# Patient Record
Sex: MALE | Race: WHITE | Employment: FULL TIME | URBAN - METROPOLITAN AREA
[De-identification: names, ages, dates, MRNs, and addresses within clinical notes are randomized per-mention and may not be internally consistent; named-entity substitution may affect disease eponyms.]

---

## 2023-01-27 NOTE — H&P
Patient:   Eduardo Pastrana  YOB: 1965   Date:                        01/27/2023 11:00 AM   Visit Type:                  Consult  Provider:   Leena Thomson MD  Referring Provider:  Susana Edwards MD [facility]  Primary Care Provider: Susana Miranda    This 61year old  patient was referred by Susana Edwards MD.  This 61year old male presents for dilated bile duct and elevated LFTs. History of Present Illness:  1.  dilated bile duct   2.  elevated LFTs   Mr. Alexander De Souza is a 61year old male patient, referred by Dr. Anayeli Messina, who is seen in work in today for abnormal LFTs and a dilated bile duct. I have reviewed the referring provider's note. Patient is unaccompanied for today' visit. He experienced indigestion and worsened acid reflux, which has since improved on Prilosec. He also experiences discomfort under the right ribcage. He also reports dark urine. He wishes to proceed with an ERCP. His last colonoscopy was about 9 years ago. He denies jaundice as well as pulmonary and cardiac concerns. Past Medical/Surgical History:   (Detailed)  Disease/disorder Onset Date Management Date Comments     sinus surgery  MAB 01/27/2023 -   Sleep apnea         Family History:  (Detailed)  Patient reports there is no relevant family history. Social History:  (Detailed)  Tobacco use reviewed. Preferred language is [place]. Smoking status: Current every day smoker. SMOKING STATUS  Type Smoking Status Usage Per Day Years Used Total Pack Years    Current every day smoker        ALCOHOL  There is a history of alcohol use. CAFFEINE  The patient uses caffeine.   COMMENTS  Patient denies iv drug use, blood transfusions, tattoos, piercings, hep a/b vaccination, and flu vaccination    Current Medications:  Medication Generic Name Directions   ondansetron 4 mg disintegrating tablet ondansetron take 1 Tablet by oral route  every 6 hours and place on top of the tongue where it will dissolve, then swallow   PATIENT UNSURE OF DRUG NAME PATIENT UNSURE OF DRUG NAME acid reflux medicines, 1 twice a day and 1 three times a day     Allergies:  Ingredient Reaction (Severity) Medication Name Comment   NO KNOWN ALLERGIES      Reviewed, no changes. Review of Systems:  System Neg/Pos Details   Constitutional Positive Weight loss. ENMT Negative Hearing deficit. Eyes Negative Vision changes. Respiratory Positive Cough. Respiratory Negative Dyspnea. Cardio Negative Chest pain and Irregular heartbeat/palpitations. GI Positive Heartburn, Nausea, Reflux.  Negative Dysuria and Urinary frequency. Endocrine Negative Cold intolerance and Heat intolerance. Neuro Negative Confusion/disorientation, Dizziness, Headache and Seizures. Integumentary Positive Itching skin. Hema/Lymph Negative Easy bleeding. Vital Signs:  BP mm/Hg Pulse Resp Pulse Ox Temp F Ht (Total in.) Weight (lbs.) Weight (oz.) BMI   99/66 75 16   72.00 191.00  25.90     Physical Exam:  Weight has remained stable. Overweight   Patient is healthy-appearing, in no acute distress. Lungs- clear to auscultation and percussion. Heart- regular rate and rhythm without murmur, gallop or rub. Further exam deferred at this time. Assessment/Plan:  # Detail Type Description    1. Assessment Abnormal LFTs (R79.89). 2. Assessment Abnormal ultrasound (R93.89). Plan Orders Further evaluations ordered today include ERCP to be performed, Next Lab Date is on 02/01/2023.         3. Assessment Colon cancer screening (Z12.11). Provider Plan Patient may have CBD stones. Possible pancreatic CA ampullary lesion. Not likely SOD. Reviewed options of EUS vs MRI vs CT, but will proceed with ERCP for diagnosis and therapeutics. Not overtly jaundice and bilirubin is not very high so hopefully so it is not malignancy.             Counseling / Educational Factors:  Items reviewed / discussed during today's visit: testing was discussed in detail; old records were reviewed; indications and risks of the procedure were discussed; symptomatic care was discussed; advised to continue current treatment; patient instructed to call for results of ERCP. Patient expressed understanding of instructions. The patient was checked out at 11:51 AM by Renu Rasmussen. I personally performed the services described in this documentation. Lil[facility] Coyne ([facility] assisted in my presence with documentation, which I have reviewed and validated. Provider:    Trisha Pike  01/27/2023 12:01 PM   Document generated by: Dawn Stephens MD 01/27/2023    CC Providers:  Cheryl RESENDEZ [facility] (DP/SNF)  [address]  [facility] [place] [facility] [place],  [address] [facility]-    ___________________________________________________________________________________________________________________________    Electronically signed by Dawn Stephens MD on 01/27/2023 12:01 PM

## 2023-01-30 ENCOUNTER — PREP FOR PROCEDURE (OUTPATIENT)
Dept: ADMINISTRATIVE | Age: 58
End: 2023-01-30

## 2023-01-30 RX ORDER — ONDANSETRON 4 MG/1
4 TABLET, ORALLY DISINTEGRATING ORAL 3 TIMES DAILY PRN
COMMUNITY
Start: 2023-01-26

## 2023-01-30 RX ORDER — PANTOPRAZOLE SODIUM 20 MG/1
20 TABLET, DELAYED RELEASE ORAL 2 TIMES DAILY
COMMUNITY
Start: 2023-01-19

## 2023-01-30 RX ORDER — SUCRALFATE 1 G/1
1 TABLET ORAL 3 TIMES DAILY
COMMUNITY
Start: 2023-01-19

## 2023-01-30 RX ORDER — HYDROCORTISONE ACETATE 25 MG/1
25 SUPPOSITORY RECTAL 2 TIMES DAILY PRN
COMMUNITY
Start: 2022-10-14

## 2023-01-30 NOTE — PERIOP NOTE
Patient verified name, , and procedure. Type: 1a; abbreviated assessment per anesthesia guidelines  Labs per surgeon: none ordered  Labs per anesthesia: not indicated      Instructed pt that they will be notified by the Gi Lab for time of arrival. If any questions please call the GI lab at [phone]. Follow diet and prep instructions per office. May have clear liquids until 4 hours prior to time of arrival.    [place] Roxo or shower the night before and the am of surgery with antibacterial soap. No lotions, oils, powders, cologne on skin. No make up, eye make up or jewelry. Wear loose fitting comfortable, clean clothing. Must have adult present in building the entire time . Medications for the day of procedure zofran if needed, pantoprazole, carafate. The following discharge instructions reviewed with patient: medication given during procedure may cause drowsiness for several hours, therefore, do not drive or operate machinery for remainder of the day, no alcohol on the day of your procedure, resume regular diet and activity unless otherwise directed, for mild sore throat you may use Cepacol throat lozenges or warm salt water gargles as needed, call your physician for any problems or questions. Patient verbalizes understanding.

## 2023-01-31 ENCOUNTER — ANESTHESIA EVENT (OUTPATIENT)
Dept: ENDOSCOPY | Age: 58
End: 2023-01-31
Payer: COMMERCIAL

## 2023-01-31 RX ORDER — SODIUM CHLORIDE 0.9 % (FLUSH) 0.9 %
5-40 SYRINGE (ML) INJECTION EVERY 12 HOURS SCHEDULED
Status: CANCELLED | OUTPATIENT
Start: 2023-01-31

## 2023-01-31 RX ORDER — SODIUM CHLORIDE 0.9 % (FLUSH) 0.9 %
5-40 SYRINGE (ML) INJECTION PRN
Status: CANCELLED | OUTPATIENT
Start: 2023-01-31

## 2023-01-31 RX ORDER — SODIUM CHLORIDE 9 MG/ML
INJECTION, SOLUTION INTRAVENOUS PRN
Status: CANCELLED | OUTPATIENT
Start: 2023-01-31

## 2023-01-31 NOTE — PROGRESS NOTES
Spoke with patient. He was informed to arrive at 1225 for his 1355 procedure. Patient advised of our  policy and to register prior to coming to the floor. Patient verbalized understanding.

## 2023-02-01 ENCOUNTER — HOSPITAL ENCOUNTER (OUTPATIENT)
Age: 58
Setting detail: OUTPATIENT SURGERY
Discharge: HOME | End: 2023-02-01
Attending: INTERNAL MEDICINE | Admitting: INTERNAL MEDICINE
Payer: COMMERCIAL

## 2023-02-01 ENCOUNTER — APPOINTMENT (OUTPATIENT)
Dept: GENERAL RADIOLOGY | Age: 58
End: 2023-02-01
Attending: INTERNAL MEDICINE
Payer: COMMERCIAL

## 2023-02-01 ENCOUNTER — ANESTHESIA (OUTPATIENT)
Dept: ENDOSCOPY | Age: 58
End: 2023-02-01
Payer: COMMERCIAL

## 2023-02-01 VITALS
HEART RATE: 79 BPM | TEMPERATURE: 97.7 F | OXYGEN SATURATION: 98 % | DIASTOLIC BLOOD PRESSURE: 76 MMHG | SYSTOLIC BLOOD PRESSURE: 120 MMHG | RESPIRATION RATE: 16 BRPM | HEIGHT: 72 IN | BODY MASS INDEX: 25.47 KG/M2 | WEIGHT: 188 LBS

## 2023-02-01 PROCEDURE — 74330 X-RAY BILE/PANC ENDOSCOPY: CPT

## 2023-02-01 PROCEDURE — 3700000001 HC ADD 15 MINUTES (ANESTHESIA): Performed by: INTERNAL MEDICINE

## 2023-02-01 PROCEDURE — 2500000003 HC RX 250 WO HCPCS: Performed by: NURSE ANESTHETIST, CERTIFIED REGISTERED

## 2023-02-01 PROCEDURE — 7100000010 HC PHASE II RECOVERY - FIRST 15 MIN: Performed by: INTERNAL MEDICINE

## 2023-02-01 PROCEDURE — 3609014900 HC ERCP W/SPHINCTEROTOMY &/OR PAPILLOTOMY: Performed by: INTERNAL MEDICINE

## 2023-02-01 PROCEDURE — 6360000002 HC RX W HCPCS: Performed by: NURSE ANESTHETIST, CERTIFIED REGISTERED

## 2023-02-01 PROCEDURE — 7100000000 HC PACU RECOVERY - FIRST 15 MIN: Performed by: INTERNAL MEDICINE

## 2023-02-01 PROCEDURE — 6360000004 HC RX CONTRAST MEDICATION: Performed by: INTERNAL MEDICINE

## 2023-02-01 PROCEDURE — 7100000001 HC PACU RECOVERY - ADDTL 15 MIN: Performed by: INTERNAL MEDICINE

## 2023-02-01 PROCEDURE — 2720000010 HC SURG SUPPLY STERILE: Performed by: INTERNAL MEDICINE

## 2023-02-01 PROCEDURE — C1769 GUIDE WIRE: HCPCS | Performed by: INTERNAL MEDICINE

## 2023-02-01 PROCEDURE — 2709999900 HC NON-CHARGEABLE SUPPLY: Performed by: INTERNAL MEDICINE

## 2023-02-01 PROCEDURE — 2580000003 HC RX 258: Performed by: ANESTHESIOLOGY

## 2023-02-01 PROCEDURE — 3700000000 HC ANESTHESIA ATTENDED CARE: Performed by: INTERNAL MEDICINE

## 2023-02-01 PROCEDURE — C2625 STENT, NON-COR, TEM W/DEL SY: HCPCS | Performed by: INTERNAL MEDICINE

## 2023-02-01 DEVICE — BILIARY STENT WITH NAVIFLEXTM RX DELIVERY SYSTEM
Type: IMPLANTABLE DEVICE | Site: BILE DUCT | Status: FUNCTIONAL
Brand: ADVANIX™ BILIARY

## 2023-02-01 RX ORDER — ONDANSETRON 2 MG/ML
INJECTION INTRAMUSCULAR; INTRAVENOUS PRN
Status: DISCONTINUED | OUTPATIENT
Start: 2023-02-01 | End: 2023-02-01 | Stop reason: SDUPTHER

## 2023-02-01 RX ORDER — ONDANSETRON 2 MG/ML
4 INJECTION INTRAMUSCULAR; INTRAVENOUS
Status: DISCONTINUED | OUTPATIENT
Start: 2023-02-01 | End: 2023-02-01 | Stop reason: HOSPADM

## 2023-02-01 RX ORDER — LIDOCAINE HYDROCHLORIDE 20 MG/ML
INJECTION, SOLUTION EPIDURAL; INFILTRATION; INTRACAUDAL; PERINEURAL PRN
Status: DISCONTINUED | OUTPATIENT
Start: 2023-02-01 | End: 2023-02-01 | Stop reason: SDUPTHER

## 2023-02-01 RX ORDER — SODIUM CHLORIDE, SODIUM LACTATE, POTASSIUM CHLORIDE, CALCIUM CHLORIDE 600; 310; 30; 20 MG/100ML; MG/100ML; MG/100ML; MG/100ML
INJECTION, SOLUTION INTRAVENOUS CONTINUOUS
Status: DISCONTINUED | OUTPATIENT
Start: 2023-02-01 | End: 2023-02-01 | Stop reason: HOSPADM

## 2023-02-01 RX ORDER — SODIUM CHLORIDE 9 MG/ML
INJECTION, SOLUTION INTRAVENOUS PRN
Status: DISCONTINUED | OUTPATIENT
Start: 2023-02-01 | End: 2023-02-01 | Stop reason: HOSPADM

## 2023-02-01 RX ORDER — SODIUM CHLORIDE 0.9 % (FLUSH) 0.9 %
5-40 SYRINGE (ML) INJECTION PRN
Status: DISCONTINUED | OUTPATIENT
Start: 2023-02-01 | End: 2023-02-01 | Stop reason: HOSPADM

## 2023-02-01 RX ORDER — SODIUM CHLORIDE 0.9 % (FLUSH) 0.9 %
5-40 SYRINGE (ML) INJECTION EVERY 12 HOURS SCHEDULED
Status: DISCONTINUED | OUTPATIENT
Start: 2023-02-01 | End: 2023-02-01 | Stop reason: HOSPADM

## 2023-02-01 RX ORDER — LIDOCAINE HYDROCHLORIDE 10 MG/ML
1 INJECTION, SOLUTION INFILTRATION; PERINEURAL
Status: DISCONTINUED | OUTPATIENT
Start: 2023-02-01 | End: 2023-02-01 | Stop reason: HOSPADM

## 2023-02-01 RX ORDER — ROCURONIUM BROMIDE 10 MG/ML
INJECTION, SOLUTION INTRAVENOUS PRN
Status: DISCONTINUED | OUTPATIENT
Start: 2023-02-01 | End: 2023-02-01 | Stop reason: SDUPTHER

## 2023-02-01 RX ORDER — SUCCINYLCHOLINE CHLORIDE 20 MG/ML
INJECTION INTRAMUSCULAR; INTRAVENOUS PRN
Status: DISCONTINUED | OUTPATIENT
Start: 2023-02-01 | End: 2023-02-01 | Stop reason: SDUPTHER

## 2023-02-01 RX ORDER — PROPOFOL 10 MG/ML
INJECTION, EMULSION INTRAVENOUS PRN
Status: DISCONTINUED | OUTPATIENT
Start: 2023-02-01 | End: 2023-02-01 | Stop reason: SDUPTHER

## 2023-02-01 RX ORDER — DEXAMETHASONE SODIUM PHOSPHATE 4 MG/ML
INJECTION, SOLUTION INTRA-ARTICULAR; INTRALESIONAL; INTRAMUSCULAR; INTRAVENOUS; SOFT TISSUE PRN
Status: DISCONTINUED | OUTPATIENT
Start: 2023-02-01 | End: 2023-02-01 | Stop reason: SDUPTHER

## 2023-02-01 RX ORDER — DEXTROSE MONOHYDRATE 100 MG/ML
INJECTION, SOLUTION INTRAVENOUS CONTINUOUS PRN
Status: DISCONTINUED | OUTPATIENT
Start: 2023-02-01 | End: 2023-02-01 | Stop reason: HOSPADM

## 2023-02-01 RX ORDER — EPHEDRINE SULFATE/0.9% NACL/PF 50 MG/5 ML
SYRINGE (ML) INTRAVENOUS PRN
Status: DISCONTINUED | OUTPATIENT
Start: 2023-02-01 | End: 2023-02-01 | Stop reason: SDUPTHER

## 2023-02-01 RX ADMIN — LIDOCAINE HYDROCHLORIDE 100 MG: 20 INJECTION, SOLUTION EPIDURAL; INFILTRATION; INTRACAUDAL; PERINEURAL at 13:24

## 2023-02-01 RX ADMIN — DEXAMETHASONE SODIUM PHOSPHATE 10 MG: 4 INJECTION, SOLUTION INTRAMUSCULAR; INTRAVENOUS at 13:36

## 2023-02-01 RX ADMIN — SODIUM CHLORIDE, POTASSIUM CHLORIDE, SODIUM LACTATE AND CALCIUM CHLORIDE: 600; 310; 30; 20 INJECTION, SOLUTION INTRAVENOUS at 12:53

## 2023-02-01 RX ADMIN — Medication 10 MG: at 13:38

## 2023-02-01 RX ADMIN — PROPOFOL 200 MG: 10 INJECTION, EMULSION INTRAVENOUS at 13:24

## 2023-02-01 RX ADMIN — ROCURONIUM BROMIDE 5 MG: 50 INJECTION, SOLUTION INTRAVENOUS at 13:24

## 2023-02-01 RX ADMIN — Medication 160 MG: at 13:24

## 2023-02-01 RX ADMIN — Medication 10 MG: at 13:52

## 2023-02-01 RX ADMIN — ONDANSETRON 4 MG: 2 INJECTION INTRAMUSCULAR; INTRAVENOUS at 13:36

## 2023-02-01 ASSESSMENT — PAIN - FUNCTIONAL ASSESSMENT: PAIN_FUNCTIONAL_ASSESSMENT: NONE - DENIES PAIN

## 2023-02-01 ASSESSMENT — LIFESTYLE VARIABLES: SMOKING_STATUS: 1

## 2023-02-01 NOTE — OP NOTE
ENDOSCOPIC  RETROGRADE CHOLANGIOPANCREATOGRAPHY    DATE of PROCEDURE: 2/1/2023    PT NAME: Onur Laws.     [id number]    PRE OP obstructive jaundice    MEDICATION: general;     INSTRUMENT:  [id number]    SPECIAL PROCEDURE:papillotomy w/ balloon sweep- 9/12 mm; 10 fr 7 cm cbd stent   BLOOD LOSS- 0 to min. SPEC- no  IMPLANT- none    PROCEDURE: After informed consent, the patient was placed under anesthesia in the semi-prone position. The duodenoscope was passed without difficulty to the area of the ampulla. A standard cannulation and ERCP was performed with the findings as outlined and described below. Patient tolerated the procedure well. 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 Could visualize this area post papillotomy. Sludge and cloudy bile extracted. Able to pass 12 mm balloon with resistance. Good drainage with stent. Pancreas- not able to cannulate. GB- ?  Sludge vs small stones  Biggest concern would a small pancreatic head tumor    PLAN:  EUS    Ryan Hoang MD

## 2023-02-01 NOTE — ANESTHESIA POSTPROCEDURE EVALUATION
Department of Anesthesiology  Postprocedure Note    Patient: Tasha Moore. MRN: [id number]  YOB: 1965  Date of evaluation: 2/1/2023      Procedure Summary     Date: 02/01/23 Room / Location: [facility] ENDO FLOURO 1 / [facility] ENDOSCOPY    Anesthesia Start: 1316 Anesthesia Stop: 5473    Procedure: ERCP SPHINCTER/PAPILLOTOMY (Upper GI Region) Diagnosis:       Abnormal ultrasound      (Abnormal ultrasound [R93.89])    Surgeons: Abby Barragan MD Responsible Provider: Carolyne Boyle MD    Anesthesia Type: General ASA Status: 2          Anesthesia Type: General    Trino Phase I: Trino Score: 8    Trino Phase II: Trino Score: 10      Anesthesia Post Evaluation    Patient location during evaluation: PACU  Patient participation: complete - patient participated  Level of consciousness: awake and alert  Airway patency: patent  Nausea: well controlled. Complications: no  Cardiovascular status: acceptable.   Respiratory status: acceptable  Hydration status: stable

## 2023-02-01 NOTE — DISCHARGE INSTRUCTIONS
Endoscopic Retrograde Cholangiopancreatogram (ERCP): What to Expect at [address] [facility]  After you have an endoscopic retrograde cholangiopancreatogram (ERCP). You will be able to go home after your doctor or a nurse checks to make sure you are not having any problems. If you stay in the hospital overnight, you may go home the next day. You may have a sore throat for a day or two after the procedure. This care sheet gives you a general idea about how long it will take for you to recover. But each person recovers at a different pace. Follow the steps below to get better as quickly as possible. How can you care for yourself at home? Activity  Rest as much as you need to after you go home. You should be able to go back to your usual activities the day after the procedure. Diet  Follow your doctor's directions for eating after the procedure. Drink plenty of fluids (unless your doctor tells you not to). Medicines  If you have a sore throat the next day, use an over-the-counter spray to numb your throat. Medication Interaction:  During your procedure you potentially received a medication or medications which may reduce the effectiveness of oral contraceptives. Please consider other forms of contraception for 1 month following your procedure if you are currently using oral contraceptives as your primary form of birth control. In addition to this, we recommend continuing your oral contraceptive as prescribed, unless otherwise instructed by your physician, during this time. Follow-up care is a key part of your treatment and safety. Be sure to make and go to all appointments, and call your doctor if you are having problems. Its also a good idea to know your test results and keep a list of the medicines you take. When should you call for help? Call 911 anytime you think you may need emergency care. For example, call if:  You vomit blood or what looks like coffee grounds.   You pass maroon or bloody stools. Call your doctor now or go to the emergency room if:  You have trouble swallowing. You have belly pain. Your stools are black and tarlike or have streaks of blood. You are sick to your stomach and cannot drink fluids. You have a fever. You have pain that does not get better after you take your pain medicine. Watch closely for changes in your health, and be sure to contact your doctor if:  Your throat still hurts after a day or two. You do not get better as expected. After general anesthesia or intravenous sedation, for 24 hours or while taking prescription Narcotics:  Limit your activities  A responsible adult needs to be with you for the next 24 hours  Do not drive and operate hazardous machinery  Do not make important personal or business decisions  Do not drink alcoholic beverages  If you have not urinated within 8 hours after discharge, and you are experiencing discomfort from urinary retention, please go to the nearest ED. If you have sleep apnea and have a CPAP machine, please use it for all naps and sleeping. Please use caution when taking narcotics and any of your home medications that may cause drowsiness. *  Please give a list of your current medications to your Primary Care Provider. *  Please update this list whenever your medications are discontinued, doses are      changed, or new medications (including over-the-counter products) are added. *  Please carry medication information at all times in case of emergency situations. These are general instructions for a healthy lifestyle:  No smoking/ No tobacco products/ Avoid exposure to second hand smoke  Surgeon General's Warning:  Quitting smoking now greatly reduces serious risk to your health.   Obesity, smoking, and sedentary lifestyle greatly increases your risk for illness  A healthy diet, regular physical exercise & weight monitoring are important for maintaining a healthy lifestyle    You may be retaining fluid if you have a history of heart failure or if you experience any of the following symptoms:  Weight gain of 3 pounds or more overnight or 5 pounds in a week, increased swelling in our hands or feet or shortness of breath while lying flat in bed. Please call your doctor as soon as you notice any of these symptoms; do not wait until your next office visit.

## 2023-02-01 NOTE — INTERVAL H&P NOTE
Update History & Physical    The patient's History and Physical of January 27, 2023 was reviewed with the patient and I examined the patient. There was no change. The surgical site was confirmed by the patient and me. Plan: The risks, benefits, expected outcome, and alternative to the recommended procedure have been discussed with the patient. Patient understands and wants to proceed with the procedure.      Electronically signed by Stephanie Owens MD on 2/1/2023 at 12:55 PM

## 2023-02-01 NOTE — PERIOP NOTE
Pt and daughter given discharge instructions at bedside, both verbalize understanding. All questions answered.

## 2023-02-01 NOTE — ANESTHESIA PRE PROCEDURE
Department of Anesthesiology  Preprocedure Note       Name:  Madeline Glynn. Age:  61 y.o.  :  1965                                          MRN:  [id number]         Date:  2023      Surgeon: Adolfo Flores):  Kisha Nguyen MD    Procedure: Procedure(s):  ERCP ENDOSCOPIC RETROGRADE CHOLANGIOPANCREATOGRAPHY/ 26    Medications prior to admission:   Prior to Admission medications    Medication Sig Start Date End Date Taking? Authorizing Provider   pantoprazole (PROTONIX) 20 MG tablet Take 20 mg by mouth 2 times daily 23  Yes Historical Provider, MD   sucralfate (CARAFATE) 1 GM tablet Take 1 g by mouth 3 times daily 23  Yes Historical Provider, MD   ondansetron (ZOFRAN-ODT) 4 MG disintegrating tablet Take 4 mg by mouth 3 times daily as needed 23  Yes Historical Provider, MD   hydrocortisone (ANUSOL-HC) 25 MG suppository Place 25 mg rectally 2 times daily as needed 10/14/22  Yes Historical Provider, MD       Current medications:    No current facility-administered medications for this encounter. Allergies:  No Known Allergies    Problem List:  There is no problem list on file for this patient.       Past Medical History:        Diagnosis Date    GERD (gastroesophageal reflux disease)     medication    Sleep apnea     has a CPAP but does not use       Past Surgical History:        Procedure Laterality Date    COLONOSCOPY      SINUS SURGERY      x3    VASECTOMY         Social History:    Social History     Tobacco Use    Smoking status: Every Day     Packs/day: 0.50     Years: 40.00     Pack years: 20.00     Types: Cigarettes    Smokeless tobacco: Never   Substance Use Topics    Alcohol use: Not Currently                                Ready to quit: Not Answered  Counseling given: Not Answered      Vital Signs (Current):   Vitals:    23 1435 23 1237   BP:  117/75   Pulse:  76   Resp:  16   Temp:  97.6 °F (36.4 °C)   TempSrc:  Oral   SpO2:  97%   Weight: 190 lb (86.2 kg) 188 lb (85.3 kg)   Height: 6' (1.829 m) 6' (1.829 m)                                              BP Readings from Last 3 Encounters:   02/01/23 117/75       NPO Status:                                                                                 BMI:   Wt Readings from Last 3 Encounters:   02/01/23 188 lb (85.3 kg)     Body mass index is 25.5 kg/m². CBC: No results found for: WBC, RBC, HGB, HCT, MCV, RDW, PLT    CMP: No results found for: NA, K, CL, CO2, BUN, CREATININE, GFRAA, AGRATIO, LABGLOM, GLUCOSE, GLU, PROT, CALCIUM, BILITOT, ALKPHOS, AST, ALT    POC Tests: No results for input(s): POCGLU, POCNA, POCK, POCCL, POCBUN, POCHEMO, POCHCT in the last 72 hours. Coags: No results found for: PROTIME, INR, APTT    HCG (If Applicable): No results found for: PREGTESTUR, PREGSERUM, HCG, HCGQUANT     ABGs: No results found for: PHART, PO2ART, VFU0PBB, IZE3KON, BEART, M6TIAPEG     Type & Screen (If Applicable):  No results found for: LABABO, LABRH    Drug/Infectious Status (If Applicable):  No results found for: HIV, HEPCAB    COVID-19 Screening (If Applicable): No results found for: COVID19        Anesthesia Evaluation  Patient summary reviewed and Nursing notes reviewed no history of anesthetic complications:   Airway: Mallampati: II  TM distance: >3 FB   Neck ROM: full  Mouth opening: > = 3 FB   Dental: normal exam         Pulmonary:normal exam    (+) sleep apnea: on noncompliant,  current smoker (~1 ppd)                           Cardiovascular:                      Neuro/Psych:               GI/Hepatic/Renal:   (+) GERD:,          ROS comment: Transaminitis. Endo/Other:                     Abdominal:             Vascular: Other Findings:           Anesthesia Plan      general     ASA 2       Induction: intravenous. MIPS: Postoperative opioids intended. Anesthetic plan and risks discussed with patient.                         Mario Ospina MD   2/1/2023

## 2023-02-01 NOTE — ANESTHESIA PROCEDURE NOTES
Airway  Date/Time: 2/1/2023 1:26 PM  Urgency: elective    Airway not difficult    General Information and Staff    Patient location during procedure: OR  Resident/CRNA: IFRAH Rosa - CRNA  Performed: resident/CRNA     Indications and Patient Condition  Indications for airway management: anesthesia  Spontaneous Ventilation: absent  Sedation level: deep  Preoxygenated: yes  Patient position: sniffing  MILS not maintained throughout  Mask difficulty assessment: not attempted    Final Airway Details  Final airway type: endotracheal airway      Successful airway: ETT  Cuffed: yes   Successful intubation technique: direct laryngoscopy  Facilitating devices/methods: intubating stylet  Endotracheal tube insertion site: oral  Blade: Negrito  Blade size: #4  ETT size (mm): 8.0  Cormack-Lehane Classification: grade IIb - view of arytenoids or posterior of glottis only  Placement verified by: chest auscultation and capnometry   Measured from: lips  Number of attempts at approach: 1  Ventilation between attempts: bag mask  Number of other approaches attempted: 0    no

## 2023-02-18 ENCOUNTER — APPOINTMENT (OUTPATIENT)
Dept: CT IMAGING | Age: 58
DRG: 862 | End: 2023-02-18
Payer: COMMERCIAL

## 2023-02-18 ENCOUNTER — HOSPITAL ENCOUNTER (INPATIENT)
Age: 58
LOS: 10 days | Discharge: HOME | DRG: 862 | End: 2023-02-28
Attending: EMERGENCY MEDICINE | Admitting: INTERNAL MEDICINE
Payer: COMMERCIAL

## 2023-02-18 DIAGNOSIS — K81.0 ACUTE CHOLECYSTITIS: ICD-10-CM

## 2023-02-18 DIAGNOSIS — K81.9 CHOLECYSTITIS: ICD-10-CM

## 2023-02-18 DIAGNOSIS — K85.10 ACUTE BILIARY PANCREATITIS, UNSPECIFIED COMPLICATION STATUS: ICD-10-CM

## 2023-02-18 DIAGNOSIS — R10.13 ABDOMINAL PAIN, EPIGASTRIC: Primary | ICD-10-CM

## 2023-02-18 PROBLEM — K85.90 PANCREATITIS: Status: ACTIVE | Noted: 2023-02-18

## 2023-02-18 PROBLEM — E87.1 HYPONATREMIA: Status: ACTIVE | Noted: 2023-02-18

## 2023-02-18 PROBLEM — R10.9 ABDOMINAL PAIN: Status: ACTIVE | Noted: 2023-02-18

## 2023-02-18 PROBLEM — Z72.0 TOBACCO USE: Status: ACTIVE | Noted: 2023-02-18

## 2023-02-18 PROBLEM — A41.9 SEPSIS (HCC): Status: ACTIVE | Noted: 2023-02-18

## 2023-02-18 PROBLEM — K83.1 BILIARY STRICTURE: Chronic | Status: ACTIVE | Noted: 2023-02-18

## 2023-02-18 LAB
ALBUMIN SERPL-MCNC: 3.4 G/DL (ref 3.5–5)
ALBUMIN/GLOB SERPL: 0.7 (ref 0.4–1.6)
ALP SERPL-CCNC: 119 U/L (ref 50–136)
ALT SERPL-CCNC: 42 U/L (ref 12–65)
ANION GAP SERPL CALC-SCNC: 5 MMOL/L (ref 2–11)
AST SERPL-CCNC: 12 U/L (ref 15–37)
BASOPHILS # BLD: 0.1 K/UL (ref 0–0.2)
BASOPHILS NFR BLD: 0 % (ref 0–2)
BILIRUB SERPL-MCNC: 1.4 MG/DL (ref 0.2–1.1)
BUN SERPL-MCNC: 10 MG/DL (ref 6–23)
CALCIUM SERPL-MCNC: 9.2 MG/DL (ref 8.3–10.4)
CHLORIDE SERPL-SCNC: 102 MMOL/L (ref 101–110)
CO2 SERPL-SCNC: 23 MMOL/L (ref 21–32)
CREAT SERPL-MCNC: 1 MG/DL (ref 0.8–1.5)
DIFFERENTIAL METHOD BLD: ABNORMAL
EOSINOPHIL # BLD: 0.1 K/UL (ref 0–0.8)
EOSINOPHIL NFR BLD: 0 % (ref 0.5–7.8)
ERYTHROCYTE [DISTWIDTH] IN BLOOD BY AUTOMATED COUNT: 12.3 % (ref 11.9–14.6)
GLOBULIN SER CALC-MCNC: 4.9 G/DL (ref 2.8–4.5)
GLUCOSE SERPL-MCNC: 117 MG/DL (ref 65–100)
HCT VFR BLD AUTO: 48.5 % (ref 41.1–50.3)
HGB BLD-MCNC: 16.7 G/DL (ref 13.6–17.2)
IMM GRANULOCYTES # BLD AUTO: 0.1 K/UL (ref 0–0.5)
IMM GRANULOCYTES NFR BLD AUTO: 1 % (ref 0–5)
LACTATE SERPL-SCNC: 1.8 MMOL/L (ref 0.4–2)
LIPASE SERPL-CCNC: 1088 U/L (ref 73–393)
LYMPHOCYTES # BLD: 1.4 K/UL (ref 0.5–4.6)
LYMPHOCYTES NFR BLD: 9 % (ref 13–44)
MAGNESIUM SERPL-MCNC: 1.8 MG/DL (ref 1.8–2.4)
MCH RBC QN AUTO: 31.2 PG (ref 26.1–32.9)
MCHC RBC AUTO-ENTMCNC: 34.4 G/DL (ref 31.4–35)
MCV RBC AUTO: 90.5 FL (ref 82–102)
MONOCYTES # BLD: 1.2 K/UL (ref 0.1–1.3)
MONOCYTES NFR BLD: 7 % (ref 4–12)
NEUTS SEG # BLD: 13 K/UL (ref 1.7–8.2)
NEUTS SEG NFR BLD: 83 % (ref 43–78)
NRBC # BLD: 0 K/UL (ref 0–0.2)
PLATELET # BLD AUTO: 306 K/UL (ref 150–450)
PMV BLD AUTO: 10 FL (ref 9.4–12.3)
POTASSIUM SERPL-SCNC: 3.7 MMOL/L (ref 3.5–5.1)
PROCALCITONIN SERPL-MCNC: 0.31 NG/ML (ref 0–0.49)
PROT SERPL-MCNC: 8.3 G/DL (ref 6.3–8.2)
RBC # BLD AUTO: 5.36 M/UL (ref 4.23–5.6)
SODIUM SERPL-SCNC: 130 MMOL/L (ref 133–143)
WBC # BLD AUTO: 15.8 K/UL (ref 4.3–11.1)

## 2023-02-18 PROCEDURE — 80053 COMPREHEN METABOLIC PANEL: CPT

## 2023-02-18 PROCEDURE — 87040 BLOOD CULTURE FOR BACTERIA: CPT

## 2023-02-18 PROCEDURE — 74177 CT ABD & PELVIS W/CONTRAST: CPT

## 2023-02-18 PROCEDURE — 83690 ASSAY OF LIPASE: CPT

## 2023-02-18 PROCEDURE — 1100000003 HC PRIVATE W/ TELEMETRY

## 2023-02-18 PROCEDURE — 96365 THER/PROPH/DIAG IV INF INIT: CPT | Performed by: EMERGENCY MEDICINE

## 2023-02-18 PROCEDURE — 96361 HYDRATE IV INFUSION ADD-ON: CPT | Performed by: EMERGENCY MEDICINE

## 2023-02-18 PROCEDURE — 6360000002 HC RX W HCPCS: Performed by: EMERGENCY MEDICINE

## 2023-02-18 PROCEDURE — 84145 PROCALCITONIN (PCT): CPT

## 2023-02-18 PROCEDURE — 83735 ASSAY OF MAGNESIUM: CPT

## 2023-02-18 PROCEDURE — 99285 EMERGENCY DEPT VISIT HI MDM: CPT | Performed by: EMERGENCY MEDICINE

## 2023-02-18 PROCEDURE — 85025 COMPLETE CBC W/AUTO DIFF WBC: CPT

## 2023-02-18 PROCEDURE — 83605 ASSAY OF LACTIC ACID: CPT

## 2023-02-18 PROCEDURE — 6370000000 HC RX 637 (ALT 250 FOR IP): Performed by: EMERGENCY MEDICINE

## 2023-02-18 PROCEDURE — 96376 TX/PRO/DX INJ SAME DRUG ADON: CPT | Performed by: EMERGENCY MEDICINE

## 2023-02-18 PROCEDURE — 6360000004 HC RX CONTRAST MEDICATION: Performed by: EMERGENCY MEDICINE

## 2023-02-18 PROCEDURE — 2580000003 HC RX 258: Performed by: EMERGENCY MEDICINE

## 2023-02-18 PROCEDURE — 96375 TX/PRO/DX INJ NEW DRUG ADDON: CPT | Performed by: EMERGENCY MEDICINE

## 2023-02-18 RX ORDER — MORPHINE SULFATE 2 MG/ML
2 INJECTION, SOLUTION INTRAMUSCULAR; INTRAVENOUS EVERY 4 HOURS PRN
Status: DISCONTINUED | OUTPATIENT
Start: 2023-02-18 | End: 2023-02-19

## 2023-02-18 RX ORDER — SODIUM CHLORIDE 9 MG/ML
INJECTION, SOLUTION INTRAVENOUS PRN
Status: DISCONTINUED | OUTPATIENT
Start: 2023-02-18 | End: 2023-02-27

## 2023-02-18 RX ORDER — POLYETHYLENE GLYCOL 3350 17 G/17G
17 POWDER, FOR SOLUTION ORAL DAILY PRN
Status: DISCONTINUED | OUTPATIENT
Start: 2023-02-18 | End: 2023-02-20

## 2023-02-18 RX ORDER — SODIUM CHLORIDE 9 MG/ML
INJECTION, SOLUTION INTRAVENOUS CONTINUOUS
Status: DISCONTINUED | OUTPATIENT
Start: 2023-02-19 | End: 2023-02-20

## 2023-02-18 RX ORDER — ACETAMINOPHEN 325 MG/1
650 TABLET ORAL EVERY 6 HOURS PRN
Status: DISCONTINUED | OUTPATIENT
Start: 2023-02-18 | End: 2023-02-28 | Stop reason: HOSPADM

## 2023-02-18 RX ORDER — 0.9 % SODIUM CHLORIDE 0.9 %
1000 INTRAVENOUS SOLUTION INTRAVENOUS ONCE
Status: DISCONTINUED | OUTPATIENT
Start: 2023-02-18 | End: 2023-02-20

## 2023-02-18 RX ORDER — ONDANSETRON 2 MG/ML
8 INJECTION INTRAMUSCULAR; INTRAVENOUS
Status: COMPLETED | OUTPATIENT
Start: 2023-02-18 | End: 2023-02-18

## 2023-02-18 RX ORDER — 0.9 % SODIUM CHLORIDE 0.9 %
1000 INTRAVENOUS SOLUTION INTRAVENOUS ONCE
Status: COMPLETED | OUTPATIENT
Start: 2023-02-18 | End: 2023-02-19

## 2023-02-18 RX ORDER — SODIUM CHLORIDE 0.9 % (FLUSH) 0.9 %
5-40 SYRINGE (ML) INJECTION EVERY 12 HOURS SCHEDULED
Status: DISCONTINUED | OUTPATIENT
Start: 2023-02-19 | End: 2023-02-20

## 2023-02-18 RX ORDER — ACETAMINOPHEN 500 MG
500 TABLET ORAL EVERY 6 HOURS PRN
COMMUNITY

## 2023-02-18 RX ORDER — MORPHINE SULFATE 4 MG/ML
4 INJECTION INTRAVENOUS ONCE
Status: COMPLETED | OUTPATIENT
Start: 2023-02-18 | End: 2023-02-18

## 2023-02-18 RX ORDER — 0.9 % SODIUM CHLORIDE 0.9 %
1000 INTRAVENOUS SOLUTION INTRAVENOUS ONCE
Status: COMPLETED | OUTPATIENT
Start: 2023-02-18 | End: 2023-02-18

## 2023-02-18 RX ORDER — OXYCODONE HYDROCHLORIDE 5 MG/1
5 CAPSULE ORAL EVERY 4 HOURS PRN
COMMUNITY

## 2023-02-18 RX ORDER — ONDANSETRON 2 MG/ML
4 INJECTION INTRAMUSCULAR; INTRAVENOUS EVERY 6 HOURS PRN
Status: DISCONTINUED | OUTPATIENT
Start: 2023-02-18 | End: 2023-02-28 | Stop reason: HOSPADM

## 2023-02-18 RX ORDER — ONDANSETRON 4 MG/1
4 TABLET, ORALLY DISINTEGRATING ORAL EVERY 8 HOURS PRN
Status: DISCONTINUED | OUTPATIENT
Start: 2023-02-18 | End: 2023-02-28 | Stop reason: HOSPADM

## 2023-02-18 RX ORDER — SODIUM CHLORIDE 0.9 % (FLUSH) 0.9 %
5-40 SYRINGE (ML) INJECTION PRN
Status: DISCONTINUED | OUTPATIENT
Start: 2023-02-18 | End: 2023-02-27

## 2023-02-18 RX ORDER — ACETAMINOPHEN 650 MG/1
650 SUPPOSITORY RECTAL EVERY 6 HOURS PRN
Status: DISCONTINUED | OUTPATIENT
Start: 2023-02-18 | End: 2023-02-20

## 2023-02-18 RX ADMIN — ONDANSETRON 8 MG: 2 INJECTION INTRAMUSCULAR; INTRAVENOUS at 17:53

## 2023-02-18 RX ADMIN — IOPAMIDOL 100 ML: 755 INJECTION, SOLUTION INTRAVENOUS at 19:51

## 2023-02-18 RX ADMIN — PIPERACILLIN AND TAZOBACTAM 4500 MG: 4; .5 INJECTION, POWDER, LYOPHILIZED, FOR SOLUTION INTRAVENOUS at 22:04

## 2023-02-18 RX ADMIN — SODIUM CHLORIDE 1000 ML: 9 INJECTION, SOLUTION INTRAVENOUS at 17:54

## 2023-02-18 RX ADMIN — SODIUM CHLORIDE 1000 ML: 9 INJECTION, SOLUTION INTRAVENOUS at 22:23

## 2023-02-18 RX ADMIN — MORPHINE SULFATE 4 MG: 4 INJECTION INTRAVENOUS at 22:04

## 2023-02-18 RX ADMIN — MORPHINE SULFATE 4 MG: 4 INJECTION INTRAVENOUS at 17:53

## 2023-02-18 RX ADMIN — HYOSCYAMINE SULFATE 125 MCG: 0.12 TABLET ORAL; SUBLINGUAL at 17:53

## 2023-02-18 ASSESSMENT — PAIN - FUNCTIONAL ASSESSMENT
PAIN_FUNCTIONAL_ASSESSMENT: 0-10
PAIN_FUNCTIONAL_ASSESSMENT: ACTIVITIES ARE NOT PREVENTED

## 2023-02-18 ASSESSMENT — PAIN DESCRIPTION - LOCATION: LOCATION: ABDOMEN

## 2023-02-18 ASSESSMENT — ENCOUNTER SYMPTOMS
ABDOMINAL PAIN: 1
RESPIRATORY NEGATIVE: 1
NAUSEA: 1

## 2023-02-18 ASSESSMENT — PAIN DESCRIPTION - FREQUENCY: FREQUENCY: INTERMITTENT

## 2023-02-18 ASSESSMENT — PAIN DESCRIPTION - ONSET: ONSET: ON-GOING

## 2023-02-18 ASSESSMENT — PAIN SCALES - GENERAL
PAINLEVEL_OUTOF10: 2
PAINLEVEL_OUTOF10: 2
PAINLEVEL_OUTOF10: 10
PAINLEVEL_OUTOF10: 5

## 2023-02-18 ASSESSMENT — PAIN DESCRIPTION - ORIENTATION: ORIENTATION: RIGHT;ANTERIOR;UPPER

## 2023-02-18 ASSESSMENT — LIFESTYLE VARIABLES: HOW OFTEN DO YOU HAVE A DRINK CONTAINING ALCOHOL: PATIENT DECLINED

## 2023-02-18 ASSESSMENT — PAIN DESCRIPTION - DESCRIPTORS: DESCRIPTORS: SHARP

## 2023-02-18 ASSESSMENT — PAIN DESCRIPTION - DIRECTION: RADIATING_TOWARDS: BACK

## 2023-02-18 ASSESSMENT — PAIN DESCRIPTION - PAIN TYPE: TYPE: ACUTE PAIN

## 2023-02-18 NOTE — ED TRIAGE NOTES
Pt arrives to ED ambulatory with daughter at bedside c/o intermittent mid upper abdominal pain s/p bile duct stent placement on Thursday 2/16/23.  Hx chronic pancreatitis

## 2023-02-18 NOTE — ED PROVIDER NOTES
Emergency Department Provider Note                   PCP:                Celine Ortiz MD               Age: 61 y.o. Sex: male       ICD-10-CM    1. Abdominal pain, epigastric  R10.13       2. Cholecystitis  K81.9       3. Acute biliary pancreatitis, unspecified complication status  I97.70           DISPOSITION Admitted 02/18/2023 10:51:32 PM        Medical Decision Making  28-year-old  male presented emergency department with upper abdominal pain for the past 2 days. Patient with 2 recent procedures performed by GI for biliary obstruction. Patient with recent EUS and fine-needle aspiration of pancreas. Patient initially was in significant pain and tachycardic. His white blood cell count was slightly elevated but his procalcitonin and lactic acid were both normal.  Patient underwent CT scan which showed possible choledocho colonic fistula. Discussed with radiologist and patient may have the choledocho colonic fistula by coronal reformatting is less suggestive of this and more suggestive of edema around the gallbladder consistent with cholecystitis. I discussed case with general surgery and with gastroenterology as well as the hospitalist.  After patient's CT scan results the patient was given antibiotics and was cultured to rule out early sepsis and bacteremia. Patient clinically improved with pain medications and fluids. Ultimately, patient will be admitted to hospitalist with general surgery and GI consult. GI has already seen patient and they are agreeable to this plan as well. General surgery will also see in night. Patient will likely need cholecystectomy however this does not need to be performed on an emergent basis. Patient will be admitted for IV antibiotics and fluid resuscitation. Patient will have further evaluation of his gallbladder while admitted and may have HIDA scan performed. Discussed at length with patient and he is agreeable to this plan as well.     Amount and/or Complexity of Data Reviewed  External Data Reviewed: notes. Labs: ordered. Decision-making details documented in ED Course. Radiology: ordered and independent interpretation performed. Decision-making details documented in ED Course. Risk  Prescription drug management. ED Course as of 02/18/23 2322   Sat Feb 18, 2023   1832 Patient still has some pain but states he feels better than he has in the last 2 weeks after medications. Awaiting CT scan. Patient's lipase is elevated. He also has mild elevation of his white blood cell count. Procalcitonin level is pending. Lactic acid is normal. [JL]   2154 Patient's pain has recurred. CT independently reviewed and taken gallbladder is noted. 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 Due to the findings on CT scan and patient's elevated white blood cell count, I will draw blood cultures and give patient antibiotics. Patient's lactic acid and procalcitonin levels are reassuring but patient may be early sepsis therefore blood cultures and antibiotics were given. Patient did not have obvious sepsis until findings found on CT. [JL]   2207 Discussed with GI. They will see patient in consult tonight. Agreed with surgical consult. If possible, patient to be admitted to hospitalist and have surgery and GI as consultants. [JL]   2233 Discussed with Dr. Carter Ureña with general surgery. Agrees with plan of IV antibiotics, fluids, pain control. He will see patient in consult as well. Patient does not need emergent surgery tonight as his vital signs are reassuring and patient's lactic acid and procalcitonin are also reassuring.  [JL]      ED Course User Index  [JL] Fidelina Alberto MD        Orders Placed This Encounter   Procedures    Critical Care    Blood Culture 1    Blood Culture 2    CT ABDOMEN PELVIS W IV CONTRAST Additional Contrast? None    CBC with Auto Differential    CMP    Lactate, Sepsis    Lipase    Magnesium    Procalcitonin    Telemetry monitoring - 72 hour duration    EKG 12 Lead    Saline lock IV    ADMIT TO INPATIENT        Medications   0.9 % sodium chloride bolus (1,000 mLs IntraVENous New Bag 2/18/23 2223)   0.9 % sodium chloride bolus (has no administration in time range)   0.9 % sodium chloride bolus (0 mLs IntraVENous Stopped 2/18/23 2023)   hyoscyamine (LEVSIN/SL) sublingual tablet 125 mcg (125 mcg SubLINGual Given 2/18/23 1753)   ondansetron (ZOFRAN) injection 8 mg (8 mg IntraVENous Given 2/18/23 1753)   morphine injection 4 mg (4 mg IntraVENous Given 2/18/23 1753)   iopamidol (ISOVUE-370) 76 % injection 100 mL (100 mLs IntraVENous Given 2/18/23 1951)   morphine injection 4 mg (4 mg IntraVENous Given 2/18/23 2204)   piperacillin-tazobactam (ZOSYN) 4,500 mg in sodium chloride 0.9 % 100 mL IVPB (mini-bag) (0 mg IntraVENous Stopped 2/18/23 2223)       New Prescriptions    No medications on file        Miladis Fairbanks is a 61 y.o. male who presents to the Emergency Department with chief complaint of    Chief Complaint   Patient presents with    Abdominal Pain      14-year-old  male that is postop day #2 status post EUS with pancreatic biopsy presented to the emergency department with complaints of severe upper abdominal pain. Patient states that he felt okay after having surgery on Thursday. He was able to have a small breakfast after the procedure and he tolerated that well. He states that he then had chicken noodle soup for dinner on Thursday night and shortly after started having significant pain. Patient states that since the pain began on Thursday night it has been persistent. He has been taking his medications without improvement. He states that he also had subjective fevers and chills last night. Patient describes the pain as sharp and severe. It is in his upper abdomen and radiates into his back. He has had nausea without vomiting.   He states that his urine has been dark. Review of patient's medical record and procedure note revealed that patient had fibrous changes in his pancreas consistent with chronic pancreatitis however patient had not had any problems with his upper abdomen until about a month ago when he was diagnosed with a biliary obstruction. Patient denies any chest pain or shortness of breath, changes in bowel or bladder habits or any other concerns. The history is provided by the patient and medical records. Review of Systems   Constitutional:  Positive for chills and fever. HENT: Negative. Respiratory: Negative. Cardiovascular: Negative. Gastrointestinal:  Positive for abdominal pain and nausea. Genitourinary: Negative. Musculoskeletal: Negative. Skin: Negative. Psychiatric/Behavioral: Negative. All other systems reviewed and are negative. Past Medical History:   Diagnosis Date    GERD (gastroesophageal reflux disease)     medication    Sleep apnea     has a CPAP but does not use        Past Surgical History:   Procedure Laterality Date    COLONOSCOPY      ERCP N/A 2/1/2023    ERCP SPHINCTER/PAPILLOTOMY/BALLOON SWEEP/STENT PLACEMENT performed by Jo Ann Mcmahan MD at [facility] ENDOSCOPY    ERCP W/ PLASTIC STENT PLACEMENT  02/01/2023    SINUS SURGERY      x3    VASECTOMY          No family history on file. Social History     Socioeconomic History    Marital status:    Tobacco Use    Smoking status: Every Day     Packs/day: 0.50     Years: 40.00     Pack years: 20.00     Types: Cigarettes    Smokeless tobacco: Never   Vaping Use    Vaping Use: Never used   Substance and Sexual Activity    Alcohol use: Not Currently    Drug use: Not Currently         Patient has no known allergies.      Previous Medications    HYDROCORTISONE (ANUSOL-HC) 25 MG SUPPOSITORY    Place 25 mg rectally 2 times daily as needed    ONDANSETRON (ZOFRAN-ODT) 4 MG DISINTEGRATING TABLET    Take 4 mg by mouth 3 times daily as needed PANTOPRAZOLE (PROTONIX) 20 MG TABLET    Take 20 mg by mouth 2 times daily    SUCRALFATE (CARAFATE) 1 GM TABLET    Take 1 g by mouth 3 times daily        Vitals signs and nursing note reviewed. Patient Vitals for the past 4 hrs:   BP SpO2   02/18/23 2023 -- 95 %   02/18/23 2015 119/71 94 %          Physical Exam  Vitals and nursing note reviewed. Constitutional:       General: He is in acute distress (Appears uncomfortable). Appearance: He is well-developed. He is not ill-appearing or toxic-appearing. HENT:      Mouth/Throat:      Mouth: Mucous membranes are dry. Comments: Dry mucous membranes  Eyes:      General: Scleral icterus present. Cardiovascular:      Rate and Rhythm: Regular rhythm. Tachycardia present. Heart sounds: Normal heart sounds. Pulmonary:      Effort: Pulmonary effort is normal.   Abdominal:      General: Abdomen is flat. Palpations: Abdomen is soft. Tenderness: There is abdominal tenderness in the right upper quadrant and epigastric area. There is guarding. There is no rebound. Positive signs include Pop's sign. Musculoskeletal:         General: Normal range of motion. Cervical back: Normal range of motion. Skin:     General: Skin is warm and dry. Neurological:      General: No focal deficit present. Mental Status: He is alert and oriented to person, place, and time.    Psychiatric:         Mood and Affect: Mood normal.         Behavior: Behavior normal.        Critical Care  Performed by: Joni Diamond MD  Authorized by: Joni Diamond MD     Critical care provider statement:     Critical care time (minutes):  65    Critical care was necessary to treat or prevent imminent or life-threatening deterioration of the following conditions:  Sepsis    Critical care was time spent personally by me on the following activities:  Blood draw for specimens, development of treatment plan with patient or surrogate, discussions with consultants, evaluation of patient's response to treatment, examination of patient, obtaining history from patient or surrogate, ordering and performing treatments and interventions, ordering and review of laboratory studies, ordering and review of radiographic studies, pulse oximetry, re-evaluation of patient's condition and review of old charts    Care discussed with: admitting provider    Comments:      Numerous discussions with radiology, general surgery, gastroenterology and hospitalist in coordinating plan of care and the etiology of patient's pain and infection. Patient given antibiotics, pain medications and fluids. Results for orders placed or performed during the hospital encounter of 02/18/23   Blood Culture 1    Specimen: Blood   Result Value Ref Range    Special Requests LEFT  Antecubital        Culture PENDING    CT ABDOMEN PELVIS W IV CONTRAST Additional Contrast? None    Addendum: 2/18/2023    ADDENDUM:     Case discussed with MD.     Axial image 29 appears to show fluid in the lumen of the gallbladder   communicating with fluid in the lumen of the colon. This is a subtle finding;   artifact cannot be completely excluded. Coronal images 23 and 24 show extraluminal fluid density abutting the superior   margin of the colon where it abuts the gallbladder. Jarocho Hilton M.D.   2/18/2023 10:25:00 PM      Addendum: 2/18/2023    ADDENDUM:    Stephanie Hilton M.D.   2/18/2023 9:07:00 PM      Narrative    EXAMINATION: CT SCAN OF THE ABDOMEN AND PELVIS WITH INTRAVENOUS CONTRAST    DATE OF EXAM: 2/18/2023 5:15 PM    HISTORY: upper abd pain s/p ERCP    COMPARISON: None. TECHNIQUE: CT examination of the abdomen and pelvis was performed following the   intravenous administration of 100 mL of Isovue-370. CT dose lowering techniques   were used, to include: automated exposure control, adjustment for patient size,   and/or use of iterative reconstruction.     FINDINGS:    ABDOMEN/PELVIS:    Lower Chest: Scattered atelectasis in the lung bases. Liver: Normal.     Gallbladder/Billary: The gallbladder is inflamed. A choledochocolonic fistula is   seen (2/29), with gas residing within the lumen of the gallbladder. A biliary   stent is in good position. Pancreas: Normal.     Spleen: Normal.     Adrenal Glands: Normal.     Kidneys: Normal.     GI Tract: The stomach and duodenum are unremarkable. Normal appendix. Normal   small bowel. Mesentery/Peritoneum: Hazy edema and inflammation in the upper central   mesentery. No free intraperitoneal air. Vasculature: Normal.     Lymph Nodes: Normal.     Abdominal Wall: See musculoskeletal section. Bladder: Normal.     Reproductive: Normal.     Musculoskeletal: Normal.      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 2. Biliary stent in good position.        aFbiola Christopher M.D.   2/18/2023 8:58:00 PM   CBC with Auto Differential   Result Value Ref Range    WBC 15.8 (H) 4.3 - 11.1 K/uL    RBC 5.36 4.23 - 5.6 M/uL    Hemoglobin 16.7 13.6 - 17.2 g/dL    Hematocrit 48.5 41.1 - 50.3 %    MCV 90.5 82 - 102 FL    MCH 31.2 26.1 - 32.9 PG    MCHC 34.4 31.4 - 35.0 g/dL    RDW 12.3 11.9 - 14.6 %    Platelets 070 906 - 227 K/uL    MPV 10.0 9.4 - 12.3 FL    nRBC 0.00 0.0 - 0.2 K/uL    Differential Type AUTOMATED      Seg Neutrophils 83 (H) 43 - 78 %    Lymphocytes 9 (L) 13 - 44 %    Monocytes 7 4.0 - 12.0 %    Eosinophils % 0 (L) 0.5 - 7.8 %    Basophils 0 0.0 - 2.0 %    Immature Granulocytes 1 0.0 - 5.0 %    Segs Absolute 13.0 (H) 1.7 - 8.2 K/UL    Absolute Lymph # 1.4 0.5 - 4.6 K/UL    Absolute Mono # 1.2 0.1 - 1.3 K/UL    Absolute Eos # 0.1 0.0 - 0.8 K/UL    Basophils Absolute 0.1 0.0 - 0.2 K/UL    Absolute Immature Granulocyte 0.1 0.0 - 0.5 K/UL   CMP   Result Value Ref Range    Sodium 130 (L) 133 - 143 mmol/L    Potassium 3.7 3.5 - 5.1 mmol/L    Chloride 102 101 - 110 mmol/L    CO2 23 21 - 32 mmol/L    Anion Gap 5 2 - 11 mmol/L    Glucose 117 (H) 65 - 100 mg/dL    BUN 10 6 - 23 MG/DL    Creatinine 1.00 0.8 - 1.5 MG/DL    Est, Glom Filt Rate >60 >60 ml/min/1.73m2    Calcium 9.2 8.3 - 10.4 MG/DL    Total Bilirubin 1.4 (H) 0.2 - 1.1 MG/DL    ALT 42 12 - 65 U/L    AST 12 (L) 15 - 37 U/L    Alk Phosphatase 119 50 - 136 U/L    Total Protein 8.3 (H) 6.3 - 8.2 g/dL    Albumin 3.4 (L) 3.5 - 5.0 g/dL    Globulin 4.9 (H) 2.8 - 4.5 g/dL    Albumin/Globulin Ratio 0.7 0.4 - 1.6     Lactate, Sepsis   Result Value Ref Range    Lactic Acid, Sepsis 1.8 0.4 - 2.0 MMOL/L   Lipase   Result Value Ref Range    Lipase 1,088 (H) 73 - 393 U/L   Magnesium   Result Value Ref Range    Magnesium 1.8 1.8 - 2.4 mg/dL   Procalcitonin   Result Value Ref Range    Procalcitonin 0.31 0.00 - 0.49 ng/mL        CT ABDOMEN PELVIS W IV CONTRAST Additional Contrast? None   Final Result   Addendum (preliminary) 2 of 2   ADDENDUM:       Case discussed with MD.       Axial image 29 appears to show fluid in the lumen of the gallbladder    communicating with fluid in the lumen of the colon. This is a subtle    finding;    artifact cannot be completely excluded. Coronal images 23 and 24 show extraluminal fluid density abutting the    superior    margin of the colon where it abuts the gallbladder. Lori Galicia M.D.    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 2. Biliary stent in good position. Lori Galicia M.D.    2/18/2023 8:58:00 PM                          Voice dictation software was used during the making of this note. This software is not perfect and grammatical and other typographical errors may be present. This note has not been completely proofread for errors.       Francisco Chadwick MD  02/18/23 1721

## 2023-02-19 LAB
ALBUMIN SERPL-MCNC: 2.7 G/DL (ref 3.5–5)
ALBUMIN/GLOB SERPL: 0.7 (ref 0.4–1.6)
ALP SERPL-CCNC: 97 U/L (ref 50–136)
ALT SERPL-CCNC: 15 U/L (ref 12–65)
ANION GAP SERPL CALC-SCNC: 6 MMOL/L (ref 2–11)
AST SERPL-CCNC: 8 U/L (ref 15–37)
BASOPHILS # BLD: 0.1 K/UL (ref 0–0.2)
BASOPHILS NFR BLD: 0 % (ref 0–2)
BILIRUB SERPL-MCNC: 1.4 MG/DL (ref 0.2–1.1)
BUN SERPL-MCNC: 9 MG/DL (ref 6–23)
CALCIUM SERPL-MCNC: 8.5 MG/DL (ref 8.3–10.4)
CHLORIDE SERPL-SCNC: 106 MMOL/L (ref 101–110)
CO2 SERPL-SCNC: 23 MMOL/L (ref 21–32)
CREAT SERPL-MCNC: 0.8 MG/DL (ref 0.8–1.5)
DIFFERENTIAL METHOD BLD: ABNORMAL
EKG ATRIAL RATE: 86 BPM
EKG DIAGNOSIS: NORMAL
EKG P AXIS: 78 DEGREES
EKG P-R INTERVAL: 172 MS
EKG Q-T INTERVAL: 344 MS
EKG QRS DURATION: 96 MS
EKG QTC CALCULATION (BAZETT): 411 MS
EKG R AXIS: -20 DEGREES
EKG T AXIS: 60 DEGREES
EKG VENTRICULAR RATE: 86 BPM
EOSINOPHIL # BLD: 0 K/UL (ref 0–0.8)
EOSINOPHIL NFR BLD: 0 % (ref 0.5–7.8)
ERYTHROCYTE [DISTWIDTH] IN BLOOD BY AUTOMATED COUNT: 12.4 % (ref 11.9–14.6)
GLOBULIN SER CALC-MCNC: 3.9 G/DL (ref 2.8–4.5)
GLUCOSE SERPL-MCNC: 89 MG/DL (ref 65–100)
HCT VFR BLD AUTO: 43.1 % (ref 41.1–50.3)
HGB BLD-MCNC: 14.6 G/DL (ref 13.6–17.2)
IMM GRANULOCYTES # BLD AUTO: 0.1 K/UL (ref 0–0.5)
IMM GRANULOCYTES NFR BLD AUTO: 1 % (ref 0–5)
LIPASE SERPL-CCNC: 580 U/L (ref 73–393)
LYMPHOCYTES # BLD: 2.1 K/UL (ref 0.5–4.6)
LYMPHOCYTES NFR BLD: 15 % (ref 13–44)
MCH RBC QN AUTO: 31.4 PG (ref 26.1–32.9)
MCHC RBC AUTO-ENTMCNC: 33.9 G/DL (ref 31.4–35)
MCV RBC AUTO: 92.7 FL (ref 82–102)
MONOCYTES # BLD: 1 K/UL (ref 0.1–1.3)
MONOCYTES NFR BLD: 8 % (ref 4–12)
NEUTS SEG # BLD: 10.4 K/UL (ref 1.7–8.2)
NEUTS SEG NFR BLD: 76 % (ref 43–78)
NRBC # BLD: 0 K/UL (ref 0–0.2)
PLATELET # BLD AUTO: 202 K/UL (ref 150–450)
PMV BLD AUTO: 9.8 FL (ref 9.4–12.3)
POTASSIUM SERPL-SCNC: 3.7 MMOL/L (ref 3.5–5.1)
PROT SERPL-MCNC: 6.6 G/DL (ref 6.3–8.2)
RBC # BLD AUTO: 4.65 M/UL (ref 4.23–5.6)
SODIUM SERPL-SCNC: 135 MMOL/L (ref 133–143)
WBC # BLD AUTO: 13.7 K/UL (ref 4.3–11.1)

## 2023-02-19 PROCEDURE — 85025 COMPLETE CBC W/AUTO DIFF WBC: CPT

## 2023-02-19 PROCEDURE — C9113 INJ PANTOPRAZOLE SODIUM, VIA: HCPCS | Performed by: INTERNAL MEDICINE

## 2023-02-19 PROCEDURE — 99223 1ST HOSP IP/OBS HIGH 75: CPT | Performed by: SURGERY

## 2023-02-19 PROCEDURE — 1100000003 HC PRIVATE W/ TELEMETRY

## 2023-02-19 PROCEDURE — 93005 ELECTROCARDIOGRAM TRACING: CPT | Performed by: INTERNAL MEDICINE

## 2023-02-19 PROCEDURE — A4216 STERILE WATER/SALINE, 10 ML: HCPCS | Performed by: INTERNAL MEDICINE

## 2023-02-19 PROCEDURE — 2580000003 HC RX 258: Performed by: INTERNAL MEDICINE

## 2023-02-19 PROCEDURE — 6360000002 HC RX W HCPCS: Performed by: INTERNAL MEDICINE

## 2023-02-19 PROCEDURE — 83690 ASSAY OF LIPASE: CPT

## 2023-02-19 PROCEDURE — 87040 BLOOD CULTURE FOR BACTERIA: CPT

## 2023-02-19 PROCEDURE — 36415 COLL VENOUS BLD VENIPUNCTURE: CPT

## 2023-02-19 PROCEDURE — 80053 COMPREHEN METABOLIC PANEL: CPT

## 2023-02-19 RX ORDER — MORPHINE SULFATE 4 MG/ML
4 INJECTION, SOLUTION INTRAMUSCULAR; INTRAVENOUS EVERY 4 HOURS PRN
Status: DISCONTINUED | OUTPATIENT
Start: 2023-02-19 | End: 2023-02-20

## 2023-02-19 RX ADMIN — PIPERACILLIN AND TAZOBACTAM 3375 MG: 3; .375 INJECTION, POWDER, LYOPHILIZED, FOR SOLUTION INTRAVENOUS at 20:38

## 2023-02-19 RX ADMIN — SODIUM CHLORIDE: 9 INJECTION, SOLUTION INTRAVENOUS at 00:10

## 2023-02-19 RX ADMIN — SODIUM CHLORIDE: 9 INJECTION, SOLUTION INTRAVENOUS at 16:37

## 2023-02-19 RX ADMIN — PANTOPRAZOLE SODIUM 40 MG: 40 INJECTION, POWDER, LYOPHILIZED, FOR SOLUTION INTRAVENOUS at 00:28

## 2023-02-19 RX ADMIN — MORPHINE SULFATE 2 MG: 2 INJECTION, SOLUTION INTRAMUSCULAR; INTRAVENOUS at 20:38

## 2023-02-19 RX ADMIN — PIPERACILLIN AND TAZOBACTAM 3375 MG: 3; .375 INJECTION, POWDER, LYOPHILIZED, FOR SOLUTION INTRAVENOUS at 04:04

## 2023-02-19 RX ADMIN — SODIUM CHLORIDE, PRESERVATIVE FREE 10 ML: 5 INJECTION INTRAVENOUS at 20:40

## 2023-02-19 RX ADMIN — MORPHINE SULFATE 2 MG: 2 INJECTION, SOLUTION INTRAMUSCULAR; INTRAVENOUS at 16:29

## 2023-02-19 RX ADMIN — MORPHINE SULFATE 2 MG: 2 INJECTION, SOLUTION INTRAMUSCULAR; INTRAVENOUS at 02:28

## 2023-02-19 RX ADMIN — PIPERACILLIN AND TAZOBACTAM 3375 MG: 3; .375 INJECTION, POWDER, LYOPHILIZED, FOR SOLUTION INTRAVENOUS at 11:57

## 2023-02-19 ASSESSMENT — PAIN - FUNCTIONAL ASSESSMENT: PAIN_FUNCTIONAL_ASSESSMENT: PREVENTS OR INTERFERES SOME ACTIVE ACTIVITIES AND ADLS

## 2023-02-19 ASSESSMENT — PAIN DESCRIPTION - LOCATION
LOCATION: ABDOMEN
LOCATION: ABDOMEN

## 2023-02-19 ASSESSMENT — PAIN DESCRIPTION - ORIENTATION
ORIENTATION: RIGHT;UPPER;ANTERIOR
ORIENTATION: RIGHT

## 2023-02-19 ASSESSMENT — PAIN SCALES - GENERAL
PAINLEVEL_OUTOF10: 2
PAINLEVEL_OUTOF10: 5
PAINLEVEL_OUTOF10: 5
PAINLEVEL_OUTOF10: 8
PAINLEVEL_OUTOF10: 7

## 2023-02-19 ASSESSMENT — PAIN DESCRIPTION - DESCRIPTORS: DESCRIPTORS: SHOOTING;SHARP

## 2023-02-19 ASSESSMENT — PAIN SCALES - WONG BAKER
WONGBAKER_NUMERICALRESPONSE: 0
WONGBAKER_NUMERICALRESPONSE: 0

## 2023-02-19 NOTE — CONSULTS
Gastroenterology     Please refer to consult note by Dr. Laney Griffith on 2/18/23.        Emile Merlin, APRN-MAX  [facility]

## 2023-02-19 NOTE — PROGRESS NOTES
.  [facility] Progress Note             Date: 2/19/2023    GI Problem:    History of Present Illness:  Feels better. less abdominal pain. Lipase decreased to 580. WBC down to 13.7 K. Hospital Medications:  Current Facility-Administered Medications   Medication Dose Route Frequency    0.9 % sodium chloride bolus  1,000 mL IntraVENous Once    sodium chloride flush 0.9 % injection 5-40 mL  5-40 mL IntraVENous 2 times per day    sodium chloride flush 0.9 % injection 5-40 mL  5-40 mL IntraVENous PRN    0.9 % sodium chloride infusion   IntraVENous PRN    ondansetron (ZOFRAN-ODT) disintegrating tablet 4 mg  4 mg Oral Q8H PRN    Or    ondansetron (ZOFRAN) injection 4 mg  4 mg IntraVENous Q6H PRN    polyethylene glycol (GLYCOLAX) packet 17 g  17 g Oral Daily PRN    acetaminophen (TYLENOL) tablet 650 mg  650 mg Oral Q6H PRN    Or    acetaminophen (TYLENOL) suppository 650 mg  650 mg Rectal Q6H PRN    0.9 % sodium chloride infusion   IntraVENous Continuous    piperacillin-tazobactam (ZOSYN) 3,375 mg in sodium chloride 0.9 % 50 mL IVPB (mini-bag)  3,375 mg IntraVENous q8h    morphine injection 2 mg  2 mg IntraVENous Q4H PRN    pantoprazole (PROTONIX) 40 mg in sodium chloride (PF) 0.9 % 10 mL injection  40 mg IntraVENous Daily       Objective:     Physical Exam:  Vitals:  Visit Vitals  /72   Pulse 82   Temp 98.1 °F (36.7 °C) (Oral)   Resp 18   Ht 6' (1.829 m)   Wt 194 lb 10.7 oz (88.3 kg)   SpO2 (!) 89%   BMI 26.40 kg/m²       General: No acute distress. Skin:  Extremities and face reveal no rashes  HEENT: Sclerae anicteric. No oral ulcers. No abnormal pigmentation of the lips. The neck is supple. Cardiovascular: Regular rate and rhythm. No murmurs, gallops, or rubs. Respiratory:  Comfortable breathing  With no accessory muscle use. Clear breath sounds with no wheezes, rales, or rhonchi. GI:  Abdomen nondistended, soft, and mildly tender in epigastrium and RUQ. Normal active bowel sounds. 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

## 2023-02-19 NOTE — CONSULTS
[facility] Consult Note       Primary GI Physician: Niki Jackman MD    Referring Provider:     Consult Date:  2/18/2023    Admit Date:  2/18/2023    Chief Complaint:  Abdominal pain    Subjective:     History of Present Illness:  Patient is a 61 y.o. male with PMH as below. In early January he was having not only GERD sx but nausea, dyspepsia. An US on 1/26/23 showed biliary dilation with a CBD at 10 mm and some intrahepatic dilation. The GB showed sludge. The PD in the body was dilated to 4 mm. The head an tail were not well seen. Labs from 1/25 revealed a Tbili of 1.8 and ALT of 117 and an AST of 54. AP was 385 and lipase was 411. Dr Domenica Scott did an ERCP on 2-1-23:  a biliary stricture was just proximal to the ampulla. A papillotomy was done and sludge and a cloudy extract were seen. A 12 mm balloon was able to be pulled through this area. The PD was not able to be cannulated. The GB had sludge and small stones. An EUS was then done to evaluate for a pancreatic head mass. This was done on 2-16-23 by Dr Apoorva Loco. No mass was seen. Changes of chronic pancreatitis were seen. An FNA of the biliary stricture was done. The stricture was thought to be mostly likely from fibrosis. A Pseudocyt was seen in the uncinate process. Patient presents tonight with increasing abdominal pain in the epigastrium and RUQ radiating to his back. Lipase is 1,088. Tbili is 1.4. WBC is 15.8. CT A/P shows an inflamed GB  and a possible choledocho colonic fistula. The stent is in good position.       PMH:  Past Medical History:   Diagnosis Date    GERD (gastroesophageal reflux disease)     medication    Sleep apnea     has a CPAP but does not use       PSH:  Past Surgical History:   Procedure Laterality Date    COLONOSCOPY      ERCP N/A 2/1/2023    ERCP SPHINCTER/PAPILLOTOMY/BALLOON SWEEP/STENT PLACEMENT performed by Terrance Cedillo MD at [facility] ENDOSCOPY    ERCP [address] 02/01/2023    SINUS SURGERY      x3    VASECTOMY         Allergies:  No Known Allergies    Home Medications:  Prior to Admission medications    Medication Sig Start Date End Date Taking? Authorizing Provider   pantoprazole (PROTONIX) 20 MG tablet Take 20 mg by mouth 2 times daily 1/19/23   Historical Provider, MD   sucralfate (CARAFATE) 1 GM tablet Take 1 g by mouth 3 times daily 1/19/23   Historical Provider, MD   ondansetron (ZOFRAN-ODT) 4 MG disintegrating tablet Take 4 mg by mouth 3 times daily as needed 1/26/23   Historical Provider, MD   hydrocortisone (ANUSOL-HC) 25 MG suppository Place 25 mg rectally 2 times daily as needed 10/14/22   Historical Provider, MD       Hospital Medications:  Current Facility-Administered Medications   Medication Dose Route Frequency    0.9 % sodium chloride bolus  1,000 mL IntraVENous Once    0.9 % sodium chloride bolus  1,000 mL IntraVENous Once     Current Outpatient Medications   Medication Sig    pantoprazole (PROTONIX) 20 MG tablet Take 20 mg by mouth 2 times daily    sucralfate (CARAFATE) 1 GM tablet Take 1 g by mouth 3 times daily    ondansetron (ZOFRAN-ODT) 4 MG disintegrating tablet Take 4 mg by mouth 3 times daily as needed    hydrocortisone (ANUSOL-HC) 25 MG suppository Place 25 mg rectally 2 times daily as needed       Social History:  Social History     Tobacco Use    Smoking status: Every Day     Packs/day: 0.50     Years: 40.00     Pack years: 20.00     Types: Cigarettes    Smokeless tobacco: Never   Substance Use Topics    Alcohol use: Not Currently       Pt denies any history of drug use, blood transfusions, or tattoos. Family History:  No family history on file. Review of Systems:  A detailed 10 system ROS is obtained, with pertinent positives as listed above. All others are negative.     Diet:      Objective:     Physical Exam:  Vitals:  /71   Pulse (!) 120   Temp 98.7 °F (37.1 °C) (Oral)   Resp 16   Ht 6' (1.829 m)   Wt 180 lb (81.6 kg) SpO2 95%   BMI 24.41 kg/m²   Gen:  Pt is alert, cooperative, no acute distress  Has gotten MSO4 in the ER. Skin:  Extremities and face reveal no rashes. HEENT: Sclerae anicteric. Cardiovascular: Tachy. . No murmurs, gallops, or rubs. Respiratory:  Comfortable breathing with no accessory muscle use. Clear breath sounds anteriorly with no wheezes, rales, or rhonchi. GI:  Abdomen nondistended, soft and minimally tender in the epigastrium and RUQ. Normal active bowel sounds. No enlargement of the liver or spleen. No masses palpable. Rectal:  Deferred    Laboratory:    Recent Labs     02/18/23  1731   WBC 15.8*   HGB 16.7   HCT 48.5      MCV 90.5   *   K 3.7      CO2 23   BUN 10   MG 1.8   AST 12*   ALT 42          Assessment:     Principal Problem:    Sepsis ([address]  Active Problems:    Biliary stricture    Abdominal pain    Hyponatremia  Resolved Problems:    * No resolved hospital problems.  *      Plan:     -NPO except ice, IV hydration, pain control  -IV antibiotics, Zosyn  -IV PPIs  -IM help appreciated  -Surgical consult    Carry MD Hector

## 2023-02-19 NOTE — ED NOTES
TRANSFER - OUT REPORT:    Verbal report given to RN on Jose March.  being transferred to 2nd floor for routine progression of patient care       Report consisted of patient's Situation, Background, Assessment and   Recommendations(SBAR). Information from the following report(s) Nurse Handoff Report, ED Encounter Summary, ED SBAR, MAR, Recent Results, and Neuro Assessment was reviewed with the receiving nurse. [place] Assessment: Presents to emergency department  because of falls (Syncope, seizure, or loss of consciousness): No, Age > 79: No, Altered Mental Status, Intoxication with alcohol or substance confusion (Disorientation, impaired judgment, poor safety awaremess, or inability to follow instructions): No, Impaired Mobility: Ambulates or transfers with assistive devices or assistance; Unable to ambulate or transer.: No, Nursing Judgement: No  Lines:   Peripheral IV 02/18/23 Left Antecubital (Active)   Site Assessment Clean, dry & intact 02/18/23 1734   Line Status Blood return noted; Flushed;Normal saline locked 02/18/23 1734   Phlebitis Assessment No symptoms 02/18/23 1734   Infiltration Assessment 0 02/18/23 1734        Opportunity for questions and clarification was provided.       Patient transported with:  Registered Nurse           Velia Lemon RN  02/18/23 0352

## 2023-02-19 NOTE — PROGRESS NOTES
Comprehensive Nutrition Assessment    Type and Reason for Visit: Initial, Positive Nutrition Screen  Malnutrition Screening Tool: Malnutrition Screen  Have you recently lost weight without trying?: 2 to 13 pounds (1 point)  Have you been eating poorly because of a decreased appetite?: Yes (1 point)  Malnutrition Screening Tool Score: 2    Nutrition Recommendations/Plan:   Continue NPO. Advance diet as medically able     Malnutrition Assessment:  Malnutrition Status: At risk for malnutrition (Comment) (weight loss, variable po over last 2 months)       Nutrition Assessment:  Nutrition History: Patient reports that prior to Feb 1st he has been losing weight and not eating well. He reports usual weight of 210 lbs and got down to about 183#s before the procedure 2/1/23. He says following the procedure we started to eat again very well with no issues and gained weight back to at least 190 #s. He reports he than had procedure on 2/16 following that he has been getting severe abdominal pain that is worse when eating/drinking. He reports able to eat breakfast Friday morning with no N/V but significant pain. Do You Have Any Cultural, Jewish, or Ethnic Food Preferences?: No   Nutrition Background:   Wound Type: None   recent GI complaints followed by GI s/p ERCP with biliary stent on 2-1-23, EUS w/ FNA 2-16-23 who presented due to abdominal pain. GI has mentioned pancreatitis flare up to patient and in note mentioned possible colonic fistula. Awaiting surgical consult per GI note. Nutrition Interval:  Patient is currently NPO, he asked a general idea of what the plan for pancreatitis is. Explained GI rest and then advancement through clears liquids and liquids. Patient reports still having abdominal pain today even though not consuming anything.       Current Nutrition Therapies:  Diet NPO    Current Intake:   Average Meal Intake: NPO Average Supplements Intake: NPO      Anthropometric Measures:  Height: 6' (182.9 cm)  Current Body Wt: 194 lb 10.7 oz (88.3 kg) (2/18/23), Weight source: Bed Scale  BMI: 26.4, Overweight (BMI 25.0-29. 9)     Ideal Body Weight (Kg) (Calculated): 81 kg (178 lbs), 109.4 %  Usual Body Wt: 210 lb (95.3 kg), Percent weight change: -7.3       BMI Category Overweight (BMI 25.0-29. 9)  Estimated Daily Nutrient Needs:  Energy (kcal/day): 0271-6539 (20-25 kcal/kg) (Kcal/kg Weight used: 88.3 kg Current  Protein (g/day):  (1-1.2 g/kg) Weight Used: (Current) 88.3 kg  Fluid (ml/day):   (1 ml/kcal)    Nutrition Diagnosis:   Inadequate oral intake related to acute injury/trauma (pancreatitits) as evidenced by poor intake prior to admission, NPO status  Nutrition Interventions:   Food and/or Nutrient Delivery: Continue NPO     Coordination of Nutrition Care: Continue to monitor while inpatient       Goals: Active Goal: Initiate PO diet, by next RD assessment       Nutrition Monitoring and Evaluation:      Food/Nutrient Intake Outcomes: Diet Advancement/Tolerance  Physical Signs/Symptoms Outcomes: GI Status, Weight, Biochemical Data    Discharge Planning:     Too soon to determine    Electronically signed by Milly Regalado MS, RD, LD on 2/19/2023 at 3:04 PM.

## 2023-02-19 NOTE — H&P
Hospitalist History and Physical   Admit Date:  2023  5:20 PM   Name:  Sylvia Prescott. Age:  61 y.o. Sex:  male  :  1965   MRN:  [id number]   Room:  ER03/03    Presenting Complaint: Abdominal Pain     Reason(s) for Admission: Sepsis [facility]) [A41.9]     History of Present Illness:       Sylvia Clark is a 61 y.o. male with medical history of tobacco use (stopped today), recent GI complaints followed by GI s/p ERCP with biliary stent on 23, EUS with FNA  who is evaluated with abdominal pain. Started s/p ERCP and ongoing but worse after FFNA. Has lost weight, nausea with eating, has dark urine. Pain moves to his back. Had fever. No emesis. Improved in ED s/p pain meds. 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 2. Biliary stent in good position. Jinny Scott M.D. \"      Lipase elevated. Meets sepsis criteria due to tachycardia and leukocytosis. Surgery and GI notified by ED. Case discussed with myself, Dr. Rubén Pedersen of GI and Dr. Lindsay Marc of ED. FULL CODE. Daughter Erik Smith [phone]    Assessment & Plan:     Principal Problem:    Sepsis [facility])  Plan: Active Problems:    Biliary stricture  Plan:     Abdominal pain  Plan:   Pancreatitis   Admit remote tele  NPO   cc/hr and reassess  Morphine for pain  Surgery and GI consulted   D1 zosyn  Followup blood cultures   Check EKG              Hyponatremia  Plan:     Trend BMP with IVF       PT/OT evals and PPD needed/ordered? No    Diet: No diet orders on file  VTE prophylaxis: SCD's   Code status: No Order    Hospital Problems:  Principal Problem:    Sepsis ([address]  Active Problems:    Biliary stricture    Abdominal pain    Hyponatremia  Resolved Problems:    * No resolved hospital problems.  *       Past History:     Past Medical History:   Diagnosis Date    GERD (gastroesophageal reflux disease)     medication    Sleep apnea     has a CPAP but does not use       Past Surgical History:   Procedure Laterality Date    COLONOSCOPY      ERCP N/A 2/1/2023    ERCP SPHINCTER/PAPILLOTOMY/BALLOON SWEEP/STENT PLACEMENT performed by Nhan Delaney MD at [facility] ENDOSCOPY    ERCP W/ PLASTIC STENT PLACEMENT  02/01/2023    SINUS SURGERY      x3    VASECTOMY          Social History     Tobacco Use    Smoking status: Every Day     Packs/day: 0.50     Years: 40.00     Pack years: 20.00     Types: Cigarettes    Smokeless tobacco: Never   Substance Use Topics    Alcohol use: Not Currently      Social History     Substance and Sexual Activity   Drug Use Not Currently       FH- CAD    Immunization History   Administered Date(s) Administered    COVID-19, J&J, (age 18y+), IM, 0.5 mL 04/08/2021     No Known Allergies  Prior to Admit Medications:  Current Outpatient Medications   Medication Instructions    hydrocortisone (ANUSOL-HC) 25 mg, Rectal, 2 TIMES DAILY PRN    ondansetron (ZOFRAN-ODT) 4 mg, Oral, 3 TIMES DAILY PRN    pantoprazole (PROTONIX) 20 mg, Oral, 2 TIMES DAILY    sucralfate (CARAFATE) 1 g, Oral, 3 TIMES DAILY         Objective:   Patient Vitals for the past 24 hrs:   Temp Pulse Resp BP SpO2   02/18/23 2023 -- -- -- -- 95 %   02/18/23 2015 -- -- -- 119/71 94 %   02/18/23 1716 98.7 °F (37.1 °C) (!) 120 16 (!) 115/91 96 %       Oxygen Therapy  SpO2: 95 %  Pulse Oximetry Type: Intermittent  O2 Device: None (Room air)    Estimated body mass index is 24.41 kg/m² as calculated from the following:    Height as of this encounter: 6' (1.829 m). Weight as of this encounter: 180 lb (81.6 kg). No intake or output data in the 24 hours ending 02/18/23 2300      Physical Exam:    Blood pressure 119/71, pulse (!) 120, temperature 98.7 °F (37.1 °C), temperature source Oral, resp. rate 16, height 6' (1.829 m), weight 180 lb (81.6 kg), SpO2 95 %. General:    Well nourished.   Alert, no distress, pleasant   Head:  Normocephalic, atraumatic  Eyes:  Sclerae appear normal.  Pupils equally round. ENT:  Nares appear normal.  Moist oral mucosa  Neck:  No restricted ROM. Trachea midline   CV:   RRR. No m/r/g. No jugular venous distension. No edema   Lungs:   CTAB. No wheezing, rhonchi, or rales. Symmetric expansion. Abdomen:   Soft, tender midepigastric area, nondistended. Decreased BS  Extremities: No cyanosis or clubbing. No edema  Skin:     No rashes and normal coloration. Warm and dry. Neuro:  grossly intact. Psych:  Normal mood and affect.       I have personally reviewed labs and tests:  Recent Labs:  Recent Results (from the past 24 hour(s))   CBC with Auto Differential    Collection Time: 02/18/23  5:31 PM   Result Value Ref Range    WBC 15.8 (H) 4.3 - 11.1 K/uL    RBC 5.36 4.23 - 5.6 M/uL    Hemoglobin 16.7 13.6 - 17.2 g/dL    Hematocrit 48.5 41.1 - 50.3 %    MCV 90.5 82 - 102 FL    MCH 31.2 26.1 - 32.9 PG    MCHC 34.4 31.4 - 35.0 g/dL    RDW 12.3 11.9 - 14.6 %    Platelets 201 643 - 206 K/uL    MPV 10.0 9.4 - 12.3 FL    nRBC 0.00 0.0 - 0.2 K/uL    Differential Type AUTOMATED      Seg Neutrophils 83 (H) 43 - 78 %    Lymphocytes 9 (L) 13 - 44 %    Monocytes 7 4.0 - 12.0 %    Eosinophils % 0 (L) 0.5 - 7.8 %    Basophils 0 0.0 - 2.0 %    Immature Granulocytes 1 0.0 - 5.0 %    Segs Absolute 13.0 (H) 1.7 - 8.2 K/UL    Absolute Lymph # 1.4 0.5 - 4.6 K/UL    Absolute Mono # 1.2 0.1 - 1.3 K/UL    Absolute Eos # 0.1 0.0 - 0.8 K/UL    Basophils Absolute 0.1 0.0 - 0.2 K/UL    Absolute Immature Granulocyte 0.1 0.0 - 0.5 K/UL   CMP    Collection Time: 02/18/23  5:31 PM   Result Value Ref Range    Sodium 130 (L) 133 - 143 mmol/L    Potassium 3.7 3.5 - 5.1 mmol/L    Chloride 102 101 - 110 mmol/L    CO2 23 21 - 32 mmol/L    Anion Gap 5 2 - 11 mmol/L    Glucose 117 (H) 65 - 100 mg/dL    BUN 10 6 - 23 MG/DL    Creatinine 1.00 0.8 - 1.5 MG/DL    Est, Glom Filt Rate >60 >60 ml/min/1.73m2    Calcium 9.2 8.3 - 10.4 MG/DL    Total Bilirubin 1.4 (H) 0.2 - 1.1 MG/DL    ALT 42 12 - 65 U/L    AST 12 (L) 15 - 37 U/L    Alk Phosphatase 119 50 - 136 U/L    Total Protein 8.3 (H) 6.3 - 8.2 g/dL    Albumin 3.4 (L) 3.5 - 5.0 g/dL    Globulin 4.9 (H) 2.8 - 4.5 g/dL    Albumin/Globulin Ratio 0.7 0.4 - 1.6     Lactate, Sepsis    Collection Time: 02/18/23  5:31 PM   Result Value Ref Range    Lactic Acid, Sepsis 1.8 0.4 - 2.0 MMOL/L   Lipase    Collection Time: 02/18/23  5:31 PM   Result Value Ref Range    Lipase 1,088 (H) 73 - 393 U/L   Magnesium    Collection Time: 02/18/23  5:31 PM   Result Value Ref Range    Magnesium 1.8 1.8 - 2.4 mg/dL   Procalcitonin    Collection Time: 02/18/23  5:31 PM   Result Value Ref Range    Procalcitonin 0.31 0.00 - 0.49 ng/mL   Blood Culture 1    Collection Time: 02/18/23  9:59 PM    Specimen: Blood   Result Value Ref Range    Special Requests LEFT  Antecubital        Culture PENDING        I have personally reviewed imaging studies:  CT ABDOMEN PELVIS W IV CONTRAST Additional Contrast? None    Addendum Date: 2/18/2023    ADDENDUM: Case discussed with MD. Axial image 29 appears to show fluid in the lumen of the gallbladder communicating with fluid in the lumen of the colon. This is a subtle finding; artifact cannot be completely excluded. Coronal images 23 and 24 show extraluminal fluid density abutting the superior margin of the colon where it abuts the gallbladder. Fabiola Christopher M.D. 2/18/2023 10:25:00 PM    Addendum Date: 2/18/2023    ADDENDUM: Johnnie Christopher M.D. 2/18/2023 9:07:00 PM    Result Date: 2/18/2023  EXAMINATION: CT SCAN OF THE ABDOMEN AND PELVIS WITH INTRAVENOUS CONTRAST DATE OF EXAM: 2/18/2023 5:15 PM HISTORY: upper abd pain s/p ERCP COMPARISON: None. TECHNIQUE: CT examination of the abdomen and pelvis was performed following the intravenous administration of 100 mL of Isovue-370. CT dose lowering techniques were used, to include: automated exposure control, adjustment for patient size, and/or use of iterative reconstruction.  FINDINGS: ABDOMEN/PELVIS: Lower Chest: Scattered atelectasis in the lung bases. Liver: Normal. Gallbladder/Billary: The gallbladder is inflamed. 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 A biliary stent is in good position. Pancreas: Normal. Spleen: Normal. Adrenal Glands: Normal. Kidneys: Normal. GI Tract: The stomach and duodenum are unremarkable. Normal appendix. Normal small bowel. Mesentery/Peritoneum: Hazy edema and inflammation in the upper central mesentery. No free intraperitoneal air. Vasculature: Normal. Lymph Nodes: Normal. Abdominal Wall: See musculoskeletal section. Bladder: Normal. Reproductive: Normal. Musculoskeletal: Normal.     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 2. Biliary stent in good position. Bg Marino M.D. 2/18/2023 8:58:00 PM      Echocardiogram:  No results found for this or any previous visit. Orders Placed This Encounter   Medications    0.9 % sodium chloride bolus    hyoscyamine (LEVSIN/SL) sublingual tablet 125 mcg    ondansetron (ZOFRAN) injection 8 mg    morphine injection 4 mg    iopamidol (ISOVUE-370) 76 % injection 100 mL    morphine injection 4 mg    piperacillin-tazobactam (ZOSYN) 4,500 mg in sodium chloride 0.9 % 100 mL IVPB (mini-bag)     Order Specific Question:   Antimicrobial Indications     Answer:   Intra-Abdominal Infection    0.9 % sodium chloride bolus    0.9 % sodium chloride bolus         Signed:  Rhiannon Gleason MD    Part of this note may have been written by using a voice dictation software. The note has been proof read but may still contain some grammatical/other typographical errors.

## 2023-02-19 NOTE — PROGRESS NOTES
TRANSFER - IN REPORT:    Verbal report received from RN aakash Brewer.  being received from ED for routine progression of patient care      Report consisted of patient's Situation, Background, Assessment and   Recommendations(SBAR). Information from the following report(s) Adult Overview, Intake/Output, MAR, and Recent Results was reviewed with the receiving nurse. Opportunity for questions and clarification was provided. Assessment to be completed upon patient's arrival to unit and care assumed.

## 2023-02-19 NOTE — PROGRESS NOTES
Hospitalist Progress Note   Admit Date:  2023  5:20 PM   Name:  Dilcia Shepard. Age:  61 y.o. Sex:  male  :  1965   MRN:  [id number]   Room:      Presenting Complaint: Abdominal Pain     Reason(s) for Admission: Abdominal pain, epigastric [R10.13]  Cholecystitis [K81.9]  Sepsis ([address] [A41.9]  Acute biliary pancreatitis, unspecified complication status [T38.99]     Hospital Course:   Dilcia Diaz is a 61 y.o. CM with a PMH of tobacco use, recent GI complaints followed by GI s/p ERCP with biliary stent on 23, EUS w/ FNA 23 who presented due to abdominal pain. This started s/p ERCP and ongoing but worse after FNA. He was found to be septic. Lipase elevated. CT a/p indicated a possible choledochocolonic fistula. He was admitted to the hospitalist service for further work-up. GI and general surgery were consulted. .     Subjective & 24hr Events (23): The patient was seen this morning and findings were reviewed with him. He is still having abdominal pain but it is decreased from time of presentation. Continue n.p.o. and IVF. Follow-up on general surgery recommendations. Assessment & Plan:     Principal Problem:  Sepsis ([address]  Abdominal pain  Pancreatitis   Met criteria w/ tachycardia and leukocytosis  NPO  Continue IVF  GI on board  Zosyn, Day 2  Bcx NGTD  Lipase 1,088 --> 580  PRN analgesia     Possible choledochocolonic fistula  CT a/p reviewed  General surgery consulted    Hyponatremia  Resolved      PT/OT evals and PPD needed/ordered? No    Diet:  Diet NPO  VTE prophylaxis: SCD's   Code status: Full Code    Hospital Problems:  Principal Problem:    Sepsis ([address]  Active Problems:    Biliary stricture    Abdominal pain    Hyponatremia    Tobacco use    Pancreatitis  Resolved Problems:    * No resolved hospital problems.  *      Objective:   Patient Vitals for the past 24 hrs:   Temp Pulse Resp BP SpO2   23 1101 98.1 °F (36.7 °C) 82 18 114/72 (!) 89 %   02/19/23 0754 98.8 °F (37.1 °C) 80 19 130/62 (!) 89 %   02/19/23 0343 98.4 °F (36.9 °C) 78 18 116/65 91 %   02/19/23 0258 -- -- 16 -- --   02/19/23 0228 -- -- 16 -- --   02/19/23 0011 -- (!) 102 -- -- --   02/18/23 2345 -- -- -- -- 93 %   02/18/23 2339 98.2 °F (36.8 °C) (!) 103 18 124/81 93 %   02/18/23 2023 -- -- -- -- 95 %   02/18/23 2015 -- -- -- 119/71 94 %   02/18/23 1716 98.7 °F (37.1 °C) (!) 120 16 (!) 115/91 96 %       Oxygen Therapy  SpO2: (!) 89 %  Pulse Oximetry Type: Intermittent  O2 Device: None (Room air)    Estimated body mass index is 26.4 kg/m² as calculated from the following:    Height as of this encounter: 6' (1.829 m). Weight as of this encounter: 194 lb 10.7 oz (88.3 kg). Intake/Output Summary (Last 24 hours) at 2/19/2023 1224  Last data filed at 2/19/2023 0045  Gross per 24 hour   Intake --   Output 600 ml   Net -600 ml         Physical Exam:   Blood pressure 114/72, pulse 82, temperature 98.1 °F (36.7 °C), temperature source Oral, resp. rate 18, height 6' (1.829 m), weight 194 lb 10.7 oz (88.3 kg), SpO2 (!) 89 %. General:    Mild distress  Head:  Normocephalic, atraumatic  Eyes:  Sclerae appear normal.  Pupils equally round. ENT:  Nares appear normal.  Moist oral mucosa  Neck:  No restricted ROM. Trachea midline   CV:   RRR. No m/r/g. Lungs: No wheezing. On room air. Abdomen:   Tender, nondistended. Extremities: No cyanosis or clubbing. Skin:     No rashes and normal coloration. Neuro:  CN II-XII grossly intact. Psych:  Normal mood and affect.       I have personally reviewed labs and tests:  Recent Labs:  Recent Results (from the past 48 hour(s))   CBC with Auto Differential    Collection Time: 02/18/23  5:31 PM   Result Value Ref Range    WBC 15.8 (H) 4.3 - 11.1 K/uL    RBC 5.36 4.23 - 5.6 M/uL    Hemoglobin 16.7 13.6 - 17.2 g/dL    Hematocrit 48.5 41.1 - 50.3 %    MCV 90.5 82 - 102 FL    MCH 31.2 26.1 - 32.9 PG    MCHC 34.4 31.4 - 35.0 g/dL RDW 12.3 11.9 - 14.6 %    Platelets 534 713 - 053 K/uL    MPV 10.0 9.4 - 12.3 FL    nRBC 0.00 0.0 - 0.2 K/uL    Differential Type AUTOMATED      Seg Neutrophils 83 (H) 43 - 78 %    Lymphocytes 9 (L) 13 - 44 %    Monocytes 7 4.0 - 12.0 %    Eosinophils % 0 (L) 0.5 - 7.8 %    Basophils 0 0.0 - 2.0 %    Immature Granulocytes 1 0.0 - 5.0 %    Segs Absolute 13.0 (H) 1.7 - 8.2 K/UL    Absolute Lymph # 1.4 0.5 - 4.6 K/UL    Absolute Mono # 1.2 0.1 - 1.3 K/UL    Absolute Eos # 0.1 0.0 - 0.8 K/UL    Basophils Absolute 0.1 0.0 - 0.2 K/UL    Absolute Immature Granulocyte 0.1 0.0 - 0.5 K/UL   CMP    Collection Time: 02/18/23  5:31 PM   Result Value Ref Range    Sodium 130 (L) 133 - 143 mmol/L    Potassium 3.7 3.5 - 5.1 mmol/L    Chloride 102 101 - 110 mmol/L    CO2 23 21 - 32 mmol/L    Anion Gap 5 2 - 11 mmol/L    Glucose 117 (H) 65 - 100 mg/dL    BUN 10 6 - 23 MG/DL    Creatinine 1.00 0.8 - 1.5 MG/DL    Est, Glom Filt Rate >60 >60 ml/min/1.73m2    Calcium 9.2 8.3 - 10.4 MG/DL    Total Bilirubin 1.4 (H) 0.2 - 1.1 MG/DL    ALT 42 12 - 65 U/L    AST 12 (L) 15 - 37 U/L    Alk Phosphatase 119 50 - 136 U/L    Total Protein 8.3 (H) 6.3 - 8.2 g/dL    Albumin 3.4 (L) 3.5 - 5.0 g/dL    Globulin 4.9 (H) 2.8 - 4.5 g/dL    Albumin/Globulin Ratio 0.7 0.4 - 1.6     Lactate, Sepsis    Collection Time: 02/18/23  5:31 PM   Result Value Ref Range    Lactic Acid, Sepsis 1.8 0.4 - 2.0 MMOL/L   Lipase    Collection Time: 02/18/23  5:31 PM   Result Value Ref Range    Lipase 1,088 (H) 73 - 393 U/L   Magnesium    Collection Time: 02/18/23  5:31 PM   Result Value Ref Range    Magnesium 1.8 1.8 - 2.4 mg/dL   Procalcitonin    Collection Time: 02/18/23  5:31 PM   Result Value Ref Range    Procalcitonin 0.31 0.00 - 0.49 ng/mL   Blood Culture 1    Collection Time: 02/18/23  9:59 PM    Specimen: Blood   Result Value Ref Range    Special Requests LEFT  Antecubital        Culture NO GROWTH AFTER 8 HOURS     Comprehensive Metabolic Panel w/ Reflex to MG Collection Time: 02/19/23  2:07 AM   Result Value Ref Range    Sodium 135 133 - 143 mmol/L    Potassium 3.7 3.5 - 5.1 mmol/L    Chloride 106 101 - 110 mmol/L    CO2 23 21 - 32 mmol/L    Anion Gap 6 2 - 11 mmol/L    Glucose 89 65 - 100 mg/dL    BUN 9 6 - 23 MG/DL    Creatinine 0.80 0.8 - 1.5 MG/DL    Est, Glom Filt Rate >60 >60 ml/min/1.73m2    Calcium 8.5 8.3 - 10.4 MG/DL    Total Bilirubin 1.4 (H) 0.2 - 1.1 MG/DL    ALT 15 12 - 65 U/L    AST 8 (L) 15 - 37 U/L    Alk Phosphatase 97 50 - 136 U/L    Total Protein 6.6 6.3 - 8.2 g/dL    Albumin 2.7 (L) 3.5 - 5.0 g/dL    Globulin 3.9 2.8 - 4.5 g/dL    Albumin/Globulin Ratio 0.7 0.4 - 1.6     CBC with Auto Differential    Collection Time: 02/19/23  2:07 AM   Result Value Ref Range    WBC 13.7 (H) 4.3 - 11.1 K/uL    RBC 4.65 4.23 - 5.6 M/uL    Hemoglobin 14.6 13.6 - 17.2 g/dL    Hematocrit 43.1 41.1 - 50.3 %    MCV 92.7 82 - 102 FL    MCH 31.4 26.1 - 32.9 PG    MCHC 33.9 31.4 - 35.0 g/dL    RDW 12.4 11.9 - 14.6 %    Platelets 135 235 - 254 K/uL    MPV 9.8 9.4 - 12.3 FL    nRBC 0.00 0.0 - 0.2 K/uL    Differential Type AUTOMATED      Seg Neutrophils 76 43 - 78 %    Lymphocytes 15 13 - 44 %    Monocytes 8 4.0 - 12.0 %    Eosinophils % 0 (L) 0.5 - 7.8 %    Basophils 0 0.0 - 2.0 %    Immature Granulocytes 1 0.0 - 5.0 %    Segs Absolute 10.4 (H) 1.7 - 8.2 K/UL    Absolute Lymph # 2.1 0.5 - 4.6 K/UL    Absolute Mono # 1.0 0.1 - 1.3 K/UL    Absolute Eos # 0.0 0.0 - 0.8 K/UL    Basophils Absolute 0.1 0.0 - 0.2 K/UL    Absolute Immature Granulocyte 0.1 0.0 - 0.5 K/UL   Lipase    Collection Time: 02/19/23  2:07 AM   Result Value Ref Range    Lipase 580 (H) 73 - 393 U/L       I have personally reviewed imaging studies:  Other Studies:  CT ABDOMEN PELVIS W IV CONTRAST Additional Contrast? None   Final Result   Addendum (preliminary) 2 of 2   ADDENDUM:       Case discussed with MD.       Axial image 29 appears to show fluid in the lumen of the gallbladder    communicating with fluid in the lumen of the colon. This is a subtle    finding;    artifact cannot be completely excluded. Coronal images 23 and 24 show extraluminal fluid density abutting the    superior    margin of the colon where it abuts the gallbladder. Nancy Aden M.D.    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 2. Biliary stent in good position. Nancy Aden M.D.    2/18/2023 8:58:00 PM          Current Meds:  Current Facility-Administered Medications   Medication Dose Route Frequency    0.9 % sodium chloride bolus  1,000 mL IntraVENous Once    sodium chloride flush 0.9 % injection 5-40 mL  5-40 mL IntraVENous 2 times per day    sodium chloride flush 0.9 % injection 5-40 mL  5-40 mL IntraVENous PRN    0.9 % sodium chloride infusion   IntraVENous PRN    ondansetron (ZOFRAN-ODT) disintegrating tablet 4 mg  4 mg Oral Q8H PRN    Or    ondansetron (ZOFRAN) injection 4 mg  4 mg IntraVENous Q6H PRN    polyethylene glycol (GLYCOLAX) packet 17 g  17 g Oral Daily PRN    acetaminophen (TYLENOL) tablet 650 mg  650 mg Oral Q6H PRN    Or    acetaminophen (TYLENOL) suppository 650 mg  650 mg Rectal Q6H PRN    0.9 % sodium chloride infusion   IntraVENous Continuous    piperacillin-tazobactam (ZOSYN) 3,375 mg in sodium chloride 0.9 % 50 mL IVPB (mini-bag)  3,375 mg IntraVENous q8h    morphine injection 2 mg  2 mg IntraVENous Q4H PRN    pantoprazole (PROTONIX) 40 mg in sodium chloride (PF) 0.9 % 10 mL injection  40 mg IntraVENous Daily       Signed:  IFRAH Smith - CNP    Part of this note may have been written by using a voice dictation software. The note has been proof read but may still contain some grammatical/other typographical errors.

## 2023-02-20 LAB
ALBUMIN SERPL-MCNC: 2.5 G/DL (ref 3.5–5)
ALBUMIN/GLOB SERPL: 0.6 (ref 0.4–1.6)
ALP SERPL-CCNC: 89 U/L (ref 50–136)
ALT SERPL-CCNC: 12 U/L (ref 12–65)
ANION GAP SERPL CALC-SCNC: 7 MMOL/L (ref 2–11)
AST SERPL-CCNC: 7 U/L (ref 15–37)
BILIRUB SERPL-MCNC: 1.2 MG/DL (ref 0.2–1.1)
BUN SERPL-MCNC: 8 MG/DL (ref 6–23)
CALCIUM SERPL-MCNC: 8.7 MG/DL (ref 8.3–10.4)
CHLORIDE SERPL-SCNC: 106 MMOL/L (ref 101–110)
CO2 SERPL-SCNC: 23 MMOL/L (ref 21–32)
CREAT SERPL-MCNC: 0.6 MG/DL (ref 0.8–1.5)
ERYTHROCYTE [DISTWIDTH] IN BLOOD BY AUTOMATED COUNT: 12.4 % (ref 11.9–14.6)
GLOBULIN SER CALC-MCNC: 4 G/DL (ref 2.8–4.5)
GLUCOSE SERPL-MCNC: 64 MG/DL (ref 65–100)
HCT VFR BLD AUTO: 39.9 % (ref 41.1–50.3)
HGB BLD-MCNC: 13.5 G/DL (ref 13.6–17.2)
MCH RBC QN AUTO: 31.4 PG (ref 26.1–32.9)
MCHC RBC AUTO-ENTMCNC: 33.8 G/DL (ref 31.4–35)
MCV RBC AUTO: 92.8 FL (ref 82–102)
NRBC # BLD: 0 K/UL (ref 0–0.2)
PLATELET # BLD AUTO: 206 K/UL (ref 150–450)
PMV BLD AUTO: 10.3 FL (ref 9.4–12.3)
POTASSIUM SERPL-SCNC: 3.5 MMOL/L (ref 3.5–5.1)
PROT SERPL-MCNC: 6.5 G/DL (ref 6.3–8.2)
RBC # BLD AUTO: 4.3 M/UL (ref 4.23–5.6)
SODIUM SERPL-SCNC: 136 MMOL/L (ref 133–143)
WBC # BLD AUTO: 9.1 K/UL (ref 4.3–11.1)

## 2023-02-20 PROCEDURE — 2580000003 HC RX 258: Performed by: INTERNAL MEDICINE

## 2023-02-20 PROCEDURE — 6360000002 HC RX W HCPCS: Performed by: INTERNAL MEDICINE

## 2023-02-20 PROCEDURE — A4216 STERILE WATER/SALINE, 10 ML: HCPCS | Performed by: INTERNAL MEDICINE

## 2023-02-20 PROCEDURE — 36415 COLL VENOUS BLD VENIPUNCTURE: CPT

## 2023-02-20 PROCEDURE — C9113 INJ PANTOPRAZOLE SODIUM, VIA: HCPCS | Performed by: INTERNAL MEDICINE

## 2023-02-20 PROCEDURE — 6360000002 HC RX W HCPCS: Performed by: NURSE PRACTITIONER

## 2023-02-20 PROCEDURE — 80053 COMPREHEN METABOLIC PANEL: CPT

## 2023-02-20 PROCEDURE — 1100000003 HC PRIVATE W/ TELEMETRY

## 2023-02-20 PROCEDURE — 85027 COMPLETE CBC AUTOMATED: CPT

## 2023-02-20 PROCEDURE — 99232 SBSQ HOSP IP/OBS MODERATE 35: CPT | Performed by: SURGERY

## 2023-02-20 PROCEDURE — 6360000002 HC RX W HCPCS: Performed by: STUDENT IN AN ORGANIZED HEALTH CARE EDUCATION/TRAINING PROGRAM

## 2023-02-20 PROCEDURE — 2500000003 HC RX 250 WO HCPCS: Performed by: SURGERY

## 2023-02-20 RX ORDER — DEXTROSE, SODIUM CHLORIDE, AND POTASSIUM CHLORIDE 5; .45; .15 G/100ML; G/100ML; G/100ML
INJECTION INTRAVENOUS CONTINUOUS
Status: DISPENSED | OUTPATIENT
Start: 2023-02-20 | End: 2023-02-21

## 2023-02-20 RX ADMIN — PIPERACILLIN AND TAZOBACTAM 3375 MG: 3; .375 INJECTION, POWDER, LYOPHILIZED, FOR SOLUTION INTRAVENOUS at 04:20

## 2023-02-20 RX ADMIN — MORPHINE SULFATE 4 MG: 4 INJECTION INTRAVENOUS at 00:35

## 2023-02-20 RX ADMIN — PIPERACILLIN AND TAZOBACTAM 3375 MG: 3; .375 INJECTION, POWDER, LYOPHILIZED, FOR SOLUTION INTRAVENOUS at 20:42

## 2023-02-20 RX ADMIN — PANTOPRAZOLE SODIUM 40 MG: 40 INJECTION, POWDER, LYOPHILIZED, FOR SOLUTION INTRAVENOUS at 08:21

## 2023-02-20 RX ADMIN — MORPHINE SULFATE 4 MG: 4 INJECTION INTRAVENOUS at 04:43

## 2023-02-20 RX ADMIN — PIPERACILLIN AND TAZOBACTAM 3375 MG: 3; .375 INJECTION, POWDER, LYOPHILIZED, FOR SOLUTION INTRAVENOUS at 12:01

## 2023-02-20 RX ADMIN — DEXTROSE MONOHYDRATE, SODIUM CHLORIDE, AND POTASSIUM CHLORIDE: 50; 4.5; 1.49 INJECTION, SOLUTION INTRAVENOUS at 12:01

## 2023-02-20 RX ADMIN — HYDROMORPHONE HYDROCHLORIDE 1 MG: 1 INJECTION, SOLUTION INTRAMUSCULAR; INTRAVENOUS; SUBCUTANEOUS at 20:55

## 2023-02-20 ASSESSMENT — PAIN DESCRIPTION - DESCRIPTORS
DESCRIPTORS: ACHING;CRAMPING;SORE;TENDER
DESCRIPTORS: ACHING;CRAMPING;SORE;TENDER
DESCRIPTORS: SHOOTING;SHARP;BURNING

## 2023-02-20 ASSESSMENT — PAIN - FUNCTIONAL ASSESSMENT: PAIN_FUNCTIONAL_ASSESSMENT: ACTIVITIES ARE NOT PREVENTED

## 2023-02-20 ASSESSMENT — PAIN DESCRIPTION - LOCATION
LOCATION: BACK
LOCATION: BACK
LOCATION: ABDOMEN;BACK

## 2023-02-20 ASSESSMENT — PAIN SCALES - GENERAL
PAINLEVEL_OUTOF10: 0
PAINLEVEL_OUTOF10: 7
PAINLEVEL_OUTOF10: 3
PAINLEVEL_OUTOF10: 7
PAINLEVEL_OUTOF10: 7

## 2023-02-20 ASSESSMENT — PAIN DESCRIPTION - ORIENTATION
ORIENTATION: RIGHT;LEFT;MID
ORIENTATION: MID;LOWER
ORIENTATION: RIGHT;LEFT;LOWER;MID;UPPER

## 2023-02-20 NOTE — PROGRESS NOTES
Hospitalist Progress Note   Admit Date:  2023  5:20 PM   Name:  Sunny Seals Age:  61 y.o. Sex:  male  :  1965   MRN:  [id number]   Room:      Presenting Complaint: Abdominal Pain     Reason(s) for Admission: Abdominal pain, epigastric [R10.13]  Cholecystitis [K81.9]  Sepsis ([address] [A41.9]  Acute biliary pancreatitis, unspecified complication status [J10.86]     Hospital Course:   Sunny Seals is a 61 y.o. CM with a PMH of tobacco use, recent GI complaints followed by GI s/p ERCP with biliary stent on 23, EUS w/ FNA 23 who presented due to abdominal pain. This started s/p ERCP and ongoing but worse after FNA. He was found to be septic. Lipase elevated. CT a/p indicated a possible choledochocolonic fistula. He was admitted to the hospitalist service for further work-up. GI and general surgery were consulted. Subjective & 24hr Events (23): The patient c/o an increase in abdominal pain this morning. No other complaints. Continue NPO. Trend a CMP and lipase in the morning. Cultures remain negative. BP well-controlled. GI and Gen surgery notes reviewed. 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 No    Diet:  Diet NPO  VTE prophylaxis: SCD's   Code status: Full Code    Hospital Problems:  Principal Problem:    Sepsis ([address]  Active Problems:    Biliary stricture    Abdominal pain    Hyponatremia    Tobacco use    Pancreatitis  Resolved Problems:    * No resolved hospital problems.  *      Objective:   Patient Vitals for the past 24 hrs:   Temp Pulse Resp BP SpO2   02/20/23 1104 97.7 °F (36.5 °C) 79 18 115/72 95 %   02/20/23 0658 98.6 °F (37 °C) 80 18 116/72 93 %   02/20/23 0513 -- -- 18 -- --   02/20/23 0443 -- -- 18 -- --   02/20/23 0418 98.1 °F (36.7 °C) 71 18 110/72 94 %   02/20/23 0105 -- -- 18 -- --   02/20/23 0035 -- -- 18 -- --   02/19/23 2337 98 °F (36.7 °C) 82 18 136/77 97 %   02/19/23 2108 -- -- 18 -- --   02/19/23 2038 -- -- 18 -- --   02/19/23 1945 98.4 °F (36.9 °C) 79 19 124/75 93 %   02/19/23 1556 98.4 °F (36.9 °C) 81 18 131/69 98 %         Oxygen Therapy  SpO2: 95 %  Pulse Oximetry Type: Intermittent  O2 Device: None (Room air)    Estimated body mass index is 26.4 kg/m² as calculated from the following:    Height as of this encounter: 6' (1.829 m). Weight as of this encounter: 194 lb 10.7 oz (88.3 kg). Intake/Output Summary (Last 24 hours) at 2/20/2023 1325  Last data filed at 2/20/2023 0758  Gross per 24 hour   Intake 2159 ml   Output 1300 ml   Net 859 ml           Physical Exam:   Blood pressure 115/72, pulse 79, temperature 97.7 °F (36.5 °C), temperature source Oral, resp. rate 18, height 6' (1.829 m), weight 194 lb 10.7 oz (88.3 kg), SpO2 95 %. General:    No cardiopulmonary distress  Head:  Normocephalic, atraumatic  Eyes:  Sclerae appear normal.  Pupils equally round. ENT:  Nares appear normal.  Moist oral mucosa  Neck:  No restricted ROM. Trachea midline   CV:   RRR. No m/r/g. Lungs: No wheezing. On room air. Abdomen:   Tender, nondistended. Extremities: No cyanosis or clubbing. Skin:     No rashes and normal coloration. Neuro:  CN II-XII grossly intact. Psych:  Normal mood and affect.       I have personally reviewed labs and tests:  Recent Labs:  Recent Results (from the past 48 hour(s))   CBC with Auto Differential    Collection Time: 02/18/23  5:31 PM   Result Value Ref Range    WBC 15.8 (H) 4.3 - 11.1 K/uL    RBC 5.36 4.23 - 5.6 M/uL    Hemoglobin 16.7 13.6 - 17.2 g/dL Hematocrit 48.5 41.1 - 50.3 %    MCV 90.5 82 - 102 FL    MCH 31.2 26.1 - 32.9 PG    MCHC 34.4 31.4 - 35.0 g/dL    RDW 12.3 11.9 - 14.6 %    Platelets 890 982 - 343 K/uL    MPV 10.0 9.4 - 12.3 FL    nRBC 0.00 0.0 - 0.2 K/uL    Differential Type AUTOMATED      Seg Neutrophils 83 (H) 43 - 78 %    Lymphocytes 9 (L) 13 - 44 %    Monocytes 7 4.0 - 12.0 %    Eosinophils % 0 (L) 0.5 - 7.8 %    Basophils 0 0.0 - 2.0 %    Immature Granulocytes 1 0.0 - 5.0 %    Segs Absolute 13.0 (H) 1.7 - 8.2 K/UL    Absolute Lymph # 1.4 0.5 - 4.6 K/UL    Absolute Mono # 1.2 0.1 - 1.3 K/UL    Absolute Eos # 0.1 0.0 - 0.8 K/UL    Basophils Absolute 0.1 0.0 - 0.2 K/UL    Absolute Immature Granulocyte 0.1 0.0 - 0.5 K/UL   CMP    Collection Time: 02/18/23  5:31 PM   Result Value Ref Range    Sodium 130 (L) 133 - 143 mmol/L    Potassium 3.7 3.5 - 5.1 mmol/L    Chloride 102 101 - 110 mmol/L    CO2 23 21 - 32 mmol/L    Anion Gap 5 2 - 11 mmol/L    Glucose 117 (H) 65 - 100 mg/dL    BUN 10 6 - 23 MG/DL    Creatinine 1.00 0.8 - 1.5 MG/DL    Est, Glom Filt Rate >60 >60 ml/min/1.73m2    Calcium 9.2 8.3 - 10.4 MG/DL    Total Bilirubin 1.4 (H) 0.2 - 1.1 MG/DL    ALT 42 12 - 65 U/L    AST 12 (L) 15 - 37 U/L    Alk Phosphatase 119 50 - 136 U/L    Total Protein 8.3 (H) 6.3 - 8.2 g/dL    Albumin 3.4 (L) 3.5 - 5.0 g/dL    Globulin 4.9 (H) 2.8 - 4.5 g/dL    Albumin/Globulin Ratio 0.7 0.4 - 1.6     Lactate, Sepsis    Collection Time: 02/18/23  5:31 PM   Result Value Ref Range    Lactic Acid, Sepsis 1.8 0.4 - 2.0 MMOL/L   Lipase    Collection Time: 02/18/23  5:31 PM   Result Value Ref Range    Lipase 1,088 (H) 73 - 393 U/L   Magnesium    Collection Time: 02/18/23  5:31 PM   Result Value Ref Range    Magnesium 1.8 1.8 - 2.4 mg/dL   Procalcitonin    Collection Time: 02/18/23  5:31 PM   Result Value Ref Range    Procalcitonin 0.31 0.00 - 0.49 ng/mL   Blood Culture 1    Collection Time: 02/18/23  9:59 PM    Specimen: Blood   Result Value Ref Range    Special Requests LEFT  Antecubital        Culture NO GROWTH 2 DAYS     EKG 12 Lead    Collection Time: 02/19/23 12:31 AM   Result Value Ref Range    Ventricular Rate 86 BPM    Atrial Rate 86 BPM    P-R Interval 172 ms    QRS Duration 96 ms    Q-T Interval 344 ms    QTc Calculation (Bazett) 411 ms    P Axis 78 degrees    R Axis -20 degrees    T Axis 60 degrees    Diagnosis Normal sinus rhythm    Comprehensive Metabolic Panel w/ Reflex to MG    Collection Time: 02/19/23  2:07 AM   Result Value Ref Range    Sodium 135 133 - 143 mmol/L    Potassium 3.7 3.5 - 5.1 mmol/L    Chloride 106 101 - 110 mmol/L    CO2 23 21 - 32 mmol/L    Anion Gap 6 2 - 11 mmol/L    Glucose 89 65 - 100 mg/dL    BUN 9 6 - 23 MG/DL    Creatinine 0.80 0.8 - 1.5 MG/DL    Est, Glom Filt Rate >60 >60 ml/min/1.73m2    Calcium 8.5 8.3 - 10.4 MG/DL    Total Bilirubin 1.4 (H) 0.2 - 1.1 MG/DL    ALT 15 12 - 65 U/L    AST 8 (L) 15 - 37 U/L    Alk Phosphatase 97 50 - 136 U/L    Total Protein 6.6 6.3 - 8.2 g/dL    Albumin 2.7 (L) 3.5 - 5.0 g/dL    Globulin 3.9 2.8 - 4.5 g/dL    Albumin/Globulin Ratio 0.7 0.4 - 1.6     CBC with Auto Differential    Collection Time: 02/19/23  2:07 AM   Result Value Ref Range    WBC 13.7 (H) 4.3 - 11.1 K/uL    RBC 4.65 4.23 - 5.6 M/uL    Hemoglobin 14.6 13.6 - 17.2 g/dL    Hematocrit 43.1 41.1 - 50.3 %    MCV 92.7 82 - 102 FL    MCH 31.4 26.1 - 32.9 PG    MCHC 33.9 31.4 - 35.0 g/dL    RDW 12.4 11.9 - 14.6 %    Platelets 468 778 - 249 K/uL    MPV 9.8 9.4 - 12.3 FL    nRBC 0.00 0.0 - 0.2 K/uL    Differential Type AUTOMATED      Seg Neutrophils 76 43 - 78 %    Lymphocytes 15 13 - 44 %    Monocytes 8 4.0 - 12.0 %    Eosinophils % 0 (L) 0.5 - 7.8 %    Basophils 0 0.0 - 2.0 %    Immature Granulocytes 1 0.0 - 5.0 %    Segs Absolute 10.4 (H) 1.7 - 8.2 K/UL    Absolute Lymph # 2.1 0.5 - 4.6 K/UL    Absolute Mono # 1.0 0.1 - 1.3 K/UL    Absolute Eos # 0.0 0.0 - 0.8 K/UL    Basophils Absolute 0.1 0.0 - 0.2 K/UL    Absolute Immature Granulocyte 0.1 0.0 - 0.5 K/UL   Lipase    Collection Time: 02/19/23  2:07 AM   Result Value Ref Range    Lipase 580 (H) 73 - 393 U/L   Culture, Blood 1    Collection Time: 02/19/23  2:12 AM    Specimen: Blood   Result Value Ref Range    Special Requests RIGHT  ARM        Culture NO GROWTH 1 DAY     Comprehensive Metabolic Panel    Collection Time: 02/20/23  6:46 AM   Result Value Ref Range    Sodium 136 133 - 143 mmol/L    Potassium 3.5 3.5 - 5.1 mmol/L    Chloride 106 101 - 110 mmol/L    CO2 23 21 - 32 mmol/L    Anion Gap 7 2 - 11 mmol/L    Glucose 64 (L) 65 - 100 mg/dL    BUN 8 6 - 23 MG/DL    Creatinine 0.60 (L) 0.8 - 1.5 MG/DL    Est, Glom Filt Rate >60 >60 ml/min/1.73m2    Calcium 8.7 8.3 - 10.4 MG/DL    Total Bilirubin 1.2 (H) 0.2 - 1.1 MG/DL    ALT 12 12 - 65 U/L    AST 7 (L) 15 - 37 U/L    Alk Phosphatase 89 50 - 136 U/L    Total Protein 6.5 6.3 - 8.2 g/dL    Albumin 2.5 (L) 3.5 - 5.0 g/dL    Globulin 4.0 2.8 - 4.5 g/dL    Albumin/Globulin Ratio 0.6 0.4 - 1.6     CBC    Collection Time: 02/20/23  6:46 AM   Result Value Ref Range    WBC 9.1 4.3 - 11.1 K/uL    RBC 4.30 4.23 - 5.6 M/uL    Hemoglobin 13.5 (L) 13.6 - 17.2 g/dL    Hematocrit 39.9 (L) 41.1 - 50.3 %    MCV 92.8 82 - 102 FL    MCH 31.4 26.1 - 32.9 PG    MCHC 33.8 31.4 - 35.0 g/dL    RDW 12.4 11.9 - 14.6 %    Platelets 788 050 - 231 K/uL    MPV 10.3 9.4 - 12.3 FL    nRBC 0.00 0.0 - 0.2 K/uL       I have personally reviewed imaging studies:  Other Studies:  CT ABDOMEN PELVIS W IV CONTRAST Additional Contrast? None   Final Result   Addendum (preliminary) 2 of 2   ADDENDUM:       Case discussed with MD.       Axial image 29 appears to show fluid in the lumen of the gallbladder    communicating with fluid in the lumen of the colon. This is a subtle    finding;    artifact cannot be completely excluded. Coronal images 23 and 24 show extraluminal fluid density abutting the    superior    margin of the colon where it abuts the gallbladder.        Mechele Schwab, M.D. 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 2. Biliary stent in good position. Jarocho Hitlon M.D.    2/18/2023 8:58:00 PM          Current Meds:  Current Facility-Administered Medications   Medication Dose Route Frequency    HYDROmorphone (DILAUDID) injection 1 mg  1 mg IntraVENous Q4H PRN    HYDROmorphone (DILAUDID) injection 0.5 mg  0.5 mg IntraVENous Q4H PRN    dextrose 5 % and 0.45 % NaCl with KCl 20 mEq infusion   IntraVENous Continuous    sodium chloride flush 0.9 % injection 5-40 mL  5-40 mL IntraVENous PRN    0.9 % sodium chloride infusion   IntraVENous PRN    ondansetron (ZOFRAN-ODT) disintegrating tablet 4 mg  4 mg Oral Q8H PRN    Or    ondansetron (ZOFRAN) injection 4 mg  4 mg IntraVENous Q6H PRN    acetaminophen (TYLENOL) tablet 650 mg  650 mg Oral Q6H PRN    piperacillin-tazobactam (ZOSYN) 3,375 mg in sodium chloride 0.9 % 50 mL IVPB (mini-bag)  3,375 mg IntraVENous q8h    pantoprazole (PROTONIX) 40 mg in sodium chloride (PF) 0.9 % 10 mL injection  40 mg IntraVENous Daily       Signed:  IFRAH Schofield - CNP    Part of this note may have been written by using a voice dictation software. The note has been proof read but may still contain some grammatical/other typographical errors.

## 2023-02-20 NOTE — CARE COORDINATION
ASSESSMENT NOTE    Attending Physician: Steff Miranda, DO  Admit Problem: Abdominal pain, epigastric [R10.13]  Cholecystitis [K81.9]  Sepsis ([facility] [A41.9]  Acute biliary pancreatitis, unspecified complication status [N42.61]  Date/Time of Admission: 2/18/2023  5:20 PM  Problem List:  Patient Active Problem List   Diagnosis    Sepsis ([facility]    Biliary stricture    Abdominal pain    Hyponatremia    Tobacco use    Pancreatitis       Service Assessment  Patient Orientation Alert and Oriented   Cognition Alert   History Provided By Patient   Primary Caregiver Self   Accompanied By/Relationship     Support Systems Spouse/Significant Other, Parent, Children, Family Members   Patient's Healthcare Decision Maker is: Legal Next of [address]   PCP Verified by CM Yes (Dr Lopez [facility]  [phone]  and uses [facility] in [place])   Last Visit to PCP Within last 6 months   Prior Functional Level Independent in ADLs/IADLs   Current Functional Level Independent in ADLs/IADLs   Can patient return to prior living arrangement Yes   Ability to make needs known: Good   Family able to assist with home care needs: Yes   Would you like for me to discuss the discharge plan with any other family members/significant others, and if so, who?  Yes   Financial Resources     Community Resources     CM/SW Referral       Social/Functional History  Lives With Spouse   Type of [address] One level   Home Access Level entry   Entrance Stairs - Number of Steps     Entrance Stairs - Rails     Bathroom Shower/Tub     Bathroom Toilet     Bathroom Equipment     Bathroom Accessibility     Home Equipment     Receives Help From [address]     Dressing     Grooming     Feeding     Toileting     [address] Work     Driving     Shopping          Other ([place])     Homemaking Responsibilities     Meal Prep Responsibility     Laundry Responsibility     Cleaning Responsibility     Bill Paying/Finance Responsibility     Shopping Responsibility     [address] Management     Other (Comment)     Ambulation Assistance     Transfer Assistance     Active  Yes   Patient's  Info     Mode of Transportation Car   Education     Occupation Full time employment   Type of Occupation       Discharge Planning   Type of [address] Spouse/Significant Other, Parent, Children, Family Members   Current Services Prior To Admission None   Potential Assistance Needed N/A   DME     DME     DME Ordered? No   Potential Assistance Purchasing Medications No   Meds-to-Beds: Does the patient want to have any new prescriptions delivered to bedside prior to discharge? Type of Home Care Services None   Patient expects to be discharged to: House   Follow Up Appointment: Best Day/Time     One/Two Story Residence:     # of Interior Steps     Height of Each Step (in)     [employer] Available     History of Falls? Services At/After Discharge  Transition of Care Consult (CM Consult): Discharge Planning   Internal Home Health     Internal Hospice     Reason Outside Agency [address]     Partner SNF     Reason Why Partner SNF Not Chosen     Internal Comfort Care     Reason Outside [address] Discharge None   [place] Resource Information Provided? No   Mode of Transport at Discharge [facility] Time of Discharge     Confirm Follow Up Transport Self     Condition of Participation: Discharge Planning  The plan for Transition of Care is related to the following treatment goals: return to baseline with family support   The Patient and/or Patient Representative was provided with a Choice of Provider?  Patient   Name of the Patient Representative who was provided with the Choice of Provider and agrees with the Discharge Plan? The Patient and/or Patient Representative Agree with the Discharge Plan? Yes   Freedom of Choice list was provided with basic dialogue that supports the individualized plan of care/goals, treatment preferences, and shares the quality data associated with the providers?  Yes     Documentation for Discharge Appeal  Discharge Appealed by     Date notified by [facility]IO of appeal request:     Time notified by QIO of appeal request:     Detailed Notice of Discharge given to:     Date Notice of Discharge given:     Time Notice of Discharge given:     Date records sent to [address] [place] [address]     Time records sent to [address] [place] [address]     Date Notified of Outcome     Time Notified of Outcome     Outcome of appeal           Tilford Gitelman, RN 02/20/23 12:09 PM

## 2023-02-20 NOTE — PROGRESS NOTES
H&P/Consult Note/Progress Note/Office Note:   Seema Talavera. MRN: [id number]  :1965  Age:57 y.o.    HPI: Seema Talavera. is a 61 y.o. male who was admitted by the hospitalist on 23 after he presented with epigastric pain. He reported severe nausea beginning in 2023 with p.o. intake  This was associated with upper abdominal discomfort and dark urine  He was prescribed Protonix by his primary care at first which did not help  He then had US below and was referred to GI    23 RUQ abd US (Formerly [facility])  Liver: Intrahepatic biliary duct dilatation is present. No focal liver lesions visualized. Gallbladder: The gallbladder is distended and contains sludge. No pericholecystic fluid. No sonographic Pop sign. Common bile duct: Dilated to 10 mm. Pancreas: The body the pancreas is seen and there is pancreatic duct dilatation to 4 mm in the body the pancreas. The head and tail the pancreas are not adequately visualized by ultrasound. Right kidney: Normal.     IMPRESSION:     Common duct dilatation, intrahepatic biliary duct dilatation, and gallbladder distention likely represents biliary obstruction. Superimposed pancreatic duct dilatation is also present. This constellation of findings can be seen in the setting of a pancreatic head mass. The head of the pancreas is not adequately seen on today's exam.   I would recommend pancreatic mass protocol CT and gastroenterology consultation. 23 ERCP with stent; Dr Kary Gomez:  Biliary stricture just proximal to ampulla. Could visualize this area post papillotomy. Sludge and cloudy bile extracted. Able to pass 12 mm balloon with resistance. Good drainage with stent. Pancreas- not able to cannulate. GB- ? Sludge vs small stones  Biggest concern would a small pancreatic head tumor          23 EUS; Dr Momin Square:   1.  Extensive pancreatic parenchymal abnormalities are consistent with chronic pancreatitis based on EUS criteria. It should be noted that the presence of chronic pancreatitis decreases the sensitivity of EUS for detection of pancreatic tumors. 2. 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 FNA was performed. 3. Pancreatic cyst. Morphologic features are most typical for pseudocyst. This is currently too small to warrant FNA. Soon after the EUS that he developed worsening abdominal pain and came to the ER. He denies prior abdominal surgery. He is not taking blood thinners. In Er on 2/19/23 wbc 15.8k, hgb 16.7, plt 306k  T bili 1.4, AST/ALT 12/42, alk [id number]  Lipase 1088  Gen Surgery was consulted after the below CT       2/18/23 CT abd/pelvis with IV contrast    Hx: upper abd pain s/p ERCP     Lower Chest: Scattered atelectasis in the lung bases. Liver: Normal.     Gallbladder/Billary: The gallbladder is inflamed. 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 A biliary stent is in good position. Pancreas: Normal.     Spleen: Normal.     Adrenal Glands: Normal.     Kidneys: Normal.     GI Tract: The stomach and duodenum are unremarkable. Normal appendix. Normal small bowel. Mesentery/Peritoneum: Hazy edema and inflammation in the upper central mesentery. No free intraperitoneal air. Vasculature: Normal.     Lymph Nodes: Normal.     Abdominal Wall: See musculoskeletal section. Bladder: Normal.     Reproductive: Normal.     Musculoskeletal: Normal.     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    2. Biliary stent in good position                 Past Medical History:   Diagnosis Date    GERD (gastroesophageal reflux disease)     medication    Sleep apnea     has a CPAP but does not use     Past Surgical History:   Procedure Laterality Date    COLONOSCOPY      ERCP N/A 2/1/2023    ERCP SPHINCTER/PAPILLOTOMY/BALLOON SWEEP/STENT PLACEMENT performed by MAX Camilla Davila MD at [facility] ENDOSCOPY    ERCP W/ PLASTIC STENT PLACEMENT  02/01/2023    SINUS SURGERY      x3    VASECTOMY       Current Facility-Administered Medications   Medication Dose Route Frequency    HYDROmorphone (DILAUDID) injection 1 mg  1 mg IntraVENous Q4H PRN    HYDROmorphone (DILAUDID) injection 0.5 mg  0.5 mg IntraVENous Q4H PRN    0.9 % sodium chloride bolus  1,000 mL IntraVENous Once    sodium chloride flush 0.9 % injection 5-40 mL  5-40 mL IntraVENous 2 times per day    sodium chloride flush 0.9 % injection 5-40 mL  5-40 mL IntraVENous PRN    0.9 % sodium chloride infusion   IntraVENous PRN    ondansetron (ZOFRAN-ODT) disintegrating tablet 4 mg  4 mg Oral Q8H PRN    Or    ondansetron (ZOFRAN) injection 4 mg  4 mg IntraVENous Q6H PRN    acetaminophen (TYLENOL) tablet 650 mg  650 mg Oral Q6H PRN    Or    acetaminophen (TYLENOL) suppository 650 mg  650 mg Rectal Q6H PRN    0.9 % sodium chloride infusion   IntraVENous Continuous    piperacillin-tazobactam (ZOSYN) 3,375 mg in sodium chloride 0.9 % 50 mL IVPB (mini-bag)  3,375 mg IntraVENous q8h    pantoprazole (PROTONIX) 40 mg in sodium chloride (PF) 0.9 % 10 mL injection  40 mg IntraVENous Daily     ALLERGIES:  Patient has no known allergies. Social History     Socioeconomic History    Marital status:    Tobacco Use    Smoking status: Every Day     Packs/day: 0.50     Years: 40.00     Pack years: 20.00     Types: Cigarettes    Smokeless tobacco: Never   Vaping Use    Vaping Use: Never used   Substance and Sexual Activity    Alcohol use: Not Currently    Drug use: Not Currently     Social History     Tobacco Use   Smoking Status Every Day    Packs/day: 0.50    Years: 40.00    Pack years: 20.00    Types: Cigarettes   Smokeless Tobacco Never     No family history on file. ROS: The patient has no difficulty with chest pain or shortness of breath. No fever or chills.   Comprehensive review of systems was otherwise unremarkable except as noted above.    Physical Exam:   /72   Pulse 79   Temp 97.7 °F (36.5 °C) (Oral)   Resp 18   Ht 6' (1.829 m)   Wt 194 lb 10.7 oz (88.3 kg)   SpO2 95%   BMI 26.40 kg/m²   Vitals:    02/20/23 0443 02/20/23 0513 02/20/23 0658 02/20/23 1104   BP:   116/72 115/72   Pulse:   80 79   Resp: 18 18 18 18   Temp:   98.6 °F (37 °C) 97.7 °F (36.5 °C)   TempSrc:   Oral Oral   SpO2:   93% 95%   Weight:       Height:         No intake/output data recorded. 02/18 1901 - 02/20 0700  In: 2159 [I.V.:2159]  Out: 1900 [Urine:1900]    Constitutional: Alert, oriented, cooperative patient in no acute distress; appears stated age    Eyes:Sclera are clear. EOMs intact  ENMT: no external lesions gross hearing normal; no obvious neck masses, no ear or lip lesions, nares normal  CV: RRR. Normal perfusion  Resp: No JVD. Breathing is  non-labored; no audible wheezing. GI: soft and non-distended, epigastric pain is improving       Musculoskeletal: unremarkable with normal function. No embolic signs or cyanosis.    Neuro:  Oriented; moves all 4; no focal deficits  Psychiatric: normal affect and mood, no memory impairment    Recent vitals (if inpt):  Patient Vitals for the past 24 hrs:   BP Temp Temp src Pulse Resp SpO2 Height   02/20/23 1104 115/72 97.7 °F (36.5 °C) Oral 79 18 95 % --   02/20/23 0658 116/72 98.6 °F (37 °C) Oral 80 18 93 % --   02/20/23 0513 -- -- -- -- 18 -- --   02/20/23 0443 -- -- -- -- 18 -- --   02/20/23 0418 110/72 98.1 °F (36.7 °C) Oral 71 18 94 % --   02/20/23 0105 -- -- -- -- 18 -- --   02/20/23 0035 -- -- -- -- 18 -- --   02/19/23 2337 136/77 98 °F (36.7 °C) Oral 82 18 97 % --   02/19/23 2108 -- -- -- -- 18 -- --   02/19/23 2038 -- -- -- -- 18 -- --   02/19/23 1945 124/75 98.4 °F (36.9 °C) Oral 79 19 93 % --   02/19/23 1556 131/69 98.4 °F (36.9 °C) Oral 81 18 98 % --   02/19/23 1329 -- -- -- -- -- -- 6' (1.829 m)       Amount and/or Complexity of Data Reviewed and Analyzed:  I reviewed and analyzed all of the unique labs and radiologic studies that are shown below as well as any that are in the HPI, and any that are in the expanded problem list below  *Each unique test, order, or document contributes to the combination of 2 or combination of 3 in Category 1 below. For this visit I also reviewed old records and prior notes. Recent Labs     02/20/23  0646   WBC 9.1   HGB 13.5*         K 3.5      CO2 23   BUN 8   ALT 12     Review of most recent CBC  Lab Results   Component Value Date    WBC 9.1 02/20/2023    HGB 13.5 (L) 02/20/2023    HCT 39.9 (L) 02/20/2023    MCV 92.8 02/20/2023     02/20/2023       Review of most recent BMP  Lab Results   Component Value Date/Time     02/20/2023 06:46 AM    K 3.5 02/20/2023 06:46 AM     02/20/2023 06:46 AM    CO2 23 02/20/2023 06:46 AM    BUN 8 02/20/2023 06:46 AM    CREATININE 0.60 02/20/2023 06:46 AM    GLUCOSE 64 02/20/2023 06:46 AM    CALCIUM 8.7 02/20/2023 06:46 AM        Review of most recent LFTs (and lipase if done)  Lab Results   Component Value Date    ALT 12 02/20/2023    AST 7 (L) 02/20/2023    ALKPHOS 89 02/20/2023    BILITOT 1.2 (H) 02/20/2023     Lab Results   Component Value Date    LIPASE 580 (H) 02/19/2023       No results found for: INR, APTT, LCAD    Review of most recent HgbA1c  No results found for: LABA1C    Nutritional assessment screen for wound healing issues:  Lab Results   Component Value Date    LABALBU 2.5 (L) 02/20/2023       @lastcovr@    Xray Result (most recent):  No results found for this or any previous visit from the past 3650 days. CT Result (most recent):  CT ABDOMEN PELVIS W IV CONTRAST 02/18/2023    Addendum 2/18/2023 10:25 PM  ADDENDUM:    Case discussed with MD.    Axial image 29 appears to show fluid in the lumen of the gallbladder  communicating with fluid in the lumen of the colon. This is a subtle finding;  artifact cannot be completely excluded.     Coronal images 23 and 24 show extraluminal fluid density abutting the superior  margin of the colon where it abuts the gallbladder. Franco Thornton M.D.  2/18/2023 10:25:00 PM    Addendum 2/18/2023  9:07 PM  ADDENDUM:    Ric[address]    Franco Thornton M.D.  2/18/2023 9:07:00 PM    Narrative  EXAMINATION: CT SCAN OF THE ABDOMEN AND PELVIS WITH INTRAVENOUS CONTRAST    DATE OF EXAM: 2/18/2023 5:15 PM    HISTORY: upper abd pain s/p ERCP    COMPARISON: None. TECHNIQUE: CT examination of the abdomen and pelvis was performed following the  intravenous administration of 100 mL of Isovue-370. CT dose lowering techniques  were used, to include: automated exposure control, adjustment for patient size,  and/or use of iterative reconstruction. FINDINGS:    ABDOMEN/PELVIS:    Lower Chest: Scattered atelectasis in the lung bases. Liver: Normal.    Gallbladder/Billary: The gallbladder is inflamed. 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 A biliary  stent is in good position. Pancreas: Normal.    Spleen: Normal.    Adrenal Glands: Normal.    Kidneys: Normal.    GI Tract: The stomach and duodenum are unremarkable. Normal appendix. Normal  small bowel. Mesentery/Peritoneum: Hazy edema and inflammation in the upper central  mesentery. No free intraperitoneal air. Vasculature: Normal.    Lymph Nodes: Normal.    Abdominal Wall: See musculoskeletal section. Bladder: Normal.    Reproductive: Normal.    Musculoskeletal: Normal.    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 2. Biliary stent in good position. Franco Thornton M.D.  2/18/2023 8:58:00 PM    US Result (most recent):  No results found for this or any previous visit from the past 3650 days.       Admission date (for inpatients): 2/18/2023   * No surgery found *  * No surgery found *        ASSESSMENT/PLAN:  [unfilled]  Principal Problem:    Sepsis ([address]  Active Problems:    Biliary stricture    Abdominal pain Hyponatremia    Tobacco use    Pancreatitis  Resolved Problems:    * No resolved hospital problems. *     Patient Active Problem List    Diagnosis Date Noted    Sepsis ([facility] [address] 02/18/2023     Priority: Medium    Biliary stricture 02/18/2023     Priority: Medium    Abdominal pain 02/18/2023     Priority: Medium    Hyponatremia 02/18/2023     Priority: Medium    Tobacco use 02/18/2023     Priority: Medium    Pancreatitis 02/18/2023     Priority: Medium          Number and Complexity of Problems addressed and   Risks of complications and/or morbidity of management            Acute Pancreatitis after EUS and FNA of pancreas  Continue NPO  IVF  Morphine ordered for pain  FNA biopsy showed no evidence of malignancy  Etiology of distal CBD stricture most likely multifactorial recurrent pancreatitis (alcohol, gallstones/CBD stone)        Abnormal GB on CT  Possible cholecysto-colic fistula vs air in GB after ERCP and stent with surrrounding pancreatic edema  It appears from the ERCP images that the cystic duct was patent at that time  IV Zosyn  Follow  No plans for surgery at this time  Await resolution of acute pancreatitis as first step                Level of MDM (2/3 elements below)  Number and Complexity of Problems Addressed Amount and/or Complexity of Data to be Reviewed and Analyzed  *Each unique test, order, or document contributes to the combination of 2 or combination of 3 in Category 1 below. Risk of Complications and/or Morbidity or Mortality of pt Management     91762  [id number] SF Minimal  ?1self-limited or minor problem Minimal or none Minimal risk of morbidity from additional diagnostic testing or Rx   91894  18282 Low Low  ? 2or more self-limited or minor problems;    or  ? 1stable chronic illness;    or  ?1acute, uncomplicated illness or injury   Limited  (Must meet the requirements of at least 1 of the 2 categories)  Category 1: Tests and documents   ? Any combination of 2 from the following:  ?Review of prior external note(s) from each unique source*;  ?review of the result(s) of each unique test*;   ?ordering of each unique test*    or   Category 2: Assessment requiring an independent historian(s)  (For the categories of independent interpretation of tests and discussion of management or test interpretation, see moderate or high) Low risk of morbidity from additional diagnostic testing or treatment     13000  95051 Mod Moderate  ? 1or more chronic illnesses with exacerbation, progression, or side effects of treatment;    or  ?2or more stable chronic illnesses;    or  ?1undiagnosed new problem with uncertain prognosis;    or  ?1acute illness with systemic symptoms;    or  ?1acute complicated injury   Moderate  (Must meet the requirements of at least 1 out of 3 categories)  Category 1: Tests, documents, or independent historian(s)  ? Any combination of 3 from the following:   ?Review of prior external note(s) from each unique source*;  ?Review of the result(s) of each unique test*;  ?Ordering of each unique test*;  ?Assessment requiring an independent historian(s)    or  Category 2: Independent interpretation of tests   ? Independent interpretation of a test performed by another physician/other qualified health care professional (not separately reported);     or  Category 3: Discussion of management or test interpretation  ? Discussion of management or test interpretation with external physician/other qualified health care professional/appropriate source (not separately reported)   Moderate risk of morbidity from additional diagnostic testing or treatment  Examples only:  ?Prescription drug management   ? Decision regarding minor surgery with identified patient or procedure risk factors  ? Decision regarding elective major surgery without identified patient or procedure risk factors   ? Diagnosis or treatment significantly limited by social determinants of health       49561  21854 High High  ? 1or more chronic illnesses with severe exacerbation, progression, or side effects of treatment;    or  ?1 acute or chronic illness or injury that poses a threat to life or bodily function   Extensive  (Must meet the requirements of at least 2 out of 3 categories)  Category 1: Tests, documents, or independent historian(s)  ? Any combination of 3 from the following:   ?Review of prior external note(s) from each unique source*;  ?Review of the result(s) of each unique test*;   ?Ordering of each unique test*;   ?Assessment requiring an independent historian(s)    or   Category 2: Independent interpretation of tests   ? Independent interpretation of a test performed by another physician/other qualified health care professional (not separately reported);     or  Category 3: Discussion of management or test interpretation  ? Discussion of management or test interpretation with external physician/other qualified health care professional/appropriate source (not separately reported)   High risk of morbidity from additional diagnostic testing or treatment  Examples only:  ?Drug therapy requiring intensive monitoring for toxicity  ? Decision regarding elective major surgery with identified patient or procedure risk factors  ? Decision regarding emergency major surgery  ? Decision regarding hospitalization  ? Decision not to resuscitate or to de-escalate care because of poor prognosis      Parenteral controlled substances             I have personally performed a face-to-face diagnostic evaluation and management  service on this patient. I have independently seen the patient. I have independently obtained the above history from the patient/family. I have independently examined the patient with above findings. I have independently reviewed data/labs for this patient and developed the above plan of care (MDM).       Signed: Carli Hathc MD  2/20/2023    11:35 AM

## 2023-02-20 NOTE — CONSULTS
H&P/Consult Note/Progress Note/Office Note:   Nieves Thakur MRN: [id number]  :1965  Age:57 y.o.    HPI: Nieves Thakur is a 61 y.o. male who was admitted by the hospitalist on 23 after he presented with epigastric pain. He reported severe nausea beginning in 2023 with p.o. intake  This was associated with upper abdominal discomfort and dark urine  He was prescribed Protonix by his primary care at first which did not help  He then had US below and was referred to GI    23 RUQ [facility] US ([facility])  Liver: Intrahepatic biliary duct dilatation is present. No focal liver lesions visualized. Gallbladder: The gallbladder is distended and contains sludge. No pericholecystic fluid. No sonographic Pop sign. Common bile duct: Dilated to 10 mm. Pancreas: The body the pancreas is seen and there is pancreatic duct dilatation to 4 mm in the body the pancreas. The head and tail the pancreas are not adequately visualized by ultrasound. Right kidney: Normal.     IMPRESSION:     Common duct dilatation, intrahepatic biliary duct dilatation, and gallbladder distention likely represents biliary obstruction. Superimposed pancreatic duct dilatation is also present. This constellation of findings can be seen in the setting of a pancreatic head mass. The head of the pancreas is not adequately seen on today's exam.   I would recommend pancreatic mass protocol CT and gastroenterology consultation. 23 ERCP with stent; Dr Denita Joyce:  Biliary stricture just proximal to ampulla. Could visualize this area post papillotomy. Sludge and cloudy bile extracted. Able to pass 12 mm balloon with resistance. Good drainage with stent. Pancreas- not able to cannulate. GB- ? Sludge vs small stones  Biggest concern would a small pancreatic head tumor          23 EUS; Dr Joaquin Hein:   1.  Extensive pancreatic parenchymal abnormalities are consistent with chronic pancreatitis based on EUS criteria. It should be noted that the presence of chronic pancreatitis decreases the sensitivity of EUS for detection of pancreatic tumors. 2. 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 FNA was performed. 3. Pancreatic cyst. Morphologic features are most typical for pseudocyst. This is currently too small to warrant FNA. Soon after the EUS that he developed worsening abdominal pain and came to the ER. He denies prior abdominal surgery. He is not taking blood thinners. In Er on 2/19/23 wbc 15.8k, hgb 16.7, plt 306k  T bili 1.4, AST/ALT 12/42, alk [id number]  Lipase 1088  Gen Surgery was consulted after the below CT       2/18/23 CT abd/pelvis with IV contrast    Hx: upper abd pain s/p ERCP     Lower Chest: Scattered atelectasis in the lung bases. Liver: Normal.     Gallbladder/Billary: The gallbladder is inflamed. 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 A biliary stent is in good position. Pancreas: Normal.     Spleen: Normal.     Adrenal Glands: Normal.     Kidneys: Normal.     GI Tract: The stomach and duodenum are unremarkable. Normal appendix. Normal small bowel. Mesentery/Peritoneum: Hazy edema and inflammation in the upper central mesentery. No free intraperitoneal air. Vasculature: Normal.     Lymph Nodes: Normal.     Abdominal Wall: See musculoskeletal section. Bladder: Normal.     Reproductive: Normal.     Musculoskeletal: Normal.     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    2. Biliary stent in good position         Past Medical History:   Diagnosis Date    GERD (gastroesophageal reflux disease)     medication    Sleep apnea     has a CPAP but does not use     Past Surgical History:   Procedure Laterality Date    COLONOSCOPY      ERCP N/A 2/1/2023    ERCP SPHINCTER/PAPILLOTOMY/BALLOON SWEEP/STENT PLACEMENT performed by Ross Lima Claudia Crigler, MD at [facility] ENDOSCOPY    ERCP W/ PLASTIC STENT PLACEMENT  02/01/2023    SINUS SURGERY      x3    VASECTOMY       Current Facility-Administered Medications   Medication Dose Route Frequency    morphine injection 4 mg  4 mg IntraVENous Q4H PRN    0.9 % sodium chloride bolus  1,000 mL IntraVENous Once    sodium chloride flush 0.9 % injection 5-40 mL  5-40 mL IntraVENous 2 times per day    sodium chloride flush 0.9 % injection 5-40 mL  5-40 mL IntraVENous PRN    0.9 % sodium chloride infusion   IntraVENous PRN    ondansetron (ZOFRAN-ODT) disintegrating tablet 4 mg  4 mg Oral Q8H PRN    Or    ondansetron (ZOFRAN) injection 4 mg  4 mg IntraVENous Q6H PRN    acetaminophen (TYLENOL) tablet 650 mg  650 mg Oral Q6H PRN    Or    acetaminophen (TYLENOL) suppository 650 mg  650 mg Rectal Q6H PRN    0.9 % sodium chloride infusion   IntraVENous Continuous    piperacillin-tazobactam (ZOSYN) 3,375 mg in sodium chloride 0.9 % 50 mL IVPB (mini-bag)  3,375 mg IntraVENous q8h    pantoprazole (PROTONIX) 40 mg in sodium chloride (PF) 0.9 % 10 mL injection  40 mg IntraVENous Daily     ALLERGIES:  Patient has no known allergies. Social History     Socioeconomic History    Marital status:    Tobacco Use    Smoking status: Every Day     Packs/day: 0.50     Years: 40.00     Pack years: 20.00     Types: Cigarettes    Smokeless tobacco: Never   Vaping Use    Vaping Use: Never used   Substance and Sexual Activity    Alcohol use: Not Currently    Drug use: Not Currently     Social History     Tobacco Use   Smoking Status Every Day    Packs/day: 0.50    Years: 40.00    Pack years: 20.00    Types: Cigarettes   Smokeless Tobacco Never     No family history on file. ROS: The patient has no difficulty with chest pain or shortness of breath. No fever or chills. Comprehensive review of systems was otherwise unremarkable except as noted above.     Physical Exam:   /77   Pulse 82   Temp 98 °F (36.7 °C) (Oral)   Resp 18   Ht 6' (1.829 m)   Wt 194 lb 10.7 oz (88.3 kg)   SpO2 97%   BMI 26.40 kg/m²   Vitals:    02/19/23 2108 02/19/23 2337 02/20/23 0035 02/20/23 0105   BP:  136/77     Pulse:  82     Resp: 18 18 18 18   Temp:  98 °F (36.7 °C)     TempSrc:  Oral     SpO2:  97%     Weight:       Height:         02/19 1901 - 02/20 0700  In: -   Out: 550 [Urine:550]  02/18 0701 - 02/19 1900  In: 1123 [I.V.:1123]  Out: 1350 [Urine:1350]    Constitutional: Alert, oriented, cooperative patient in no acute distress; appears stated age    Eyes:Sclera are clear. EOMs intact  ENMT: no external lesions gross hearing normal; no obvious neck masses, no ear or lip lesions, nares normal  CV: RRR. Normal perfusion  Resp: No JVD.  Breathing is  non-labored; no audible wheezing.      GI: soft and non-distended, epigastric pain       Musculoskeletal: unremarkable with normal function. No embolic signs or cyanosis.   Neuro:  Oriented; moves all 4; no focal deficits  Psychiatric: normal affect and mood, no memory impairment    Recent vitals (if inpt):  Patient Vitals for the past 24 hrs:   BP Temp Temp src Pulse Resp SpO2 Height   02/20/23 0105 -- -- -- -- 18 -- --   02/20/23 0035 -- -- -- -- 18 -- --   02/19/23 2337 136/77 98 °F (36.7 °C) Oral 82 18 97 % --   02/19/23 2108 -- -- -- -- 18 -- --   02/19/23 2038 -- -- -- -- 18 -- --   02/19/23 1945 124/75 98.4 °F (36.9 °C) Oral 79 19 93 % --   02/19/23 1556 131/69 98.4 °F (36.9 °C) Oral 81 18 98 % --   02/19/23 1329 -- -- -- -- -- -- 6' (1.829 m)   02/19/23 1101 114/72 98.1 °F (36.7 °C) Oral 82 18 98 % --   02/19/23 0754 130/62 98.8 °F (37.1 °C) Oral 80 19 (!) 89 % --   02/19/23 0343 116/65 98.4 °F (36.9 °C) Oral 78 18 91 % --   02/19/23 0258 -- -- -- -- 16 -- --   02/19/23 0228 -- -- -- -- 16 -- --       Amount and/or Complexity of Data Reviewed and Analyzed:  I reviewed and analyzed all of the unique labs and radiologic studies that are shown below as well as any that are in the HPI, and any that are in the  expanded problem list below  *Each unique test, order, or document contributes to the combination of 2 or combination of 3 in Category 1 below. For this visit I also reviewed old records and prior notes. Recent Labs     02/19/23  0207   WBC 13.7*   HGB 14.6         K 3.7      CO2 23   BUN 9   ALT 15     Review of most recent CBC  Lab Results   Component Value Date    WBC 13.7 (H) 02/19/2023    HGB 14.6 02/19/2023    HCT 43.1 02/19/2023    MCV 92.7 02/19/2023     02/19/2023       Review of most recent BMP  Lab Results   Component Value Date/Time     02/19/2023 02:07 AM    K 3.7 02/19/2023 02:07 AM     02/19/2023 02:07 AM    CO2 23 02/19/2023 02:07 AM    BUN 9 02/19/2023 02:07 AM    CREATININE 0.80 02/19/2023 02:07 AM    GLUCOSE 89 02/19/2023 02:07 AM    CALCIUM 8.5 02/19/2023 02:07 AM        Review of most recent LFTs (and lipase if done)  Lab Results   Component Value Date    ALT 15 02/19/2023    AST 8 (L) 02/19/2023    ALKPHOS 97 02/19/2023    BILITOT 1.4 (H) 02/19/2023     Lab Results   Component Value Date    LIPASE 580 (H) 02/19/2023       No results found for: INR, APTT, LCAD    Review of most recent HgbA1c  No results found for: LABA1C    Nutritional assessment screen for wound healing issues:  Lab Results   Component Value Date    LABALBU 2.7 (L) 02/19/2023       @lastcovr@    Xray Result (most recent):  No results found for this or any previous visit from the past 3650 days. CT Result (most recent):  CT ABDOMEN PELVIS W IV CONTRAST 02/18/2023    Addendum 2/18/2023 10:25 PM  ADDENDUM:    Case discussed with MD.    Axial image 29 appears to show fluid in the lumen of the gallbladder  communicating with fluid in the lumen of the colon. This is a subtle finding;  artifact cannot be completely excluded. Coronal images 23 and 24 show extraluminal fluid density abutting the superior  margin of the colon where it abuts the gallbladder.     Emma Garnica M.D.  2/18/2023 10:25:00 PM    Addendum 2/18/2023  9:07 PM  ADDENDUM:    Latisha Tierney 94    Krystyna Guajardo M.D.  2/18/2023 9:07:00 PM    Narrative  EXAMINATION: CT SCAN OF THE ABDOMEN AND PELVIS WITH INTRAVENOUS CONTRAST    DATE OF EXAM: 2/18/2023 5:15 PM    HISTORY: upper abd pain s/p ERCP    COMPARISON: None. TECHNIQUE: CT examination of the abdomen and pelvis was performed following the  intravenous administration of 100 mL of Isovue-370. CT dose lowering techniques  were used, to include: automated exposure control, adjustment for patient size,  and/or use of iterative reconstruction. FINDINGS:    ABDOMEN/PELVIS:    Lower Chest: Scattered atelectasis in the lung bases. Liver: Normal.    Gallbladder/Billary: The gallbladder is inflamed. 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 A biliary  stent is in good position. Pancreas: Normal.    Spleen: Normal.    Adrenal Glands: Normal.    Kidneys: Normal.    GI Tract: The stomach and duodenum are unremarkable. Normal appendix. Normal  small bowel. Mesentery/Peritoneum: Hazy edema and inflammation in the upper central  mesentery. No free intraperitoneal air. Vasculature: Normal.    Lymph Nodes: Normal.    Abdominal Wall: See musculoskeletal section. Bladder: Normal.    Reproductive: Normal.    Musculoskeletal: Normal.    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 2. Biliary stent in good position. Krystyna Guajardo M.D.  2/18/2023 8:58:00 PM    US Result (most recent):  No results found for this or any previous visit from the past 3650 days. Admission date (for inpatients): 2/18/2023   * No surgery found *  * No surgery found *        ASSESSMENT/PLAN:  [unfilled]  Principal Problem:    Sepsis ([address]  Active Problems:    Biliary stricture    Abdominal pain    Hyponatremia    Tobacco use    Pancreatitis  Resolved Problems:    * No resolved hospital problems.  *     Patient Active Problem List    Diagnosis Date Noted    Sepsis ([facility] [address] 02/18/2023     Priority: Medium    Biliary stricture 02/18/2023     Priority: Medium    Abdominal pain 02/18/2023     Priority: Medium    Hyponatremia 02/18/2023     Priority: Medium    Tobacco use 02/18/2023     Priority: Medium    Pancreatitis 02/18/2023     Priority: Medium          Number and Complexity of Problems addressed and   Risks of complications and/or morbidity of management            Acute Pancreatitis after EUS and FNA of pancreas  NPO  IVF  Morphine ordered for pain  Await FNA biopsy results  Etiology of distal CBD stricture most likely recurrent pancreatitis from alcohol abuse        Abnormal GB on CT  Possible cholecysto-colic fistula vs air in GB after ERCP and stent with surrrounding pancreatic edema  It appears from the ERCP the cystic duct was patent at that time  IV Zosyn  Follow  No plans for surgery at this time  Await resolution of acute pancreatitis as first step                Level of MDM (2/3 elements below)  Number and Complexity of Problems Addressed Amount and/or Complexity of Data to be Reviewed and Analyzed  *Each unique test, order, or document contributes to the combination of 2 or combination of 3 in Category 1 below. Risk of Complications and/or Morbidity or Mortality of pt Management     44746  33394 SF Minimal  ?1self-limited or minor problem Minimal or none Minimal risk of morbidity from additional diagnostic testing or Rx   92096  44724 Low Low  ? 2or more self-limited or minor problems;    or  ? 1stable chronic illness;    or  ?1acute, uncomplicated illness or injury   Limited  (Must meet the requirements of at least 1 of the 2 categories)  Category 1: Tests and documents   ? Any combination of 2 from the following:  ?Review of prior external note(s) from each unique source*;  ?review of the result(s) of each unique test*;   ?ordering of each unique test*    or   Category 2: Assessment requiring an independent historian(s)  (For the categories of independent interpretation of tests and discussion of management or test interpretation, see moderate or high) Low risk of morbidity from additional diagnostic testing or treatment     66125  52490 Mod Moderate  ? 1or more chronic illnesses with exacerbation, progression, or side effects of treatment;    or  ?2or more stable chronic illnesses;    or  ?1undiagnosed new problem with uncertain prognosis;    or  ?1acute illness with systemic symptoms;    or  ?1acute complicated injury   Moderate  (Must meet the requirements of at least 1 out of 3 categories)  Category 1: Tests, documents, or independent historian(s)  ? Any combination of 3 from the following:   ?Review of prior external note(s) from each unique source*;  ?Review of the result(s) of each unique test*;  ?Ordering of each unique test*;  ?Assessment requiring an independent historian(s)    or  Category 2: Independent interpretation of tests   ? Independent interpretation of a test performed by another physician/other qualified health care professional (not separately reported);     or  Category 3: Discussion of management or test interpretation  ? Discussion of management or test interpretation with external physician/other qualified health care professional/appropriate source (not separately reported)   Moderate risk of morbidity from additional diagnostic testing or treatment  Examples only:  ?Prescription drug management   ? Decision regarding minor surgery with identified patient or procedure risk factors  ? Decision regarding elective major surgery without identified patient or procedure risk factors   ? Diagnosis or treatment significantly limited by social determinants of health       08912  44713 High High  ? 1or more chronic illnesses with severe exacerbation, progression, or side effects of treatment;    or  ?1 acute or chronic illness or injury that poses a threat to life or bodily function   Extensive  (Must meet the requirements of at least 2 out of 3 categories)  Category 1: Tests, documents, or independent historian(s)  ? Any combination of 3 from the following:   ?Review of prior external note(s) from each unique source*;  ?Review of the result(s) of each unique test*;   ?Ordering of each unique test*;   ?Assessment requiring an independent historian(s)    or   Category 2: Independent interpretation of tests   ? Independent interpretation of a test performed by another physician/other qualified health care professional (not separately reported);     or  Category 3: Discussion of management or test interpretation  ? Discussion of management or test interpretation with external physician/other qualified health care professional/appropriate source (not separately reported)   High risk of morbidity from additional diagnostic testing or treatment  Examples only:  ?Drug therapy requiring intensive monitoring for toxicity  ? Decision regarding elective major surgery with identified patient or procedure risk factors  ? Decision regarding emergency major surgery  ? Decision regarding hospitalization  ? Decision not to resuscitate or to de-escalate care because of poor prognosis      Parenteral controlled substances             I have personally performed a face-to-face diagnostic evaluation and management  service on this patient. I have independently seen the patient. I have independently obtained the above history from the patient/family. I have independently examined the patient with above findings. I have independently reviewed data/labs for this patient and developed the above plan of care (MDM).       Signed: Chanell Abel MD  2/20/2023    1:30 AM

## 2023-02-20 NOTE — PROGRESS NOTES
[facility] Progress Note         Admit Date:  2/18/2023    Today's Date:  2/20/2023    Cc: pancreatitis, epigastric pain    Subjective:     Patient still with epigastric pain, mildly improved today. No nausea or vomiting. Reports he had no pain prior to ERCP, EUS. ERCP 2/1/23       INDICATION: Bile duct dilatation       Fluoro Time: 4 minutes and 3 seconds, 13 spot films       Multiple spot films obtained during ERCP performed by Dr. Jarrell Umm: The common bile duct is dilated. There is no focal stricture. No   filling defects. CT A/P W IV CONTRAST 2/28/23   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 2. Biliary stent in good position. ADDENDUM:        Case discussed with MD.        Axial image 29 appears to show fluid in the lumen of the gallbladder    communicating with fluid in the lumen of the colon. This is a subtle    finding;    artifact cannot be completely excluded.        Coronal images 23 and 24 show extraluminal fluid density abutting the    superior    margin of the colon where it abuts the gallbladder           Emma Garnica M.D.    2/18/2023 8:58:00 PM       Medications:   Current Facility-Administered Medications   Medication Dose Route Frequency    HYDROmorphone (DILAUDID) injection 1 mg  1 mg IntraVENous Q4H PRN    HYDROmorphone (DILAUDID) injection 0.5 mg  0.5 mg IntraVENous Q4H PRN    dextrose 5 % and 0.45 % NaCl with KCl 20 mEq infusion   IntraVENous Continuous    sodium chloride flush 0.9 % injection 5-40 mL  5-40 mL IntraVENous PRN    0.9 % sodium chloride infusion   IntraVENous PRN    ondansetron (ZOFRAN-ODT) disintegrating tablet 4 mg  4 mg Oral Q8H PRN    Or    ondansetron (ZOFRAN) injection 4 mg  4 mg IntraVENous Q6H PRN    acetaminophen (TYLENOL) tablet 650 mg  650 mg Oral Q6H PRN    piperacillin-tazobactam (ZOSYN) 3,375 mg in sodium chloride 0.9 % 50 mL IVPB (mini-bag)  3,375 mg IntraVENous q8h    pantoprazole (PROTONIX) 40 mg in sodium chloride (PF) 0.9 % 10 mL injection  40 mg IntraVENous Daily       Review of Systems:  ROS was obtained, with pertinent positives as listed above. No chest pain or SOB. Diet:  NPO    Objective:   Vitals:  /72   Pulse 79   Temp 97.7 °F (36.5 °C) (Oral)   Resp 18   Ht 6' (1.829 m)   Wt 194 lb 10.7 oz (88.3 kg)   SpO2 95%   BMI 26.40 kg/m²   Intake/Output:  No intake/output data recorded. 02/18 1901 - 02/20 0700  In: 2159 [I.V.:2159]  Out: 1900 [Urine:1900]  Exam:  General appearance: alert, cooperative, no distress  Lungs: clear to auscultation bilaterally anteriorly  Heart: regular rate and rhythm  Abdomen: soft, full, tender to epigastrium. Bowel sounds normal. No masses, no organomegaly  Extremities: extremities normal, atraumatic, no cyanosis or edema  Neuro:  alert and oriented    Data Review (Labs):    Recent Labs     02/18/23  1731 02/19/23  0207 02/20/23  0646   WBC 15.8* 13.7* 9.1   HGB 16.7 14.6 13.5*   HCT 48.5 43.1 39.9*    202 206   MCV 90.5 92.7 92.8   * 135 136   K 3.7 3.7 3.5    106 106   CO2 23 23 23   BUN 10 9 8   MG 1.8  --   --    AST 12* 8* 7*   ALT 42 15 12       Assessment:     Principal Problem:    Sepsis (HCC)  Active Problems:    Biliary stricture    Abdominal pain    Hyponatremia    Tobacco use    Pancreatitis  Resolved Problems:    * No resolved hospital problems. *      Plan:     60 yo male admitted 2/18 with pancreatitis. Symptoms began in January with nausea and gerd. U/S revealed biliary dilation with CBD 10 mm. ERCP 2/1/23 with biliary stricture seen and stent placed. EUS 2/16/23 with chronic changes of pancreatitis seen. Pancreatic pseudocyst noted. Patient reports increased pain since EUS with lipase 1088 on admission. CT on current admission with pancreatitis, concern for choledochocolonic fistula vs artifact; biliary stent in place.      Bilirubin slowly trending down  WBC improved and normal now at 9.1; remains on Zosyn q8 hr . NPO with IV hydration  Surgery following - eventual cholecystectomy recommended     Patient is seen and examined in collaboration with Dr. Nina Arciniega. Assessment and plan as per Dr. Nina Arciniega.   Stephy Arreguin NP

## 2023-02-21 LAB
ALBUMIN SERPL-MCNC: 2.4 G/DL (ref 3.5–5)
ALBUMIN SERPL-MCNC: 2.6 G/DL (ref 3.5–5)
ALBUMIN/GLOB SERPL: 0.6 (ref 0.4–1.6)
ALBUMIN/GLOB SERPL: 0.7 (ref 0.4–1.6)
ALP SERPL-CCNC: 80 U/L (ref 50–136)
ALP SERPL-CCNC: 81 U/L (ref 50–136)
ALT SERPL-CCNC: 15 U/L (ref 12–65)
ALT SERPL-CCNC: 19 U/L (ref 12–65)
ANION GAP SERPL CALC-SCNC: 6 MMOL/L (ref 2–11)
AST SERPL-CCNC: 14 U/L (ref 15–37)
AST SERPL-CCNC: 14 U/L (ref 15–37)
BILIRUB DIRECT SERPL-MCNC: 0.4 MG/DL
BILIRUB SERPL-MCNC: 1.1 MG/DL (ref 0.2–1.1)
BILIRUB SERPL-MCNC: 1.4 MG/DL (ref 0.2–1.1)
BUN SERPL-MCNC: 6 MG/DL (ref 6–23)
CALCIUM SERPL-MCNC: 8.6 MG/DL (ref 8.3–10.4)
CHLORIDE SERPL-SCNC: 105 MMOL/L (ref 101–110)
CO2 SERPL-SCNC: 24 MMOL/L (ref 21–32)
CREAT SERPL-MCNC: 0.5 MG/DL (ref 0.8–1.5)
GLOBULIN SER CALC-MCNC: 4 G/DL (ref 2.8–4.5)
GLOBULIN SER CALC-MCNC: 4 G/DL (ref 2.8–4.5)
GLUCOSE SERPL-MCNC: 116 MG/DL (ref 65–100)
LIPASE SERPL-CCNC: 130 U/L (ref 73–393)
POTASSIUM SERPL-SCNC: 3.5 MMOL/L (ref 3.5–5.1)
PROT SERPL-MCNC: 6.4 G/DL (ref 6.3–8.2)
PROT SERPL-MCNC: 6.6 G/DL (ref 6.3–8.2)
SODIUM SERPL-SCNC: 135 MMOL/L (ref 133–143)

## 2023-02-21 PROCEDURE — 6360000002 HC RX W HCPCS: Performed by: NURSE PRACTITIONER

## 2023-02-21 PROCEDURE — 36573 INSJ PICC RS&I 5 YR+: CPT

## 2023-02-21 PROCEDURE — 2580000003 HC RX 258: Performed by: SURGERY

## 2023-02-21 PROCEDURE — C9113 INJ PANTOPRAZOLE SODIUM, VIA: HCPCS | Performed by: INTERNAL MEDICINE

## 2023-02-21 PROCEDURE — 80076 HEPATIC FUNCTION PANEL: CPT

## 2023-02-21 PROCEDURE — A4216 STERILE WATER/SALINE, 10 ML: HCPCS | Performed by: INTERNAL MEDICINE

## 2023-02-21 PROCEDURE — C1751 CATH, INF, PER/CENT/MIDLINE: HCPCS

## 2023-02-21 PROCEDURE — 36415 COLL VENOUS BLD VENIPUNCTURE: CPT

## 2023-02-21 PROCEDURE — 6360000002 HC RX W HCPCS: Performed by: INTERNAL MEDICINE

## 2023-02-21 PROCEDURE — 83690 ASSAY OF LIPASE: CPT

## 2023-02-21 PROCEDURE — 02HV33Z INSERTION OF INFUSION DEVICE INTO SUPERIOR VENA CAVA, PERCUTANEOUS APPROACH: ICD-10-PCS | Performed by: FAMILY MEDICINE

## 2023-02-21 PROCEDURE — 2580000003 HC RX 258: Performed by: INTERNAL MEDICINE

## 2023-02-21 PROCEDURE — 6360000002 HC RX W HCPCS: Performed by: FAMILY MEDICINE

## 2023-02-21 PROCEDURE — 1100000003 HC PRIVATE W/ TELEMETRY

## 2023-02-21 PROCEDURE — B5181ZA FLUOROSCOPY OF SUPERIOR VENA CAVA USING LOW OSMOLAR CONTRAST, GUIDANCE: ICD-10-PCS | Performed by: FAMILY MEDICINE

## 2023-02-21 PROCEDURE — 2580000003 HC RX 258: Performed by: FAMILY MEDICINE

## 2023-02-21 PROCEDURE — 99232 SBSQ HOSP IP/OBS MODERATE 35: CPT | Performed by: SURGERY

## 2023-02-21 PROCEDURE — 2500000003 HC RX 250 WO HCPCS: Performed by: FAMILY MEDICINE

## 2023-02-21 PROCEDURE — 2500000003 HC RX 250 WO HCPCS: Performed by: SURGERY

## 2023-02-21 PROCEDURE — 80053 COMPREHEN METABOLIC PANEL: CPT

## 2023-02-21 PROCEDURE — 1100000000 HC RM PRIVATE

## 2023-02-21 RX ORDER — SODIUM CHLORIDE 9 MG/ML
25 INJECTION, SOLUTION INTRAVENOUS PRN
Status: DISCONTINUED | OUTPATIENT
Start: 2023-02-21 | End: 2023-02-27

## 2023-02-21 RX ORDER — SODIUM CHLORIDE 0.9 % (FLUSH) 0.9 %
5-40 SYRINGE (ML) INJECTION EVERY 12 HOURS SCHEDULED
Status: DISCONTINUED | OUTPATIENT
Start: 2023-02-21 | End: 2023-02-28 | Stop reason: HOSPADM

## 2023-02-21 RX ORDER — SODIUM CHLORIDE 0.9 % (FLUSH) 0.9 %
5-40 SYRINGE (ML) INJECTION PRN
Status: DISCONTINUED | OUTPATIENT
Start: 2023-02-21 | End: 2023-02-27

## 2023-02-21 RX ADMIN — PIPERACILLIN AND TAZOBACTAM 3375 MG: 3; .375 INJECTION, POWDER, LYOPHILIZED, FOR SOLUTION INTRAVENOUS at 11:39

## 2023-02-21 RX ADMIN — HYDROMORPHONE HYDROCHLORIDE 1 MG: 1 INJECTION, SOLUTION INTRAMUSCULAR; INTRAVENOUS; SUBCUTANEOUS at 21:02

## 2023-02-21 RX ADMIN — HYDROMORPHONE HYDROCHLORIDE 1 MG: 1 INJECTION, SOLUTION INTRAMUSCULAR; INTRAVENOUS; SUBCUTANEOUS at 00:55

## 2023-02-21 RX ADMIN — PIPERACILLIN AND TAZOBACTAM 3375 MG: 3; .375 INJECTION, POWDER, LYOPHILIZED, FOR SOLUTION INTRAVENOUS at 20:42

## 2023-02-21 RX ADMIN — POTASSIUM CHLORIDE: 2 INJECTION, SOLUTION, CONCENTRATE INTRAVENOUS at 17:44

## 2023-02-21 RX ADMIN — SODIUM CHLORIDE, PRESERVATIVE FREE 10 ML: 5 INJECTION INTRAVENOUS at 20:42

## 2023-02-21 RX ADMIN — PANTOPRAZOLE SODIUM 40 MG: 40 INJECTION, POWDER, LYOPHILIZED, FOR SOLUTION INTRAVENOUS at 08:53

## 2023-02-21 RX ADMIN — HYDROMORPHONE HYDROCHLORIDE 0.5 MG: 1 INJECTION, SOLUTION INTRAMUSCULAR; INTRAVENOUS; SUBCUTANEOUS at 06:11

## 2023-02-21 RX ADMIN — PIPERACILLIN AND TAZOBACTAM 3375 MG: 3; .375 INJECTION, POWDER, LYOPHILIZED, FOR SOLUTION INTRAVENOUS at 04:48

## 2023-02-21 RX ADMIN — DEXTROSE MONOHYDRATE, SODIUM CHLORIDE, AND POTASSIUM CHLORIDE: 50; 4.5; 1.49 INJECTION, SOLUTION INTRAVENOUS at 11:39

## 2023-02-21 ASSESSMENT — PAIN - FUNCTIONAL ASSESSMENT
PAIN_FUNCTIONAL_ASSESSMENT: ACTIVITIES ARE NOT PREVENTED

## 2023-02-21 ASSESSMENT — PAIN DESCRIPTION - DESCRIPTORS
DESCRIPTORS: ACHING
DESCRIPTORS: ACHING;BURNING
DESCRIPTORS: ACHING;STABBING;SHARP;SHOOTING

## 2023-02-21 ASSESSMENT — PAIN DESCRIPTION - ORIENTATION
ORIENTATION: MID
ORIENTATION: MID;ANTERIOR;POSTERIOR
ORIENTATION: MID;LOWER

## 2023-02-21 ASSESSMENT — PAIN DESCRIPTION - LOCATION
LOCATION: BACK
LOCATION: BACK;ABDOMEN
LOCATION: BACK

## 2023-02-21 ASSESSMENT — PAIN SCALES - GENERAL
PAINLEVEL_OUTOF10: 5
PAINLEVEL_OUTOF10: 9
PAINLEVEL_OUTOF10: 7

## 2023-02-21 ASSESSMENT — PAIN SCALES - WONG BAKER: WONGBAKER_NUMERICALRESPONSE: 0

## 2023-02-21 NOTE — PROGRESS NOTES
Physician Progress Note      PATIENT:               Rachelle Brooks  CSN #:                  [id number]  :                       1965  ADMIT DATE:       2023 5:20 PM  [address] [place] DATE:  RESPONDING  PROVIDER #:        Justina Wiggins MD          QUERY TEXT:    Patient admitted with sepsis. Noted documentation of possible   choledochocolonic fistula. In order to support the diagnosis of   choledochocolonic fistula, please include additional clinical indicators in   your documentation. Or please document if the diagnosis of choledochocolonic   fistula has been ruled out after further study. The medical record reflects the following:  Risk Factors: 61 y.o. male with recent GI complaints followed by GI s/p ERCP   with biliary stent on 23, EUS with FNA  who is evaluated with   abdominal pain. Started s/p ERCP and ongoing but worse after FFNA. Clinical Indicators: Per notes \"Possible choledochocolonic fistula, CT a/p   reviewed, possibly fistula versus air in GB after ERCP\"  CTAP :  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 2. Biliary stent in good position. Treatment: Surgery & GI consulted, CTAP      Thank you,  Farooq Monique RN, BSN, LISA Hodges. [email or web address]  . Options provided:  -- Choledochocolonic fistula present as evidenced by, Please document   evidence. -- Choledochocolonic fistula was ruled out  -- Other - I will add my own diagnosis  -- Disagree - Not applicable / Not valid  -- Disagree - Clinically unable to determine / Unknown  -- Refer to Clinical Documentation Reviewer    PROVIDER RESPONSE TEXT:    Unclear at this time.     Query created by: Ira Armijo on 2023 12:49 PM      Electronically signed by:  Justina Wiggins MD 2023 12:52 PM

## 2023-02-21 NOTE — PROGRESS NOTES
END OF SHIFT NOTE:    INTAKE/OUTPUT  02/20 0701 - 02/21 0700  In: 2012 [I.V.:2012]  Out: 7593 [Urine:1275]  Voiding: Yes  Catheter: No  Drain:              Flatus: Patient does have flatus present. Stool:  occurrences. Characteristics:  Stool Appearance: Unable to assess  Stool Color: Unable to assess  Stool Amount: Unable to assess  Stool Assessment  Stool Appearance: Unable to assess  Stool Color: Unable to assess  Stool Amount: Unable to assess  Last BM (including prior to admit): 02/16/23 (per patient)    Emesis:  occurrences. Characteristics:        VITAL SIGNS  Patient Vitals for the past 12 hrs:   Temp Pulse Resp BP SpO2   02/21/23 0718 97.9 °F (36.6 °C) 65 18 121/74 93 %   02/21/23 0413 99.1 °F (37.3 °C) 72 19 110/72 93 %   02/20/23 2329 98.2 °F (36.8 °C) 75 18 131/71 93 %       Pain Assessment  Pain Level: 5 (02/21/23 0611)  Pain Location: Back  Patient's Stated Pain Goal: 0 - No pain    Ambulating  Yes    Shift report given to oncoming nurse at the bedside.     Michael Rice RN

## 2023-02-21 NOTE — PROGRESS NOTES
PICC Placement Note    PRE-PROCEDURE VERIFICATION    Correct Procedure: yes  Time out completed with assistant Vargas Polo RN, VA-BC and all persons present in agreement with time out. Risks and benefits reviewed with patient and informed consent obtained prior to assessment and procedure. Correct Site: yes  Temperature: Temp: 98.1 °F (36.7 °C), Temperature Source:    Recent Labs     02/20/23  0646 02/21/23  0544   BUN 8 6     --    WBC 9.1  --      Allergies: Patient has no known allergies. Education materials for [facility] given to patient or family. PROCEDURE DETAIL  A double lumen PICC line was started for Total parenteral nutrition. The following documentation is in addition to the PICC properties in the lines/airways flowsheet:    Lot #: [id number]  Xylocaine used: Yes  Mid-Arm Circumference: 31 (cm)  Internal Catheter Length: 41 (cm)  External Catheter Length: 0 (cm)  Total Catheter Length: 41 (cm)  Vein Selection for PICC: right brachial  Central Line Insertion Bundle followed: Yes  Complication Related to Insertion: none    Both the insertion guidewire and ECG guidewire were removed intact all ports have positive blood return and were flush well with normal saline. The location of the tip of the PICC is verified using ECG technology. The tip is in the SVC per ECG reading. See image below.      Line is okay to use: yes            Latoya Hagen RN, PCCN, VA-BC

## 2023-02-21 NOTE — PROGRESS NOTES
[facility]   DAILY PROGRESS NOTE    Admit Date:  2/18/2023    CC:  Pancreatitis, Choledochocolonic Fistula    Problem List:  Principal Problem:    Sepsis ([address]  Active Problems:    Biliary stricture    Abdominal pain    Hyponatremia    Tobacco use    Pancreatitis  Resolved Problems:    * No resolved hospital problems. *    Mr. Emmy Sue is a 60 yo male admitted 2/18 with pancreatitis. Symptoms began in January with nausea and GERD. U/S revealed biliary dilation with CBD 10 mm. ERCP 2/1/23 with biliary stricture seen and stent placed. EUS 2/16/23 with chronic changes of pancreatitis seen. Pancreatic pseudocyst noted. Patient reports increased pain since EUS with lipase 1088 on admission. CT on current admission with pancreatitis, concern for choledochocolonic fistula vs artifact; biliary stent in place. Surgery following.    TB 1.4 today. He is feeling better with less pain. Starting on TPN today. 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 Planning for surgery after resolution of pancreatitis. - We will sign off. Please call us with any further questions or concerns. - Will arrange outpatient follow up in 4-6 weeks. Jorge Guillen MD   [facility]        Subjective:     Patient says he is feeling better. He is having less abdominal pain. He is getting started on TPN today.       Medications:   Current Facility-Administered Medications   Medication Dose Route Frequency Provider Last Rate Last Admin    HYDROmorphone (DILAUDID) injection 1 mg  1 mg IntraVENous Q4H PRN Charmel Perches, APRN - CNP   1 mg at 02/21/23 0055    HYDROmorphone (DILAUDID) injection 0.5 mg  0.5 mg IntraVENous Q4H PRN Charmel Perches, APRN - CNP   0.5 mg at 02/21/23 0611    dextrose 5 % and 0.45 % NaCl with KCl 20 mEq infusion   IntraVENous Continuous Jessica Merlos  mL/hr at 02/20/23 1201 New Bag at 02/20/23 1201    sodium chloride flush 0.9 % injection 5-40 mL  5-40 mL IntraVENous PRN Venkata Harper MD        0.9 % sodium chloride infusion   IntraVENous PRN Venkata Harper MD        ondansetron (ZOFRAN-ODT) disintegrating tablet 4 mg  4 mg Oral Q8H PRN Venkata Harper MD        Or    ondansetron (ZOFRAN) injection 4 mg  4 mg IntraVENous Q6H PRN Venkata Harper MD        acetaminophen (TYLENOL) tablet 650 mg  650 mg Oral Q6H PRN Venkata Harper MD        piperacillin-tazobactam (ZOSYN) 3,375 mg in sodium chloride 0.9 % 50 mL IVPB (mini-bag)  3,375 mg IntraVENous q8h Venkata Harper MD 12.5 mL/hr at 02/21/23 0448 3,375 mg at 02/21/23 0448    pantoprazole (PROTONIX) 40 mg in sodium chloride (PF) 0.9 % 10 mL injection  40 mg IntraVENous Daily Venkata Harper MD   40 mg at 02/20/23 1212       Review of Systems:  ROS was obtained, with pertinent positives as listed above. No chest pain or SOB. Diet:  NPO    Objective:   Vitals:  /74   Pulse 65   Temp 97.9 °F (36.6 °C) (Oral)   Resp 18   Ht 6' (1.829 m)   Wt 194 lb 12.8 oz (88.4 kg)   SpO2 93%   BMI 26.42 kg/m²   Intake/Output:  No intake/output data recorded. 02/19 1901 - 02/21 0700  In: 3048 [I.V.:3048]  Out: 1825 [Urine:1825]  Exam:  General appearance: alert, cooperative, no distress  Lungs: clear to auscultation bilaterally anteriorly  Heart: regular rate and rhythm  Abdomen: soft, non-tender.  Bowel sounds normal.   Extremities: extremities normal, atraumatic, no cyanosis or edema  Neuro:  alert and oriented    Data Review (Labs):    Recent Labs     02/18/23  1731 02/19/23  0207 02/20/23  0646 02/21/23  0544   WBC 15.8* 13.7* 9.1  --    HGB 16.7 14.6 13.5*  --    HCT 48.5 43.1 39.9*  --     202 206  --    MCV 90.5 92.7 92.8  --    * 135 136 135   K 3.7 3.7 3.5 3.5    106 106 105   CO2 23 23 23 24   BUN 10 9 8 6   CREATININE 1.00 0. 80 0.60* 0.50*   CALCIUM 9.2 8.5 8.7 8.6   MG 1.8  --   --   --    GLUCOSE 117* 89 64* 116*   AST 12* 8* 7* 14*   ALT 42 15 12 15   BILITOT 1.4* 1.4* 1.2* 1.4*   ALBUMIN 0.7 0.7 0.6 0.6   PROT 8.3* 6.6 6.5 6.4   LIPASE 1,088* 580*  --  130         Twyla Farmer MD  [facility]

## 2023-02-21 NOTE — CONSULTS
Comprehensive Nutrition Assessment    Type and Reason for Visit: Reassess, Consult  Malnutrition Screening Tool: Malnutrition Screen  Have you recently lost weight without trying?: 2 to 13 pounds (1 point)  Have you been eating poorly because of a decreased appetite?: Yes (1 point)  Malnutrition Screening Tool Score: 2  TPN Management (Hospitalist)    Nutrition Recommendations/Plan:   Parenteral Nutrition:  Peripheral parenteral nutrition begins at 1800 today  Initiate: Dex 5%, 4.25% AA 3 L (125ml/hr)   Hold 250 ml 20% lipids daily pending am labs with active pancreatitis  To provide: 1020 kcal/d (58% of needs), 128 grams of protein/d (120% of needs), 150 grams of CHO/d and ~3000 ml of total volume/d. Lytes/L:   Sodium ( 75 meq NaCl), Potassium ( 15 meq KCl) Phosphorus ( 5 mmol KPO4), Calcium (4.5 meq), Magnesium 2 meq   Osmolarity ~852  Other additives:   MVI Monday Wednesday Friday due to national shortages, MTE hold for now with mildly elevated bili  Nutrition Related Medication Management:  Electrolyte Replacement Protocol PRN Potassium, Phosphorus, and Magnesium   IVF:  Discontinue at 1800  Labs:   BMP daily  Mg daily x 3 days at initiation then MWF  Phos daily x 3 days at initiation then MWF    Triglyceride tomorrow  POC Glucoses/SSI Not indicated       Malnutrition Assessment:  Malnutrition Status: At risk for malnutrition (Comment) (weight loss, variable po over last 2 months)    No kimberly wasting  Nutrition Assessment:  Nutrition History: Patient reports that prior to Feb 1st he has been losing weight and not eating well. He reports usual weight of 210 lbs and got down to about 183#s before the procedure 2/1/23. He says following the procedure we started to eat again very well with no issues and gained weight back to at least 190 #s. He reports he than had procedure on 2/16 following that he has been getting severe abdominal pain that is worse when eating/drinking.  He reports able to eat breakfast Friday morning with no N/V but significant pain. Do You Have Any Cultural, Protestant, or Ethnic Food Preferences?: No   Nutrition Background:   Wound Type: None   PMH only significant for GERD. Recent GI complaints followed by GI s/p ERCP with biliary stent on 2-1-23, EUS w/ FNA 2-16-23 who presented due to abdominal pain. Elevated lipase on admission. 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 Surgery consulted but no surgery for now awaiting resolution of pancreatitis. Nutrition Interval:  Patient seen up to windowsill talking on the phone with wife outside. Discussed IV nutrition and patient with appropriate questions which were answered. PICC ordered but still pending. Currently with single PIV. Will order PIV appropriate solution tonight. Will hold on lipids pending am labs with current pancreatitis. No significant chance of refeeding with good PO intake PTA and lytes stable with addition of dextrose 2/20.   Abdominal Status (last documented):   Last BM (including prior to admit): 02/16/23 (per patient), GI Symptoms: Flatus   Pertinent Medications: Protonix, Zosyn Q3 hrs  Continuous: none  IVF: D5 1/2NS @125 ml/hr   Electrolyte Replacement:  none  PRN: Zofran  Pertinent Labs:   Lab Results   Component Value Date/Time     02/21/2023 05:44 AM    K 3.5 02/21/2023 05:44 AM     02/21/2023 05:44 AM    CO2 24 02/21/2023 05:44 AM    BUN 6 02/21/2023 05:44 AM    CREATININE 0.50 02/21/2023 05:44 AM    GLUCOSE 116 02/21/2023 05:44 AM    CALCIUM 8.6 02/21/2023 05:44 AM    MG 1.8 02/18/2023 05:31 PM      No results found for: TRIG  Remarkable for: Na low normal, K low normal but stable with addition of dextrose containing fluids on 2/20, Mg low normal on admission      Current Nutrition Therapies:  Diet NPO Exceptions are: Ice Chips    Current Intake:   Average Meal Intake: NPO Average Supplements Intake: NPO      Anthropometric Measures:  Height: 6' (182.9 cm)  Current Body Wt: 182 lb 12.8 oz (82.9 kg) (2/21), Weight source: Standing Scale  BMI: 24.8, Normal Weight (BMI 22.0 to 24.9) age over 72     Ideal Body Weight (Kg) (Calculated): 81 kg (178 lbs), 109.4 %  Usual Body Wt: 210 lb (95.3 kg), Percent weight change: -7.3       BMI Category Normal Weight (BMI 22.0 to 24.9) age over 72  Estimated Daily Nutrient Needs:  Energy (kcal/day): 1463-8416 (20-25 kcal/kg) (Kcal/kg Weight used: 88.3 kg Current (2/19)  Protein (g/day):  (1-1.2 g/kg) Weight Used: (Current) 88.3 kg  Fluid (ml/day):   (1 ml/kcal)    Nutrition Diagnosis:   Inadequate oral intake related to altered GI function as evidenced by NPO or clear liquid status due to medical condition (pancreatitis and possible cholecystocolonic fistula), NPO status  Nutrition Interventions:   Food and/or Nutrient Delivery: Continue NPO, Start Parenteral Nutrition     Coordination of Nutrition Care: Continue to monitor while inpatient       Goals:   Previous Goal Met: No Progress toward Goal(s)  Active Goal: Tolerate nutrition support at goal rate (within 3 days)       Nutrition Monitoring and Evaluation:      Food/Nutrient Intake Outcomes: Diet Advancement/Tolerance, Parenteral Nutrition Intake/Tolerance  Physical Signs/Symptoms Outcomes: Biochemical Data, GI Status, Fluid Status or Edema, Weight    Discharge Planning:     Too soon to determine    Cassidy Carey

## 2023-02-21 NOTE — PROGRESS NOTES
Physician Progress Note      PATIENT:               Roseann Espinoza  CSN #:                  [id number]  :                       1965  ADMIT DATE:       2023 5:20 PM  [address] [place] DATE:  RESPONDING  PROVIDER #:        Jese Valle MD          QUERY TEXT:    Pt admitted with pancreatitis and underwent ERCP  & FNA .? If possible,   please document in progress notes and discharge summary the relationship if   any between the pancreatitis and the surgery: The medical record reflects the following:  Risk Factors: [id number] y.o. male with recent GI complaints followed by GI who is   evaluated with abdominal pain. Clinical Indicators: \" ERCP with biliary stent on 23, EUS with FNA    who is evaluated with abdominal pain. Started s/p ERCP and ongoing   but worse after FFNA. \"  Treatment: GI consulted, NPO, IVF's, zosyn    Thank you,  Tracie Shen RN, BSN, CDI  [email or web address]  . Options provided:  -- Pancreatitis is due to the procedure  -- Pancreatitis is not due to the procedure, but is due to, Please specify. -- Pancreatitis is not due to the procedure, but is due to other incidental   risk factor, Please specify other incidental risk factor. -- Other - I will add my own diagnosis  -- Disagree - Not applicable / Not valid  -- Disagree - Clinically unable to determine / Unknown  -- Refer to Clinical Documentation Reviewer    PROVIDER RESPONSE TEXT:    Patient has pancreatitis due to the procedure.     Query created by: Meaghan Martinez on 2023 12:55 PM      Electronically signed by:  Jese Valle MD 2023 1:28 PM

## 2023-02-21 NOTE — PROGRESS NOTES
Hospitalist Progress Note   Admit Date:  2023  5:20 PM   Name:  Jeff Santiago. Age:  61 y.o. Sex:  male  :  1965   MRN:  [id number]   Room:      Presenting Complaint: Abdominal Pain     Reason(s) for Admission: Abdominal pain, epigastric [R10.13]  Cholecystitis [K81.9]  Sepsis ([address] [A41.9]  Acute biliary pancreatitis, unspecified complication status [Y10.51]     Hospital Course:   Jeff Bowman is a 61 y.o. CM with a PMH of tobacco use, recent GI complaints followed by GI s/p ERCP with biliary stent on 23, EUS w/ FNA 23 who admitted with post ERCP pancreatitis. FNA biopsy negative for malignancy. CT a/p indicated a possible choledochocolonic fistula. Started on supportive care and empiric antibiotics. GI and surgery consulted. Patient is strict n.p.o.  Nutrition consulted and plan to start TPN . Subjective & 24hr Events (23): Patient is seen at the bedside. Complaining of abdominal pain, mostly worse at night. Also reports back pain. Denies nausea, vomiting, chest pain or shortness of breath. Strict n.p.o. Discussed nutrition and will start patient on TPN. Assessment & Plan:     Post ERCP pancreatitis   Met SIRS criteria on admission likely in the setting of pancreatitis  Continue supportive care  On empiric antibiotic  Blood cultures negative to date  Pain control  GI following  Surgery recommend strict n.p.o. Will start patient on TPN, nutrition consulted  Will place order for PICC line today     Possible choledochocolonic fistula  CT a/p reviewed, possibly fistula versus air in GB after ERCP  General surgery consulted, no surgery at this time, need pancreatitis to resolve first    Hyponatremia  Resolved    PT/OT evals and PPD needed/ordered?   No    Diet:  Diet NPO Exceptions are: Ice Chips  VTE prophylaxis: SCD's   Code status: Full Code    Hospital Problems:  Principal Problem:    Sepsis ([address]  Active Problems:    Biliary stricture    Abdominal pain    Hyponatremia    Tobacco use    Pancreatitis  Resolved Problems:    * No resolved hospital problems. *      Objective:   Patient Vitals for the past 24 hrs:   Temp Pulse Resp BP SpO2   02/21/23 1051 97.5 °F (36.4 °C) 78 18 119/71 94 %   02/21/23 0718 97.9 °F (36.6 °C) 65 18 121/74 93 %   02/21/23 0413 99.1 °F (37.3 °C) 72 19 110/72 93 %   02/20/23 2329 98.2 °F (36.8 °C) 75 18 131/71 93 %   02/20/23 1907 98.2 °F (36.8 °C) 72 18 116/75 96 %   02/20/23 1443 97.9 °F (36.6 °C) 79 18 109/68 95 %         Oxygen Therapy  SpO2: 94 %  Pulse Oximetry Type: Intermittent  O2 Device: None (Room air)    Estimated body mass index is 26.42 kg/m² as calculated from the following:    Height as of this encounter: 6' (1.829 m). Weight as of this encounter: 194 lb 12.8 oz (88.4 kg). Intake/Output Summary (Last 24 hours) at 2/21/2023 1142  Last data filed at 2/21/2023 1015  Gross per 24 hour   Intake 2012 ml   Output 1275 ml   Net 737 ml           Physical Exam:   Blood pressure 119/71, pulse 78, temperature 97.5 °F (36.4 °C), temperature source Oral, resp. rate 18, height 6' (1.829 m), weight 194 lb 12.8 oz (88.4 kg), SpO2 94 %. General:    No cardiopulmonary distress  Head:  Normocephalic, atraumatic  Eyes:  Sclerae appear normal.  Pupils equally round. ENT:  Nares appear normal.  Moist oral mucosa  Neck:  No restricted ROM. Trachea midline   CV:   RRR. No m/r/g. Lungs: No wheezing. On room air. Abdomen:   Tender to palpate, nondistended. Extremities: No cyanosis or clubbing. Skin:     No rashes and normal coloration. Neuro:  CN II-XII grossly intact. Psych:  Normal mood and affect.       I have personally reviewed labs and tests:  Recent Labs:  Recent Results (from the past 48 hour(s))   Comprehensive Metabolic Panel    Collection Time: 02/20/23  6:46 AM   Result Value Ref Range    Sodium 136 133 - 143 mmol/L    Potassium 3.5 3.5 - 5.1 mmol/L    Chloride 106 101 - 110 mmol/L CO2 23 21 - 32 mmol/L    Anion Gap 7 2 - 11 mmol/L    Glucose 64 (L) 65 - 100 mg/dL    BUN 8 6 - 23 MG/DL    Creatinine 0.60 (L) 0.8 - 1.5 MG/DL    Est, Glom Filt Rate >60 >60 ml/min/1.73m2    Calcium 8.7 8.3 - 10.4 MG/DL    Total Bilirubin 1.2 (H) 0.2 - 1.1 MG/DL    ALT 12 12 - 65 U/L    AST 7 (L) 15 - 37 U/L    Alk Phosphatase 89 50 - 136 U/L    Total Protein 6.5 6.3 - 8.2 g/dL    Albumin 2.5 (L) 3.5 - 5.0 g/dL    Globulin 4.0 2.8 - 4.5 g/dL    Albumin/Globulin Ratio 0.6 0.4 - 1.6     CBC    Collection Time: 02/20/23  6:46 AM   Result Value Ref Range    WBC 9.1 4.3 - 11.1 K/uL    RBC 4.30 4.23 - 5.6 M/uL    Hemoglobin 13.5 (L) 13.6 - 17.2 g/dL    Hematocrit 39.9 (L) 41.1 - 50.3 %    MCV 92.8 82 - 102 FL    MCH 31.4 26.1 - 32.9 PG    MCHC 33.8 31.4 - 35.0 g/dL    RDW 12.4 11.9 - 14.6 %    Platelets 998 358 - 633 K/uL    MPV 10.3 9.4 - 12.3 FL    nRBC 0.00 0.0 - 0.2 K/uL   Comprehensive Metabolic Panel    Collection Time: 02/21/23  5:44 AM   Result Value Ref Range    Sodium 135 133 - 143 mmol/L    Potassium 3.5 3.5 - 5.1 mmol/L    Chloride 105 101 - 110 mmol/L    CO2 24 21 - 32 mmol/L    Anion Gap 6 2 - 11 mmol/L    Glucose 116 (H) 65 - 100 mg/dL    BUN 6 6 - 23 MG/DL    Creatinine 0.50 (L) 0.8 - 1.5 MG/DL    Est, Glom Filt Rate >60 >60 ml/min/1.73m2    Calcium 8.6 8.3 - 10.4 MG/DL    Total Bilirubin 1.4 (H) 0.2 - 1.1 MG/DL    ALT 15 12 - 65 U/L    AST 14 (L) 15 - 37 U/L    Alk Phosphatase 81 50 - 136 U/L    Total Protein 6.4 6.3 - 8.2 g/dL    Albumin 2.4 (L) 3.5 - 5.0 g/dL    Globulin 4.0 2.8 - 4.5 g/dL    Albumin/Globulin Ratio 0.6 0.4 - 1.6     Lipase    Collection Time: 02/21/23  5:44 AM   Result Value Ref Range    Lipase 130 73 - 393 U/L       I have personally reviewed imaging studies:  Other Studies:  CT ABDOMEN PELVIS W IV CONTRAST Additional Contrast? None   Final Result   Addendum (preliminary) 2 of 2   ADDENDUM:       Case discussed with MD.       Axial image 29 appears to show fluid in the lumen of the gallbladder    communicating with fluid in the lumen of the colon. This is a subtle    finding;    artifact cannot be completely excluded. Coronal images 23 and 24 show extraluminal fluid density abutting the    superior    margin of the colon where it abuts the gallbladder. Fabiola Christopher M.D.    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 2. Biliary stent in good position. Fabiola Christopher M.D.    2/18/2023 8:58:00 PM          Current Meds:  Current Facility-Administered Medications   Medication Dose Route Frequency    HYDROmorphone (DILAUDID) injection 1 mg  1 mg IntraVENous Q4H PRN    HYDROmorphone (DILAUDID) injection 0.5 mg  0.5 mg IntraVENous Q4H PRN    dextrose 5 % and 0.45 % NaCl with KCl 20 mEq infusion   IntraVENous Continuous    sodium chloride flush 0.9 % injection 5-40 mL  5-40 mL IntraVENous PRN    0.9 % sodium chloride infusion   IntraVENous PRN    ondansetron (ZOFRAN-ODT) disintegrating tablet 4 mg  4 mg Oral Q8H PRN    Or    ondansetron (ZOFRAN) injection 4 mg  4 mg IntraVENous Q6H PRN    acetaminophen (TYLENOL) tablet 650 mg  650 mg Oral Q6H PRN    piperacillin-tazobactam (ZOSYN) 3,375 mg in sodium chloride 0.9 % 50 mL IVPB (mini-bag)  3,375 mg IntraVENous q8h    pantoprazole (PROTONIX) 40 mg in sodium chloride (PF) 0.9 % 10 mL injection  40 mg IntraVENous Daily       Signed:  Rasta Silva MD    Part of this note may have been written by using a voice dictation software. The note has been proof read but may still contain some grammatical/other typographical errors.

## 2023-02-22 PROBLEM — K85.90 POST-ERCP ACUTE PANCREATITIS: Status: ACTIVE | Noted: 2023-02-22

## 2023-02-22 PROBLEM — K91.89 POST-ERCP ACUTE PANCREATITIS: Status: ACTIVE | Noted: 2023-02-22

## 2023-02-22 PROBLEM — A41.9 SEPSIS (HCC): Status: RESOLVED | Noted: 2023-02-18 | Resolved: 2023-02-22

## 2023-02-22 LAB
ANION GAP SERPL CALC-SCNC: 5 MMOL/L (ref 2–11)
BUN SERPL-MCNC: 8 MG/DL (ref 6–23)
CALCIUM SERPL-MCNC: 8.7 MG/DL (ref 8.3–10.4)
CHLORIDE SERPL-SCNC: 107 MMOL/L (ref 101–110)
CO2 SERPL-SCNC: 26 MMOL/L (ref 21–32)
CREAT SERPL-MCNC: 0.6 MG/DL (ref 0.8–1.5)
GLUCOSE SERPL-MCNC: 125 MG/DL (ref 65–100)
MAGNESIUM SERPL-MCNC: 1.8 MG/DL (ref 1.8–2.4)
PHOSPHATE SERPL-MCNC: 3.2 MG/DL (ref 2.5–4.5)
POTASSIUM SERPL-SCNC: 3.5 MMOL/L (ref 3.5–5.1)
SODIUM SERPL-SCNC: 138 MMOL/L (ref 133–143)
TRIGL SERPL-MCNC: 100 MG/DL (ref 35–150)

## 2023-02-22 PROCEDURE — 6360000002 HC RX W HCPCS: Performed by: FAMILY MEDICINE

## 2023-02-22 PROCEDURE — 99232 SBSQ HOSP IP/OBS MODERATE 35: CPT | Performed by: SURGERY

## 2023-02-22 PROCEDURE — 84100 ASSAY OF PHOSPHORUS: CPT

## 2023-02-22 PROCEDURE — 6360000002 HC RX W HCPCS: Performed by: SURGERY

## 2023-02-22 PROCEDURE — 2500000003 HC RX 250 WO HCPCS: Performed by: FAMILY MEDICINE

## 2023-02-22 PROCEDURE — 80048 BASIC METABOLIC PNL TOTAL CA: CPT

## 2023-02-22 PROCEDURE — 2580000003 HC RX 258: Performed by: INTERNAL MEDICINE

## 2023-02-22 PROCEDURE — A4216 STERILE WATER/SALINE, 10 ML: HCPCS | Performed by: INTERNAL MEDICINE

## 2023-02-22 PROCEDURE — C9113 INJ PANTOPRAZOLE SODIUM, VIA: HCPCS | Performed by: INTERNAL MEDICINE

## 2023-02-22 PROCEDURE — 2580000003 HC RX 258: Performed by: SURGERY

## 2023-02-22 PROCEDURE — 6360000002 HC RX W HCPCS: Performed by: NURSE PRACTITIONER

## 2023-02-22 PROCEDURE — 1100000003 HC PRIVATE W/ TELEMETRY

## 2023-02-22 PROCEDURE — 84478 ASSAY OF TRIGLYCERIDES: CPT

## 2023-02-22 PROCEDURE — 6360000002 HC RX W HCPCS: Performed by: INTERNAL MEDICINE

## 2023-02-22 PROCEDURE — 83735 ASSAY OF MAGNESIUM: CPT

## 2023-02-22 PROCEDURE — 2580000003 HC RX 258: Performed by: FAMILY MEDICINE

## 2023-02-22 RX ORDER — POTASSIUM CHLORIDE 29.8 MG/ML
20 INJECTION INTRAVENOUS PRN
Status: DISCONTINUED | OUTPATIENT
Start: 2023-02-22 | End: 2023-02-28 | Stop reason: HOSPADM

## 2023-02-22 RX ORDER — MAGNESIUM SULFATE IN WATER 40 MG/ML
2000 INJECTION, SOLUTION INTRAVENOUS PRN
Status: DISCONTINUED | OUTPATIENT
Start: 2023-02-22 | End: 2023-02-28 | Stop reason: HOSPADM

## 2023-02-22 RX ORDER — POTASSIUM CHLORIDE 7.45 MG/ML
10 INJECTION INTRAVENOUS PRN
Status: DISCONTINUED | OUTPATIENT
Start: 2023-02-22 | End: 2023-02-28 | Stop reason: HOSPADM

## 2023-02-22 RX ORDER — DIPHENHYDRAMINE HYDROCHLORIDE 50 MG/ML
25 INJECTION INTRAMUSCULAR; INTRAVENOUS NIGHTLY PRN
Status: DISCONTINUED | OUTPATIENT
Start: 2023-02-22 | End: 2023-02-28 | Stop reason: HOSPADM

## 2023-02-22 RX ADMIN — PIPERACILLIN AND TAZOBACTAM 3375 MG: 3; .375 INJECTION, POWDER, LYOPHILIZED, FOR SOLUTION INTRAVENOUS at 11:46

## 2023-02-22 RX ADMIN — POTASSIUM CHLORIDE: 2 INJECTION, SOLUTION, CONCENTRATE INTRAVENOUS at 17:45

## 2023-02-22 RX ADMIN — HYDROMORPHONE HYDROCHLORIDE 0.5 MG: 1 INJECTION, SOLUTION INTRAMUSCULAR; INTRAVENOUS; SUBCUTANEOUS at 09:14

## 2023-02-22 RX ADMIN — SODIUM CHLORIDE, PRESERVATIVE FREE 10 ML: 5 INJECTION INTRAVENOUS at 21:00

## 2023-02-22 RX ADMIN — SODIUM CHLORIDE, PRESERVATIVE FREE 10 ML: 5 INJECTION INTRAVENOUS at 09:03

## 2023-02-22 RX ADMIN — PIPERACILLIN AND TAZOBACTAM 3375 MG: 3; .375 INJECTION, POWDER, LYOPHILIZED, FOR SOLUTION INTRAVENOUS at 04:57

## 2023-02-22 RX ADMIN — HYDROMORPHONE HYDROCHLORIDE 1 MG: 1 INJECTION, SOLUTION INTRAMUSCULAR; INTRAVENOUS; SUBCUTANEOUS at 02:26

## 2023-02-22 RX ADMIN — PIPERACILLIN AND TAZOBACTAM 3375 MG: 3; .375 INJECTION, POWDER, LYOPHILIZED, FOR SOLUTION INTRAVENOUS at 20:30

## 2023-02-22 RX ADMIN — PANTOPRAZOLE SODIUM 40 MG: 40 INJECTION, POWDER, LYOPHILIZED, FOR SOLUTION INTRAVENOUS at 09:00

## 2023-02-22 RX ADMIN — HYDROMORPHONE HYDROCHLORIDE 1 MG: 1 INJECTION, SOLUTION INTRAMUSCULAR; INTRAVENOUS; SUBCUTANEOUS at 23:41

## 2023-02-22 RX ADMIN — DIPHENHYDRAMINE HYDROCHLORIDE 25 MG: 50 INJECTION, SOLUTION INTRAMUSCULAR; INTRAVENOUS at 20:34

## 2023-02-22 RX ADMIN — SOYBEAN OIL 250 ML: 20 INJECTION, SOLUTION INTRAVENOUS at 17:45

## 2023-02-22 ASSESSMENT — PAIN DESCRIPTION - PAIN TYPE: TYPE: ACUTE PAIN

## 2023-02-22 ASSESSMENT — PAIN DESCRIPTION - DESCRIPTORS
DESCRIPTORS: ACHING;BURNING
DESCRIPTORS: ACHING;BURNING;PRESSURE
DESCRIPTORS: ACHING;BURNING

## 2023-02-22 ASSESSMENT — PAIN - FUNCTIONAL ASSESSMENT
PAIN_FUNCTIONAL_ASSESSMENT: ACTIVITIES ARE NOT PREVENTED

## 2023-02-22 ASSESSMENT — PAIN DESCRIPTION - ORIENTATION
ORIENTATION: MID;UPPER
ORIENTATION: MID
ORIENTATION: MID;INNER

## 2023-02-22 ASSESSMENT — PAIN DESCRIPTION - ONSET: ONSET: ON-GOING

## 2023-02-22 ASSESSMENT — PAIN SCALES - GENERAL
PAINLEVEL_OUTOF10: 7
PAINLEVEL_OUTOF10: 6
PAINLEVEL_OUTOF10: 8

## 2023-02-22 ASSESSMENT — PAIN DESCRIPTION - LOCATION
LOCATION: BACK
LOCATION: ABDOMEN;BACK
LOCATION: BACK

## 2023-02-22 ASSESSMENT — PAIN DESCRIPTION - FREQUENCY: FREQUENCY: INTERMITTENT

## 2023-02-22 NOTE — PROGRESS NOTES
H&P/Consult Note/Progress Note/Office Note:   Theresa Bain MRN: [id number]  :1965  Age:57 y.o.    HPI: Theresa Bain is a 61 y.o. male who was admitted by the hospitalist on 23 after he presented with epigastric pain. He reported severe nausea beginning in 2023 with p.o. intake  This was associated with upper abdominal discomfort and dark urine  He was prescribed Protonix by his primary care at first which did not help  He then had US below and was referred to GI    23 RUQ abd US ([place])  Liver: Intrahepatic biliary duct dilatation is present. No focal liver lesions visualized. Gallbladder: The gallbladder is distended and contains sludge. No pericholecystic fluid. No sonographic Pop sign. Common bile duct: Dilated to 10 mm. Pancreas: The body the pancreas is seen and there is pancreatic duct dilatation to 4 mm in the body the pancreas. The head and tail the pancreas are not adequately visualized by ultrasound. Right kidney: Normal.     IMPRESSION:     Common duct dilatation, intrahepatic biliary duct dilatation, and gallbladder distention likely represents biliary obstruction. Superimposed pancreatic duct dilatation is also present. This constellation of findings can be seen in the setting of a pancreatic head mass. The head of the pancreas is not adequately seen on today's exam.   I would recommend pancreatic mass protocol CT and gastroenterology consultation. 23 ERCP with stent; Dr Benson Pain:  Biliary stricture just proximal to ampulla. Could visualize this area post papillotomy. Sludge and cloudy bile extracted. Able to pass 12 mm balloon with resistance. Good drainage with stent. Pancreas- not able to cannulate. GB- ? Sludge vs small stones  Biggest concern would a small pancreatic head tumor          23 EUS; Dr Cherry Shields:   1.  Extensive pancreatic parenchymal abnormalities are consistent with chronic pancreatitis based on EUS criteria. It should be noted that the presence of chronic pancreatitis decreases the sensitivity of EUS for detection of pancreatic tumors. 2. 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 FNA was performed. 3. Pancreatic cyst. Morphologic features are most typical for pseudocyst. This is currently too small to warrant FNA. Soon after the EUS that he developed worsening abdominal pain and came to the ER. He denies prior abdominal surgery. He is not taking blood thinners. In ER on 2/19/23 wbc 15.8k, hgb 16.7, plt 306k  T bili 1.4, AST/ALT 12/42, alk [id number]  Lipase 1088  Gen Surgery was consulted after the below CT       2/18/23 CT abd/pelvis with IV contrast    Hx: upper abd pain s/p ERCP     Lower Chest: Scattered atelectasis in the lung bases. Liver: Normal.     Gallbladder/Billary: The gallbladder is inflamed. 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 A biliary stent is in good position. Pancreas: Normal.     Spleen: Normal.     Adrenal Glands: Normal.     Kidneys: Normal.     GI Tract: The stomach and duodenum are unremarkable. Normal appendix. Normal small bowel. Mesentery/Peritoneum: Hazy edema and inflammation in the upper central mesentery. No free intraperitoneal air. Vasculature: Normal.     Lymph Nodes: Normal.     Abdominal Wall: See musculoskeletal section. Bladder: Normal.     Reproductive: Normal.     Musculoskeletal: Normal.     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    2. Biliary stent in good position       Additional hx:  2/21/23 feels much better; still with some epigastric pain; ambulating in room            Past Medical History:   Diagnosis Date    GERD (gastroesophageal reflux disease)     medication    Sleep apnea     has a CPAP but does not use     Past Surgical History:   Procedure Laterality Date    COLONOSCOPY ERCP N/A 2/1/2023    ERCP SPHINCTER/PAPILLOTOMY/BALLOON SWEEP/STENT PLACEMENT performed by Edison Chand MD at [facility] ENDOSCOPY    ERCP W/ PLASTIC STENT PLACEMENT  02/01/2023    SINUS SURGERY      x3    VASECTOMY       Current Facility-Administered Medications   Medication Dose Route Frequency    PN-Adult Premix 4.25/5 - Peripheral Line   IntraVENous Continuous TPN    sodium chloride flush 0.9 % injection 5-40 mL  5-40 mL IntraVENous 2 times per day    sodium chloride flush 0.9 % injection 5-40 mL  5-40 mL IntraVENous PRN    0.9 % sodium chloride infusion  25 mL IntraVENous PRN    HYDROmorphone (DILAUDID) injection 1 mg  1 mg IntraVENous Q4H PRN    HYDROmorphone (DILAUDID) injection 0.5 mg  0.5 mg IntraVENous Q4H PRN    sodium chloride flush 0.9 % injection 5-40 mL  5-40 mL IntraVENous PRN    0.9 % sodium chloride infusion   IntraVENous PRN    ondansetron (ZOFRAN-ODT) disintegrating tablet 4 mg  4 mg Oral Q8H PRN    Or    ondansetron (ZOFRAN) injection 4 mg  4 mg IntraVENous Q6H PRN    acetaminophen (TYLENOL) tablet 650 mg  650 mg Oral Q6H PRN    piperacillin-tazobactam (ZOSYN) 3,375 mg in sodium chloride 0.9 % 50 mL IVPB (mini-bag)  3,375 mg IntraVENous q8h    pantoprazole (PROTONIX) 40 mg in sodium chloride (PF) 0.9 % 10 mL injection  40 mg IntraVENous Daily     ALLERGIES:  Patient has no known allergies. Social History     Socioeconomic History    Marital status:    Tobacco Use    Smoking status: Every Day     Packs/day: 0.50     Years: 40.00     Pack years: 20.00     Types: Cigarettes    Smokeless tobacco: Never   Vaping Use    Vaping Use: Never used   Substance and Sexual Activity    Alcohol use: Not Currently    Drug use: Not Currently     Social History     Tobacco Use   Smoking Status Every Day    Packs/day: 0.50    Years: 40.00    Pack years: 20.00    Types: Cigarettes   Smokeless Tobacco Never     No family history on file.   ROS: The patient has no difficulty with chest pain or shortness of breath. No fever or chills. Comprehensive review of systems was otherwise unremarkable except as noted above. Physical Exam:   /74   Pulse 79   Temp 98.1 °F (36.7 °C) (Oral)   Resp 18   Ht 6' (1.829 m)   Wt 182 lb 12.8 oz (82.9 kg)   SpO2 94%   BMI 24.79 kg/m²   Vitals:    02/21/23 0718 02/21/23 1051 02/21/23 1245 02/21/23 1456   BP: 121/74 119/71  122/74   Pulse: 65 78  79   Resp: 18 18  18   Temp: 97.9 °F (36.6 °C) 97.5 °F (36.4 °C)  98.1 °F (36.7 °C)   TempSrc: Oral Oral  Oral   SpO2: 93% 94%  94%   Weight:   182 lb 12.8 oz (82.9 kg)    Height:         No intake/output data recorded. 02/20 0701 - 02/21 1900  In: 8532 [I.V.:3051]  Out: 5113 [Urine:1750]    Constitutional: Alert, oriented, cooperative patient in no acute distress; appears stated age    Eyes:Sclera are clear. EOMs intact  ENMT: no external lesions gross hearing normal; no obvious neck masses, no ear or lip lesions, nares normal  CV: RRR. Normal perfusion  Resp: No JVD. Breathing is  non-labored; no audible wheezing. GI: soft and non-distended, epigastric pain is improving       Musculoskeletal: unremarkable with normal function. No embolic signs or cyanosis.    Neuro:  Oriented; moves all 4; no focal deficits  Psychiatric: normal affect and mood, no memory impairment    Recent vitals (if inpt):  Patient Vitals for the past 24 hrs:   BP Temp Temp src Pulse Resp SpO2 Weight   02/21/23 1456 122/74 98.1 °F (36.7 °C) Oral 79 18 94 % --   02/21/23 1245 -- -- -- -- -- -- 182 lb 12.8 oz (82.9 kg)   02/21/23 1051 119/71 97.5 °F (36.4 °C) Oral 78 18 94 % --   02/21/23 0718 121/74 97.9 °F (36.6 °C) Oral 65 18 93 % --   02/21/23 0413 110/72 99.1 °F (37.3 °C) Oral 72 19 93 % 194 lb 12.8 oz (88.4 kg)   02/20/23 2329 131/71 98.2 °F (36.8 °C) Oral 75 18 93 % --       Amount and/or Complexity of Data Reviewed and Analyzed:  I reviewed and analyzed all of the unique labs and radiologic studies that are shown below as well as any that are in the HPI, and any that are in the expanded problem list below  *Each unique test, order, or document contributes to the combination of 2 or combination of 3 in Category 1 below. For this visit I also reviewed old records and prior notes. Recent Labs     02/20/23  0646 02/21/23  0544 02/21/23  1456   WBC 9.1  --   --    HGB 13.5*  --   --      --   --     135  --    K 3.5 3.5  --     105  --    CO2 23 24  --    BUN 8 6  --    ALT 12 15 19     Review of most recent CBC  Lab Results   Component Value Date    WBC 9.1 02/20/2023    HGB 13.5 (L) 02/20/2023    HCT 39.9 (L) 02/20/2023    MCV 92.8 02/20/2023     02/20/2023       Review of most recent BMP  Lab Results   Component Value Date/Time     02/21/2023 05:44 AM    K 3.5 02/21/2023 05:44 AM     02/21/2023 05:44 AM    CO2 24 02/21/2023 05:44 AM    BUN 6 02/21/2023 05:44 AM    CREATININE 0.50 02/21/2023 05:44 AM    GLUCOSE 116 02/21/2023 05:44 AM    CALCIUM 8.6 02/21/2023 05:44 AM        Review of most recent LFTs (and lipase if done)  Lab Results   Component Value Date    ALT 19 02/21/2023    AST 14 (L) 02/21/2023    ALKPHOS 80 02/21/2023    BILITOT 1.1 02/21/2023     Lab Results   Component Value Date    LIPASE 130 02/21/2023       No results found for: INR, APTT, LCAD    Review of most recent HgbA1c  No results found for: LABA1C    Nutritional assessment screen for wound healing issues:  Lab Results   Component Value Date    LABALBU 2.6 (L) 02/21/2023       @lastcovr@    Xray Result (most recent):  No results found for this or any previous visit from the past 3650 days. CT Result (most recent):  CT ABDOMEN PELVIS W IV CONTRAST 02/18/2023    Addendum 2/18/2023 10:25 PM  ADDENDUM:    Case discussed with MD.    Axial image 29 appears to show fluid in the lumen of the gallbladder  communicating with fluid in the lumen of the colon. This is a subtle finding;  artifact cannot be completely excluded.     Coronal images 23 and 24 show extraluminal fluid density abutting the superior  margin of the colon where it abuts the gallbladder. Stantonjose juan Miranda M.D.  2/18/2023 10:25:00 PM    Addendum 2/18/2023  9:07 PM  ADDENDUM:    [address]    Dylan GEMA Miranda  2/18/2023 9:07:00 PM    Narrative  EXAMINATION: CT SCAN OF THE ABDOMEN AND PELVIS WITH INTRAVENOUS CONTRAST    DATE OF EXAM: 2/18/2023 5:15 PM    HISTORY: upper abd pain s/p ERCP    COMPARISON: None. TECHNIQUE: CT examination of the abdomen and pelvis was performed following the  intravenous administration of 100 mL of Isovue-370. CT dose lowering techniques  were used, to include: automated exposure control, adjustment for patient size,  and/or use of iterative reconstruction. FINDINGS:    ABDOMEN/PELVIS:    Lower Chest: Scattered atelectasis in the lung bases. Liver: Normal.    Gallbladder/Billary: The gallbladder is inflamed. 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 A biliary  stent is in good position. Pancreas: Normal.    Spleen: Normal.    Adrenal Glands: Normal.    Kidneys: Normal.    GI Tract: The stomach and duodenum are unremarkable. Normal appendix. Normal  small bowel. Mesentery/Peritoneum: Hazy edema and inflammation in the upper central  mesentery. No free intraperitoneal air. Vasculature: Normal.    Lymph Nodes: Normal.    Abdominal Wall: See musculoskeletal section. Bladder: Normal.    Reproductive: Normal.    Musculoskeletal: Normal.    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 2. Biliary stent in good position. Dylanjose juan Miranda M.D.  2/18/2023 8:58:00 PM    US Result (most recent):  No results found for this or any previous visit from the past 3650 days.       Admission date (for inpatients): 2/18/2023   * No surgery found *  * No surgery found *        ASSESSMENT/PLAN:  [unfilled]  Principal Problem:    Sepsis ([address]  Active Problems:    Biliary stricture Abdominal pain    Hyponatremia    Tobacco use    Pancreatitis  Resolved Problems:    * No resolved hospital problems. *     Patient Active Problem List    Diagnosis Date Noted    Sepsis ([facility] [address] 02/18/2023     Priority: Medium    Biliary stricture 02/18/2023     Priority: Medium    Abdominal pain 02/18/2023     Priority: Medium    Hyponatremia 02/18/2023     Priority: Medium    Tobacco use 02/18/2023     Priority: Medium    Pancreatitis 02/18/2023     Priority: Medium          Number and Complexity of Problems addressed and   Risks of complications and/or morbidity of management            Acute Pancreatitis after EUS and FNA of pancreas  Continue NPO  Start PPN  Morphine ordered for pain  FNA biopsy showed no evidence of malignancy  Etiology of distal CBD stricture most likely multifactorial recurrent pancreatitis (alcohol, gallstones/CBD stone)        Abnormal GB on CT  Possible cholecysto-colic fistula vs air in GB after ERCP and stent with surrounding pancreatic edema  It appears from the ERCP images that the cystic duct was patent at that time  IV Zosyn  Follow  No plans for surgery at this time  Await resolution of acute pancreatitis as first step  Will repeat CT scan soon              Level of MDM (2/3 elements below)  Number and Complexity of Problems Addressed Amount and/or Complexity of Data to be Reviewed and Analyzed  *Each unique test, order, or document contributes to the combination of 2 or combination of 3 in Category 1 below. Risk of Complications and/or Morbidity or Mortality of pt Management     09361  99232 SF Minimal  ?1self-limited or minor problem Minimal or none Minimal risk of morbidity from additional diagnostic testing or Rx   67256  74406 Low Low  ? 2or more self-limited or minor problems;    or  ? 1stable chronic illness;    or  ?1acute, uncomplicated illness or injury   Limited  (Must meet the requirements of at least 1 of the 2 categories)  Category 1: Tests and documents   ? Any combination of 2 from the following:  ?Review of prior external note(s) from each unique source*;  ?review of the result(s) of each unique test*;   ?ordering of each unique test*    or   Category 2: Assessment requiring an independent historian(s)  (For the categories of independent interpretation of tests and discussion of management or test interpretation, see moderate or high) Low risk of morbidity from additional diagnostic testing or treatment     71425  72724 Mod Moderate  ? 1or more chronic illnesses with exacerbation, progression, or side effects of treatment;    or  ?2or more stable chronic illnesses;    or  ?1undiagnosed new problem with uncertain prognosis;    or  ?1acute illness with systemic symptoms;    or  ?1acute complicated injury   Moderate  (Must meet the requirements of at least 1 out of 3 categories)  Category 1: Tests, documents, or independent historian(s)  ? Any combination of 3 from the following:   ?Review of prior external note(s) from each unique source*;  ?Review of the result(s) of each unique test*;  ?Ordering of each unique test*;  ?Assessment requiring an independent historian(s)    or  Category 2: Independent interpretation of tests   ? Independent interpretation of a test performed by another physician/other qualified health care professional (not separately reported);     or  Category 3: Discussion of management or test interpretation  ? Discussion of management or test interpretation with external physician/other qualified health care professional/appropriate source (not separately reported)   Moderate risk of morbidity from additional diagnostic testing or treatment  Examples only:  ?Prescription drug management   ? Decision regarding minor surgery with identified patient or procedure risk factors  ? Decision regarding elective major surgery without identified patient or procedure risk factors   ? Diagnosis or treatment significantly limited by social determinants of health       [id number] [place] High  ?1or more chronic illnesses with severe exacerbation, progression, or side effects of treatment;    or  ?1 acute or chronic illness or injury that poses a threat to life or bodily function   Extensive  (Must meet the requirements of at least 2 out of 3 categories)  Category 1: Tests, documents, or independent historian(s)  ? Any combination of 3 from the following:   ?Review of prior external note(s) from each unique source*;  ?Review of the result(s) of each unique test*;   ?Ordering of each unique test*;   ?Assessment requiring an independent historian(s)    or   Category 2: Independent interpretation of tests   ? Independent interpretation of a test performed by another physician/other qualified health care professional (not separately reported);     or  Category 3: Discussion of management or test interpretation  ? Discussion of management or test interpretation with external physician/other qualified health care professional/appropriate source (not separately reported)   High risk of morbidity from additional diagnostic testing or treatment  Examples only:  ?Drug therapy requiring intensive monitoring for toxicity  ? Decision regarding elective major surgery with identified patient or procedure risk factors  ? Decision regarding emergency major surgery  ? Decision regarding hospitalization  ? Decision not to resuscitate or to de-escalate care because of poor prognosis      Parenteral controlled substances             I have personally performed a face-to-face diagnostic evaluation and management  service on this patient. I have independently seen the patient. I have independently obtained the above history from the patient/family. I have independently examined the patient with above findings. I have independently reviewed data/labs for this patient and developed the above plan of care (MDM).       Signed: Della Escalona MD  2/21/2023    7:31 PM

## 2023-02-22 NOTE — PROGRESS NOTES
END OF SHIFT NOTE:    INTAKE/OUTPUT  02/21 0701 - 02/22 0700  In: 2580.1 [I.V.:2580.1]  Out: 1200 [Urine:1200]  Voiding: Yes  Catheter: No  Drain:              Flatus: Patient does have flatus present. Stool:  occurrences. Characteristics:  Stool Appearance:  (No stool to assess this AM)  Stool Color: Unable to assess  Stool Amount: Unable to assess  Stool Assessment  Stool Appearance:  (No stool to assess this AM)  Stool Color: Unable to assess  Stool Amount: Unable to assess  Last BM (including prior to admit): 02/16/23 (per paitent)    Emesis:  occurrences. Characteristics:        VITAL SIGNS  Patient Vitals for the past 12 hrs:   Temp Pulse Resp BP SpO2   02/22/23 0304 98.2 °F (36.8 °C) 64 18 110/68 93 %   02/22/23 0006 98.2 °F (36.8 °C) 68 18 121/74 92 %   02/21/23 1944 98.1 °F (36.7 °C) 69 18 135/84 94 %       Pain Assessment  Pain Level: 7 (02/22/23 0226)  Pain Location: Abdomen, Back  Patient's Stated Pain Goal: 0 - No pain    Ambulating  Yes    Shift report given to oncoming nurse at the bedside.     Nguyen Matute RN

## 2023-02-22 NOTE — PROGRESS NOTES
H&P/Consult Note/Progress Note/Office Note:   Sylvia Clark MRN: [id number]  :1965  Age:57 y.o.    HPI: Sylvia Clark is a 61 y.o. male who was admitted by the hospitalist on 23 after he presented with epigastric pain. He reported severe nausea beginning in 2023 with p.o. intake  This was associated with upper abdominal discomfort and dark urine  He was prescribed Protonix by his primary care at first which did not help  He then had US below and was referred to GI    23 RUQ [facility] US ([facility])  Liver: Intrahepatic biliary duct dilatation is present. No focal liver lesions visualized. Gallbladder: The gallbladder is distended and contains sludge. No pericholecystic fluid. No sonographic Pop sign. Common bile duct: Dilated to 10 mm. Pancreas: The body the pancreas is seen and there is pancreatic duct dilatation to 4 mm in the body the pancreas. The head and tail the pancreas are not adequately visualized by ultrasound. Right kidney: Normal.     IMPRESSION:     Common duct dilatation, intrahepatic biliary duct dilatation, and gallbladder distention likely represents biliary obstruction. Superimposed pancreatic duct dilatation is also present. This constellation of findings can be seen in the setting of a pancreatic head mass. The head of the pancreas is not adequately seen on today's exam.   I would recommend pancreatic mass protocol CT and gastroenterology consultation. 23 ERCP with stent; Dr Ian Son:  Biliary stricture just proximal to ampulla. Could visualize this area post papillotomy. Sludge and cloudy bile extracted. Able to pass 12 mm balloon with resistance. Good drainage with stent. Pancreas- not able to cannulate. GB- ? Sludge vs small stones  Biggest concern would a small pancreatic head tumor          23 EUS; Dr Rose Marie Donohue:   1.  Extensive pancreatic parenchymal abnormalities are consistent with chronic pancreatitis based on EUS criteria. It should be noted that the presence of chronic pancreatitis decreases the sensitivity of EUS for detection of pancreatic tumors. 2. 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 FNA was performed. 3. Pancreatic cyst. Morphologic features are most typical for pseudocyst. This is currently too small to warrant FNA. Soon after the EUS that he developed worsening abdominal pain and came to the ER. He denies prior abdominal surgery. He is not taking blood thinners. In ER on 2/19/23 wbc 15.8k, hgb 16.7, plt 306k  T bili 1.4, AST/ALT 12/42, alk [id number]  Lipase 1088  Gen Surgery was consulted after the below CT       2/18/23 CT abd/pelvis with IV contrast    Hx: upper abd pain s/p ERCP     Lower Chest: Scattered atelectasis in the lung bases. Liver: Normal.     Gallbladder/Billary: The gallbladder is inflamed. 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 A biliary stent is in good position. Pancreas: Normal.     Spleen: Normal.     Adrenal Glands: Normal.     Kidneys: Normal.     GI Tract: The stomach and duodenum are unremarkable. Normal appendix. Normal small bowel. Mesentery/Peritoneum: Hazy edema and inflammation in the upper central mesentery. No free intraperitoneal air. Vasculature: Normal.     Lymph Nodes: Normal.     Abdominal Wall: See musculoskeletal section. Bladder: Normal.     Reproductive: Normal.     Musculoskeletal: Normal.     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    2. Biliary stent in good position       Additional hx:  2/21/23 feels much better; still with some epigastric pain; ambulating in room            Past Medical History:   Diagnosis Date    GERD (gastroesophageal reflux disease)     medication    Sleep apnea     has a CPAP but does not use     Past Surgical History:   Procedure Laterality Date    COLONOSCOPY ERCP N/A 2/1/2023    ERCP SPHINCTER/PAPILLOTOMY/BALLOON SWEEP/STENT PLACEMENT performed by Belen Saleh MD at [facility] ENDOSCOPY    ERCP W/ PLASTIC STENT PLACEMENT  02/01/2023    SINUS SURGERY      x3    VASECTOMY       Current Facility-Administered Medications   Medication Dose Route Frequency    PN-Adult Premix 5/15 - Central   IntraVENous Continuous TPN    fat emulsion 20 % infusion 250 mL  250 mL IntraVENous Once per day on Mon Wed Fri    potassium chloride 20 mEq/50 mL IVPB (Central Line)  20 mEq IntraVENous PRN    Or    potassium chloride 10 mEq/100 mL IVPB (Peripheral Line)  10 mEq IntraVENous PRN    magnesium sulfate 2000 mg in 50 mL IVPB premix  2,000 mg IntraVENous PRN    sodium phosphate 10 mmol in sodium chloride 0.9 % 250 mL IVPB  10 mmol IntraVENous PRN    Or    sodium phosphate 15 mmol in sodium chloride 0.9 % 250 mL IVPB  15 mmol IntraVENous PRN    Or    sodium phosphate 20 mmol in sodium chloride 0.9 % 500 mL IVPB  20 mmol IntraVENous PRN    diphenhydrAMINE (BENADRYL) injection 25 mg  25 mg IntraVENous Nightly PRN    PN-Adult Premix 4.25/5 - Peripheral Line   IntraVENous Continuous TPN    sodium chloride flush 0.9 % injection 5-40 mL  5-40 mL IntraVENous 2 times per day    sodium chloride flush 0.9 % injection 5-40 mL  5-40 mL IntraVENous PRN    0.9 % sodium chloride infusion  25 mL IntraVENous PRN    HYDROmorphone (DILAUDID) injection 1 mg  1 mg IntraVENous Q4H PRN    HYDROmorphone (DILAUDID) injection 0.5 mg  0.5 mg IntraVENous Q4H PRN    sodium chloride flush 0.9 % injection 5-40 mL  5-40 mL IntraVENous PRN    0.9 % sodium chloride infusion   IntraVENous PRN    ondansetron (ZOFRAN-ODT) disintegrating tablet 4 mg  4 mg Oral Q8H PRN    Or    ondansetron (ZOFRAN) injection 4 mg  4 mg IntraVENous Q6H PRN    acetaminophen (TYLENOL) tablet 650 mg  650 mg Oral Q6H PRN    piperacillin-tazobactam (ZOSYN) 3,375 mg in sodium chloride 0.9 % 50 mL IVPB (mini-bag)  3,375 mg IntraVENous q8h    pantoprazole (PROTONIX) 40 mg in sodium chloride (PF) 0.9 % 10 mL injection  40 mg IntraVENous Daily     ALLERGIES:  Patient has no known allergies. Social History     Socioeconomic History    Marital status:    Tobacco Use    Smoking status: Every Day     Packs/day: 0.50     Years: 40.00     Pack years: 20.00     Types: Cigarettes    Smokeless tobacco: Never   Vaping Use    Vaping Use: Never used   Substance and Sexual Activity    Alcohol use: Not Currently    Drug use: Not Currently     Social History     Tobacco Use   Smoking Status Every Day    Packs/day: 0.50    Years: 40.00    Pack years: 20.00    Types: Cigarettes   Smokeless Tobacco Never     No family history on file. ROS: The patient has no difficulty with chest pain or shortness of breath. No fever or chills. Comprehensive review of systems was otherwise unremarkable except as noted above. Physical Exam:   /79   Pulse 64   Temp 97.3 °F (36.3 °C) (Oral)   Resp 18   Ht 6' (1.829 m)   Wt 183 lb (83 kg)   SpO2 92%   BMI 24.82 kg/m²   Vitals:    02/22/23 0304 02/22/23 0631 02/22/23 0739 02/22/23 1152   BP: 110/68  133/75 117/79   Pulse: 64  58 64   Resp: 18  18 18   Temp: 98.2 °F (36.8 °C)  97.7 °F (36.5 °C) 97.3 °F (36.3 °C)   TempSrc: Oral  Oral Oral   SpO2: 93%  92%    Weight:  183 lb (83 kg)     Height:         02/22 0701 - 02/22 1900  In: -   Out: 250 [Urine:250]  02/20 1901 - 02/22 0700  In: 4592.1 [I.V.:4592.1]  Out: 2000 [Urine:2000]    Constitutional: Alert, oriented, cooperative patient in no acute distress; appears stated age    Eyes:Sclera are clear. EOMs intact  ENMT: no external lesions gross hearing normal; no obvious neck masses, no ear or lip lesions, nares normal  CV: RRR. Normal perfusion  Resp: No JVD. Breathing is  non-labored; no audible wheezing. GI: soft and non-distended, epigastric pain is improving       Musculoskeletal: unremarkable with normal function. No embolic signs or cyanosis.    Neuro:  Oriented; moves all 4; no focal deficits  Psychiatric: normal affect and mood, no memory impairment    Recent vitals (if inpt):  Patient Vitals for the past 24 hrs:   BP Temp Temp src Pulse Resp SpO2 Weight   02/22/23 1152 117/79 97.3 °F (36.3 °C) Oral 64 18 -- --   02/22/23 0739 133/75 97.7 °F (36.5 °C) Oral 58 18 92 % --   02/22/23 0631 -- -- -- -- -- -- 183 lb (83 kg)   02/22/23 0304 110/68 98.2 °F (36.8 °C) Oral 64 18 93 % --   02/22/23 0006 121/74 98.2 °F (36.8 °C) Oral 68 18 92 % --   02/21/23 1944 135/84 98.1 °F (36.7 °C) Oral 69 18 94 % --   02/21/23 1456 122/74 98.1 °F (36.7 °C) Oral 79 18 94 % --       Amount and/or Complexity of Data Reviewed and Analyzed:  I reviewed and analyzed all of the unique labs and radiologic studies that are shown below as well as any that are in the HPI, and any that are in the expanded problem list below  *Each unique test, order, or document contributes to the combination of 2 or combination of 3 in Category 1 below. For this visit I also reviewed old records and prior notes. Recent Labs     02/20/23  0646 02/21/23  0544 02/21/23  1456 02/22/23  0455   WBC 9.1  --   --   --    HGB 13.5*  --   --   --      --   --   --       < >  --  138   K 3.5   < >  --  3.5      < >  --  107   CO2 23   < >  --  26   BUN 8   < >  --  8   ALT 12   < > 19  --     < > = values in this interval not displayed.      Review of most recent CBC  Lab Results   Component Value Date    WBC 9.1 02/20/2023    HGB 13.5 (L) 02/20/2023    HCT 39.9 (L) 02/20/2023    MCV 92.8 02/20/2023     02/20/2023       Review of most recent BMP  Lab Results   Component Value Date/Time     02/22/2023 04:55 AM    K 3.5 02/22/2023 04:55 AM     02/22/2023 04:55 AM    CO2 26 02/22/2023 04:55 AM    BUN 8 02/22/2023 04:55 AM    CREATININE 0.60 02/22/2023 04:55 AM    GLUCOSE 125 02/22/2023 04:55 AM    CALCIUM 8.7 02/22/2023 04:55 AM        Review of most recent LFTs (and lipase if done)  Lab Results   Component Value Date    ALT 19 02/21/2023    AST 14 (L) 02/21/2023    ALKPHOS 80 02/21/2023    BILITOT 1.1 02/21/2023     Lab Results   Component Value Date    LIPASE 130 02/21/2023       No results found for: INR, APTT, LCAD    Review of most recent HgbA1c  No results found for: LABA1C    Nutritional assessment screen for wound healing issues:  Lab Results   Component Value Date    LABALBU 2.6 (L) 02/21/2023       @lastcovr@    Xray Result (most recent):  No results found for this or any previous visit from the past 3650 days.    CT Result (most recent):  CT ABDOMEN PELVIS W IV CONTRAST 02/18/2023    Addendum 2/18/2023 10:25 PM  ADDENDUM:    Case discussed with MD.    Axial image 29 appears to show fluid in the lumen of the gallbladder  communicating with fluid in the lumen of the colon. This is a subtle finding;  artifact cannot be completely excluded.    Coronal images 23 and 24 show extraluminal fluid density abutting the superior  margin of the colon where it abuts the gallbladder.    Mychal Blankenship M.D.  2/18/2023 10:25:00 PM    Addendum 2/18/2023  9:07 PM  ADDENDUM:    Carlos [address]    Mychal Blankenship M.D.  2/18/2023 9:07:00 PM    Narrative  EXAMINATION: CT SCAN OF THE ABDOMEN AND PELVIS WITH INTRAVENOUS CONTRAST    DATE OF EXAM: 2/18/2023 5:15 PM    HISTORY: upper abd pain s/p ERCP    COMPARISON: None.    TECHNIQUE: CT examination of the abdomen and pelvis was performed following the  intravenous administration of 100 mL of Isovue-370. CT dose lowering techniques  were used, to include: automated exposure control, adjustment for patient size,  and/or use of iterative reconstruction.    FINDINGS:    ABDOMEN/PELVIS:    Lower Chest: Scattered atelectasis in the lung bases.    Liver: Normal.    Gallbladder/Billary: The gallbladder is inflamed. 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 A biliary  stent is in good position.    Pancreas: Normal.    Spleen:  Normal.    Adrenal Glands: Normal.    Kidneys: Normal.    GI Tract: The stomach and duodenum are unremarkable. Normal appendix. Normal  small bowel.    Mesentery/Peritoneum: Hazy edema and inflammation in the upper central  mesentery. No free intraperitoneal air.    Vasculature: Normal.    Lymph Nodes: Normal.    Abdominal Wall: See musculoskeletal section.    Bladder: Normal.    Reproductive: Normal.    Musculoskeletal: Normal.    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  2. Biliary stent in good position.      Mychal Blankenship M.D.  2/18/2023 8:58:00 PM    US Result (most recent):  No results found for this or any previous visit from the past 3650 days.      Admission date (for inpatients): 2/18/2023   * No surgery found *  * No surgery found *        ASSESSMENT/PLAN:  [unfilled]  Principal Problem:    Sepsis (HCC)  Active Problems:    Biliary stricture    Abdominal pain    Hyponatremia    Tobacco use    Pancreatitis  Resolved Problems:    * No resolved hospital problems. *     Patient Active Problem List    Diagnosis Date Noted    Sepsis (HCC) 02/18/2023     Priority: Medium    Biliary stricture 02/18/2023     Priority: Medium    Abdominal pain 02/18/2023     Priority: Medium    Hyponatremia 02/18/2023     Priority: Medium    Tobacco use 02/18/2023     Priority: Medium    Pancreatitis 02/18/2023     Priority: Medium          Number and Complexity of Problems addressed and   Risks of complications and/or morbidity of management            Acute Pancreatitis after EUS and FNA of pancreas  Continue NPO  PPN via PICC   Morphine ordered for pain  FNA biopsy showed no evidence of malignancy  Etiology of distal CBD stricture most likely multifactorial recurrent pancreatitis (alcohol, gallstones/CBD stone)    Transfer to surgical service if OK with Hospitalists      Abnormal GB on CT  Possible cholecysto-colic fistula vs air in GB after ERCP and stent with surrounding pancreatic edema  It  appears from the ERCP images that the cystic duct was patent at that time  IV Zosyn  Follow  No plans for surgery at this time  Await resolution of acute pancreatitis as first step  Will repeat CT scan soon              Level of MDM (2/3 elements below)  Number and Complexity of Problems Addressed Amount and/or Complexity of Data to be Reviewed and Analyzed  *Each unique test, order, or document contributes to the combination of 2 or combination of 3 in Category 1 below. Risk of Complications and/or Morbidity or Mortality of pt Management     54933  36113 SF Minimal  ?1self-limited or minor problem Minimal or none Minimal risk of morbidity from additional diagnostic testing or Rx   21515  55129 Low Low  ? 2or more self-limited or minor problems;    or  ? 1stable chronic illness;    or  ?1acute, uncomplicated illness or injury   Limited  (Must meet the requirements of at least 1 of the 2 categories)  Category 1: Tests and documents   ? Any combination of 2 from the following:  ?Review of prior external note(s) from each unique source*;  ?review of the result(s) of each unique test*;   ?ordering of each unique test*    or   Category 2: Assessment requiring an independent historian(s)  (For the categories of independent interpretation of tests and discussion of management or test interpretation, see moderate or high) Low risk of morbidity from additional diagnostic testing or treatment     53401  96157 Mod Moderate  ? 1or more chronic illnesses with exacerbation, progression, or side effects of treatment;    or  ?2or more stable chronic illnesses;    or  ?1undiagnosed new problem with uncertain prognosis;    or  ?1acute illness with systemic symptoms;    or  ?1acute complicated injury   Moderate  (Must meet the requirements of at least 1 out of 3 categories)  Category 1: Tests, documents, or independent historian(s)  ? Any combination of 3 from the following:   ?Review of prior external note(s) from each unique source*;  ?Review of the result(s) of each unique test*;  ?Ordering of each unique test*;  ?Assessment requiring an independent historian(s)    or  Category 2: Independent interpretation of tests   ? Independent interpretation of a test performed by another physician/other qualified health care professional (not separately reported);     or  Category 3: Discussion of management or test interpretation  ? Discussion of management or test interpretation with external physician/other qualified health care professional/appropriate source (not separately reported)   Moderate risk of morbidity from additional diagnostic testing or treatment  Examples only:  ?Prescription drug management   ? Decision regarding minor surgery with identified patient or procedure risk factors  ? Decision regarding elective major surgery without identified patient or procedure risk factors   ? Diagnosis or treatment significantly limited by social determinants of health       49972  33580 High High  ? 1or more chronic illnesses with severe exacerbation, progression, or side effects of treatment;    or  ?1 acute or chronic illness or injury that poses a threat to life or bodily function   Extensive  (Must meet the requirements of at least 2 out of 3 categories)  Category 1: Tests, documents, or independent historian(s)  ? Any combination of 3 from the following:   ?Review of prior external note(s) from each unique source*;  ?Review of the result(s) of each unique test*;   ?Ordering of each unique test*;   ?Assessment requiring an independent historian(s)    or   Category 2: Independent interpretation of tests   ? Independent interpretation of a test performed by another physician/other qualified health care professional (not separately reported);     or  Category 3: Discussion of management or test interpretation  ? Discussion of management or test interpretation with external physician/other qualified health care professional/appropriate source (not separately reported)   High risk of morbidity from additional diagnostic testing or treatment  Examples only:  ?Drug therapy requiring intensive monitoring for toxicity  ? Decision regarding elective major surgery with identified patient or procedure risk factors  ? Decision regarding emergency major surgery  ? Decision regarding hospitalization  ? Decision not to resuscitate or to de-escalate care because of poor prognosis      Parenteral controlled substances             I have personally performed a face-to-face diagnostic evaluation and management  service on this patient. I have independently seen the patient. I have independently obtained the above history from the patient/family. I have independently examined the patient with above findings. I have independently reviewed data/labs for this patient and developed the above plan of care (MDM).       Signed: Anh Rubio MD  2/22/2023    2:27 PM

## 2023-02-22 NOTE — PROGRESS NOTES
Hospitalist Progress Note   Admit Date:  2023  5:20 PM   Name:  Rm Eugene. Age:  61 y.o. Sex:  male  :  1965   MRN:  [id number]   Room:      Presenting Complaint: Abdominal Pain     Reason(s) for Admission: Abdominal pain, epigastric [R10.13]  Cholecystitis [K81.9]  Sepsis ([address] [A41.9]  Acute biliary pancreatitis, unspecified complication status [Y19.78]     Hospital Course:   Rm Roblero is a 61 y.o. CM with a PMH of tobacco use, recent GI complaints followed by GI s/p ERCP with biliary stent on 23, EUS w/ FNA 23 who admitted with post ERCP pancreatitis. FNA biopsy negative for malignancy. CT a/p indicated a possible choledochocolonic fistula. Started on supportive care and empiric antibiotics. GI and surgery consulted. Patient is strict n.p.o.  Nutrition consulted and started on TPN . Subjective & 24hr Events (23): Patient is seen at the bedside. Reports overall feeling better. Abdominal pain improving. Denies chest pain, palpitation, nausea, vomiting. Flatus positive, had bowel movement yesterday. Assessment & Plan:     Post ERCP pancreatitis   Met SIRS criteria on admission likely in the setting of pancreatitis  Continue supportive care  On empiric antibiotic  Blood cultures negative to date  Pain control  GI signed off  Surgery recommend strict n.p.o.  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  Started on TPN , nutrition following  Plan for repeat CT tomorrow    Hyponatremia  Resolved    PT/OT evals and PPD needed/ordered?   No    Diet:  Diet NPO Exceptions are: Ice Chips  PN-Adult Premix 4.25/5 - Peripheral Line  PN-Adult Premix 5/15 - Central  VTE prophylaxis: SCD's   Code status: Full Code    Hospital Problems:  Principal Problem:    Post-ERCP acute pancreatitis  Active Problems:    Biliary stricture    Abdominal pain    Hyponatremia    Tobacco use    Pancreatitis  Resolved Problems:    Sepsis ([address]      Objective:   Patient Vitals for the past 24 hrs:   Temp Pulse Resp BP SpO2   02/22/23 1152 97.3 °F (36.3 °C) 64 18 117/79 --   02/22/23 0739 97.7 °F (36.5 °C) 58 18 133/75 92 %   02/22/23 0304 98.2 °F (36.8 °C) 64 18 110/68 93 %   02/22/23 0006 98.2 °F (36.8 °C) 68 18 121/74 92 %   02/21/23 1944 98.1 °F (36.7 °C) 69 18 135/84 94 %   02/21/23 1456 98.1 °F (36.7 °C) 79 18 122/74 94 %       Oxygen Therapy  SpO2: 92 %  Pulse Oximetry Type: Intermittent  O2 Device: None (Room air)    Estimated body mass index is 24.82 kg/m² as calculated from the following:    Height as of this encounter: 6' (1.829 m). Weight as of this encounter: 183 lb (83 kg). Intake/Output Summary (Last 24 hours) at 2/22/2023 1434  Last data filed at 2/22/2023 0736  Gross per 24 hour   Intake 2580.07 ml   Output 1450 ml   Net 1130.07 ml         Physical Exam:   Blood pressure 117/79, pulse 64, temperature 97.3 °F (36.3 °C), temperature source Oral, resp. rate 18, height 6' (1.829 m), weight 183 lb (83 kg), SpO2 92 %. General:    No cardiopulmonary distress  Head:  Normocephalic, atraumatic  Eyes:  Sclerae appear normal.  Pupils equally round. ENT:  Nares appear normal.  Moist oral mucosa  Neck:  No restricted ROM. Trachea midline   CV:   RRR. No m/r/g. Lungs: No wheezing. On room air. Abdomen:   Mild tender to palpate, nondistended. Extremities: No cyanosis or clubbing. Skin:     No rashes and normal coloration. Neuro:  CN II-XII grossly intact. Psych:  Normal mood and affect.       I have personally reviewed labs and tests:  Recent Labs:  Recent Results (from the past 48 hour(s))   Comprehensive Metabolic Panel    Collection Time: 02/21/23  5:44 AM   Result Value Ref Range    Sodium 135 133 - 143 mmol/L Potassium 3.5 3.5 - 5.1 mmol/L    Chloride 105 101 - 110 mmol/L    CO2 24 21 - 32 mmol/L    Anion Gap 6 2 - 11 mmol/L    Glucose 116 (H) 65 - 100 mg/dL    BUN 6 6 - 23 MG/DL    Creatinine 0.50 (L) 0.8 - 1.5 MG/DL    Est, Glom Filt Rate >60 >60 ml/min/1.73m2    Calcium 8.6 8.3 - 10.4 MG/DL    Total Bilirubin 1.4 (H) 0.2 - 1.1 MG/DL    ALT 15 12 - 65 U/L    AST 14 (L) 15 - 37 U/L    Alk Phosphatase 81 50 - 136 U/L    Total Protein 6.4 6.3 - 8.2 g/dL    Albumin 2.4 (L) 3.5 - 5.0 g/dL    Globulin 4.0 2.8 - 4.5 g/dL    Albumin/Globulin Ratio 0.6 0.4 - 1.6     Lipase    Collection Time: 02/21/23  5:44 AM   Result Value Ref Range    Lipase 130 73 - 393 U/L   Hepatic function panel    Collection Time: 02/21/23  2:56 PM   Result Value Ref Range    Total Protein 6.6 6.3 - 8.2 g/dL    Albumin 2.6 (L) 3.5 - 5.0 g/dL    Globulin 4.0 2.8 - 4.5 g/dL    Albumin/Globulin Ratio 0.7 0.4 - 1.6      Total Bilirubin 1.1 0.2 - 1.1 MG/DL    Bilirubin, Direct 0.4 (H) <0.4 MG/DL    Alk Phosphatase 80 50 - 136 U/L    AST 14 (L) 15 - 37 U/L    ALT 19 12 - 65 U/L   Basic Metabolic Panel    Collection Time: 02/22/23  4:55 AM   Result Value Ref Range    Sodium 138 133 - 143 mmol/L    Potassium 3.5 3.5 - 5.1 mmol/L    Chloride 107 101 - 110 mmol/L    CO2 26 21 - 32 mmol/L    Anion Gap 5 2 - 11 mmol/L    Glucose 125 (H) 65 - 100 mg/dL    BUN 8 6 - 23 MG/DL    Creatinine 0.60 (L) 0.8 - 1.5 MG/DL    Est, Glom Filt Rate >60 >60 ml/min/1.73m2    Calcium 8.7 8.3 - 10.4 MG/DL   Magnesium    Collection Time: 02/22/23  4:55 AM   Result Value Ref Range    Magnesium 1.8 1.8 - 2.4 mg/dL   Phosphorus    Collection Time: 02/22/23  4:55 AM   Result Value Ref Range    Phosphorus 3.2 2.5 - 4.5 MG/DL   Triglyceride    Collection Time: 02/22/23  4:55 AM   Result Value Ref Range    Triglycerides 100 35 - 150 MG/DL       I have personally reviewed imaging studies:  Other Studies:  CT ABDOMEN PELVIS W IV CONTRAST Additional Contrast? None   Final Result   Addendum (preliminary) 2 of 2   ADDENDUM:       Case discussed with MD.       Axial image 29 appears to show fluid in the lumen of the gallbladder    communicating with fluid in the lumen of the colon. This is a subtle    finding;    artifact cannot be completely excluded. Coronal images 23 and 24 show extraluminal fluid density abutting the    superior    margin of the colon where it abuts the gallbladder. Amadou Mayberry M.D.    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 2. Biliary stent in good position.           Amadou Mayberry M.D.    2/18/2023 8:58:00 PM          Current Meds:  Current Facility-Administered Medications   Medication Dose Route Frequency    PN-Adult Premix 5/15 - Central   IntraVENous Continuous TPN    fat emulsion 20 % infusion 250 mL  250 mL IntraVENous Once per day on Mon Wed Fri    potassium chloride 20 mEq/50 mL IVPB (Central Line)  20 mEq IntraVENous PRN    Or    potassium chloride 10 mEq/100 mL IVPB (Peripheral Line)  10 mEq IntraVENous PRN    magnesium sulfate 2000 mg in 50 mL IVPB premix  2,000 mg IntraVENous PRN    sodium phosphate 10 mmol in sodium chloride 0.9 % 250 mL IVPB  10 mmol IntraVENous PRN    Or    sodium phosphate 15 mmol in sodium chloride 0.9 % 250 mL IVPB  15 mmol IntraVENous PRN    Or    sodium phosphate 20 mmol in sodium chloride 0.9 % 500 mL IVPB  20 mmol IntraVENous PRN    diphenhydrAMINE (BENADRYL) injection 25 mg  25 mg IntraVENous Nightly PRN    PN-Adult Premix 4.25/5 - Peripheral Line   IntraVENous Continuous TPN    sodium chloride flush 0.9 % injection 5-40 mL  5-40 mL IntraVENous 2 times per day    sodium chloride flush 0.9 % injection 5-40 mL  5-40 mL IntraVENous PRN    0.9 % sodium chloride infusion  25 mL IntraVENous PRN    HYDROmorphone (DILAUDID) injection 1 mg  1 mg IntraVENous Q4H PRN    HYDROmorphone (DILAUDID) injection 0.5 mg  0.5 mg IntraVENous Q4H PRN    sodium chloride flush 0.9 % injection 5-40 mL  5-40 mL IntraVENous PRN    0.9 % sodium chloride infusion   IntraVENous PRN    ondansetron (ZOFRAN-ODT) disintegrating tablet 4 mg  4 mg Oral Q8H PRN    Or    ondansetron (ZOFRAN) injection 4 mg  4 mg IntraVENous Q6H PRN    acetaminophen (TYLENOL) tablet 650 mg  650 mg Oral Q6H PRN    piperacillin-tazobactam (ZOSYN) 3,375 mg in sodium chloride 0.9 % 50 mL IVPB (mini-bag)  3,375 mg IntraVENous q8h    pantoprazole (PROTONIX) 40 mg in sodium chloride (PF) 0.9 % 10 mL injection  40 mg IntraVENous Daily       Signed:  ROLAND BIANCHI MD    Part of this note may have been written by using a voice dictation software.  The note has been proof read but may still contain some grammatical/other typographical errors.

## 2023-02-22 NOTE — CARE COORDINATION
LOS 4d    Chart reviewed by [facility] and discussed in IDR. Patient admitted with c/o abdominal pain. CT a/p indicating Choledochocolonic fistula. General surgery following. Patient NPO. TPN started 02/21 via Right DL PICC. CM following.

## 2023-02-22 NOTE — PROGRESS NOTES
Comprehensive Nutrition Assessment    Type and Reason for Visit: Reassess  Malnutrition Screening Tool: Malnutrition Screen  Have you recently lost weight without trying?: 2 to 13 pounds (1 point)  Have you been eating poorly because of a decreased appetite?: Yes (1 point)  Malnutrition Screening Tool Score: 2  TPN Management (Hospitalist)    Nutrition Recommendations/Plan:   Parenteral Nutrition:  Central parenteral nutrition  begins at 1800 today  Change: Dex 15%, 5% AA 2 L (85ml/hr)   Initiate 250 ml 20% lipids 3 x/week   To provide: 1634 kcal average/d (93% of needs), 100 grams of protein/d (100% of needs), 300 grams of CHO/d and 2147 ml average total volume/d. Lytes/L:   Sodium ( 60 meq NaCl), Potassium ( 35 meq KCl) Phosphorus ( 5 mmol KPO4), Calcium (4.5 meq), Magnesium 5 meq   Other additives:   MVI Monday Wednesday Friday due to [employer], MTE daily  Nutrition Related Medication Management:  Electrolyte Replacement Protocol PRN Potassium, Phosphorus, and Magnesium   Labs:   BMP daily  Mg daily x 3 days at initiation then MWF  Phos daily x 3 days at initiation then MWF    Triglyceride tomorrow  POC Glucoses/SSI Not indicated     Malnutrition Assessment:  Malnutrition Status: At risk for malnutrition (Comment) (weight loss, variable po over last 2 months)    No kimberly wasting  Nutrition Assessment:  Nutrition History: Patient reports that prior to Feb 1st he has been losing weight and not eating well. He reports usual weight of 210 lbs and got down to about 183#s before the procedure 2/1/23. He says following the procedure we started to eat again very well with no issues and gained weight back to at least 190 #s. He reports he than had procedure on 2/16 following that he has been getting severe abdominal pain that is worse when eating/drinking. He reports able to eat breakfast Friday morning with no N/V but significant pain.       Do You Have Any Cultural, Methodist, or Ethnic Food Preferences?: No Nutrition Background:   Wound Type: None   PMH only significant for GERD. Recent GI complaints followed by GI s/p ERCP with biliary stent on 2-1-23, EUS w/ FNA 2-16-23 who presented due to abdominal pain. Elevated lipase on admission. 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 Surgery consulted but no surgery for now awaiting resolution of pancreatitis. Nutrition Interval:  2/21: Double lumen PICC, PPN started without lipids. Pt sitting up in bed at RD visit. PN infusing at goal.  Will transition to TPN tonight given PICC access. Abdominal Status (last documented):   Last BM (including prior to admit): 02/21/23, GI Symptoms: Flatus   Pertinent Medications: Protonix, Zosyn Q3 hrs  Continuous: none  Electrolyte Replacement:  none - will implement  PRN: Zofran (no admin)  Pertinent Labs:   Lab Results   Component Value Date/Time     02/22/2023 04:55 AM    K 3.5 02/22/2023 04:55 AM     02/22/2023 04:55 AM    CO2 26 02/22/2023 04:55 AM    BUN 8 02/22/2023 04:55 AM    CREATININE 0.60 02/22/2023 04:55 AM    GLUCOSE 125 02/22/2023 04:55 AM    CALCIUM 8.7 02/22/2023 04:55 AM    PHOS 3.2 02/22/2023 04:55 AM    MG 1.8 02/22/2023 04:55 AM      Lab Results   Component Value Date/Time    TRIG 100 02/22/2023 04:55 AM   Bili 1.1. Remarkable for: relatively stable, Na w slight increase (will receive less volume w change to TPN today), no base Mg or Phos for comparison.       Current Nutrition Therapies:  Diet NPO Exceptions are: Ice Chips  PN-Adult Premix 4.25/5 - Peripheral Line  PN-Adult Premix 5/15 - Central  Current Parenteral Nutrition Orders:  Type and Formula: Premix Peripheral (Dex 5%, 4.25% AA)   Lipids: None  Duration: Continuous  Rate/Volume: 3 L (125ml/hr)  Current PN Order Provides: infusing per current order  Goal PN Orders Provides: 1020 kcal/d (58% of needs), 128 grams of protein/d (120% of needs), 150 grams of CHO/d and ~3000 ml of total volume/d    Current Intake:   Average Meal Intake: NPO Average Supplements Intake: NPO      Anthropometric Measures:  Height: 6' (182.9 cm)  Current Body Wt: 182 lb 15.7 oz (83 kg) (2/22), Weight source: Bed Scale  BMI: 24.8, Normal Weight (BMI 22.0 to 24.9) age over 72     Ideal Body Weight (Kg) (Calculated): 81 kg (178 lbs), 109.4 %  Usual Body Wt: 210 lb (95.3 kg), Percent weight change: -7.3       BMI Category Normal Weight (BMI 22.0 to 24.9) age over 72  Estimated Daily Nutrient Needs:  Energy (kcal/day): 4462-3233 (20-25 kcal/kg) (Kcal/kg Weight used: 88.3 kg Current (2/19)  Protein (g/day):  (1-1.2 g/kg) Weight Used: (Current) 88.3 kg  Fluid (ml/day):   (1 ml/kcal)    Nutrition Diagnosis:   Inadequate oral intake related to altered GI function as evidenced by NPO or clear liquid status due to medical condition, NPO status  Nutrition Interventions:   Food and/or Nutrient Delivery: Modify Parenteral Nutrition, Continue NPO     Coordination of Nutrition Care: Continue to monitor while inpatient       Goals:   Previous Goal Met: Progressing toward Goal(s)  Active Goal: Tolerate nutrition support at goal rate (within 3 days)       Nutrition Monitoring and Evaluation:      Food/Nutrient Intake Outcomes: Parenteral Nutrition Intake/Tolerance, Diet Advancement/Tolerance  Physical Signs/Symptoms Outcomes: Biochemical Data, GI Status, Fluid Status or Edema, Weight    Discharge Planning:     Too soon to determine    Ty Ronnie Omari 23, [address]

## 2023-02-23 LAB
ANION GAP SERPL CALC-SCNC: 4 MMOL/L (ref 2–11)
BACTERIA SPEC CULT: NORMAL
BUN SERPL-MCNC: 10 MG/DL (ref 6–23)
CALCIUM SERPL-MCNC: 8.8 MG/DL (ref 8.3–10.4)
CHLORIDE SERPL-SCNC: 110 MMOL/L (ref 101–110)
CO2 SERPL-SCNC: 25 MMOL/L (ref 21–32)
CREAT SERPL-MCNC: 0.6 MG/DL (ref 0.8–1.5)
GLUCOSE SERPL-MCNC: 114 MG/DL (ref 65–100)
MAGNESIUM SERPL-MCNC: 2.1 MG/DL (ref 1.8–2.4)
PHOSPHATE SERPL-MCNC: 3.3 MG/DL (ref 2.5–4.5)
POTASSIUM SERPL-SCNC: 4.1 MMOL/L (ref 3.5–5.1)
SERVICE CMNT-IMP: NORMAL
SODIUM SERPL-SCNC: 139 MMOL/L (ref 133–143)
TRIGL SERPL-MCNC: 184 MG/DL (ref 35–150)

## 2023-02-23 PROCEDURE — 6360000002 HC RX W HCPCS: Performed by: NURSE PRACTITIONER

## 2023-02-23 PROCEDURE — 99233 SBSQ HOSP IP/OBS HIGH 50: CPT | Performed by: SURGERY

## 2023-02-23 PROCEDURE — 2580000003 HC RX 258: Performed by: SURGERY

## 2023-02-23 PROCEDURE — 6360000002 HC RX W HCPCS: Performed by: SURGERY

## 2023-02-23 PROCEDURE — 84478 ASSAY OF TRIGLYCERIDES: CPT

## 2023-02-23 PROCEDURE — 84100 ASSAY OF PHOSPHORUS: CPT

## 2023-02-23 PROCEDURE — 6360000002 HC RX W HCPCS: Performed by: INTERNAL MEDICINE

## 2023-02-23 PROCEDURE — 83735 ASSAY OF MAGNESIUM: CPT

## 2023-02-23 PROCEDURE — 1100000000 HC RM PRIVATE

## 2023-02-23 PROCEDURE — 1100000003 HC PRIVATE W/ TELEMETRY

## 2023-02-23 PROCEDURE — 80048 BASIC METABOLIC PNL TOTAL CA: CPT

## 2023-02-23 PROCEDURE — 2500000003 HC RX 250 WO HCPCS: Performed by: SURGERY

## 2023-02-23 PROCEDURE — 2580000003 HC RX 258: Performed by: INTERNAL MEDICINE

## 2023-02-23 PROCEDURE — A4216 STERILE WATER/SALINE, 10 ML: HCPCS | Performed by: INTERNAL MEDICINE

## 2023-02-23 PROCEDURE — C9113 INJ PANTOPRAZOLE SODIUM, VIA: HCPCS | Performed by: INTERNAL MEDICINE

## 2023-02-23 RX ORDER — SODIUM CHLORIDE 9 MG/ML
INJECTION, SOLUTION INTRAVENOUS PRN
Status: DISCONTINUED | OUTPATIENT
Start: 2023-02-23 | End: 2023-02-23

## 2023-02-23 RX ORDER — LANOLIN ALCOHOL/MO/W.PET/CERES
6 CREAM (GRAM) TOPICAL NIGHTLY PRN
Status: DISCONTINUED | OUTPATIENT
Start: 2023-02-23 | End: 2023-02-28 | Stop reason: HOSPADM

## 2023-02-23 RX ADMIN — PANTOPRAZOLE SODIUM 40 MG: 40 INJECTION, POWDER, LYOPHILIZED, FOR SOLUTION INTRAVENOUS at 08:39

## 2023-02-23 RX ADMIN — HYDROMORPHONE HYDROCHLORIDE 1 MG: 1 INJECTION, SOLUTION INTRAMUSCULAR; INTRAVENOUS; SUBCUTANEOUS at 04:40

## 2023-02-23 RX ADMIN — PIPERACILLIN AND TAZOBACTAM 3375 MG: 3; .375 INJECTION, POWDER, LYOPHILIZED, FOR SOLUTION INTRAVENOUS at 21:11

## 2023-02-23 RX ADMIN — SODIUM CHLORIDE, PRESERVATIVE FREE 10 ML: 5 INJECTION INTRAVENOUS at 21:11

## 2023-02-23 RX ADMIN — PIPERACILLIN AND TAZOBACTAM 3375 MG: 3; .375 INJECTION, POWDER, LYOPHILIZED, FOR SOLUTION INTRAVENOUS at 12:21

## 2023-02-23 RX ADMIN — POTASSIUM CHLORIDE: 2 INJECTION, SOLUTION, CONCENTRATE INTRAVENOUS at 17:32

## 2023-02-23 RX ADMIN — SODIUM CHLORIDE, PRESERVATIVE FREE 10 ML: 5 INJECTION INTRAVENOUS at 08:39

## 2023-02-23 RX ADMIN — PIPERACILLIN AND TAZOBACTAM 3375 MG: 3; .375 INJECTION, POWDER, LYOPHILIZED, FOR SOLUTION INTRAVENOUS at 04:39

## 2023-02-23 RX ADMIN — DIPHENHYDRAMINE HYDROCHLORIDE 25 MG: 50 INJECTION, SOLUTION INTRAMUSCULAR; INTRAVENOUS at 21:11

## 2023-02-23 RX ADMIN — HYDROMORPHONE HYDROCHLORIDE 1 MG: 1 INJECTION, SOLUTION INTRAMUSCULAR; INTRAVENOUS; SUBCUTANEOUS at 23:38

## 2023-02-23 ASSESSMENT — PAIN SCALES - GENERAL
PAINLEVEL_OUTOF10: 8
PAINLEVEL_OUTOF10: 7

## 2023-02-23 ASSESSMENT — PAIN DESCRIPTION - DESCRIPTORS
DESCRIPTORS: ACHING;BURNING
DESCRIPTORS: PRESSURE;SHARP

## 2023-02-23 ASSESSMENT — PAIN - FUNCTIONAL ASSESSMENT
PAIN_FUNCTIONAL_ASSESSMENT: ACTIVITIES ARE NOT PREVENTED
PAIN_FUNCTIONAL_ASSESSMENT: ACTIVITIES ARE NOT PREVENTED

## 2023-02-23 ASSESSMENT — PAIN DESCRIPTION - ORIENTATION: ORIENTATION: MID

## 2023-02-23 ASSESSMENT — PAIN DESCRIPTION - LOCATION
LOCATION: BACK;ABDOMEN
LOCATION: BACK

## 2023-02-23 ASSESSMENT — PAIN DESCRIPTION - PAIN TYPE: TYPE: ACUTE PAIN

## 2023-02-23 NOTE — PROGRESS NOTES
END OF SHIFT NOTE:    INTAKE/OUTPUT  02/22 0701 - 02/23 0700  In: 3086.9 [I.V.:3086.9]  Out: 2511 [Urine:2525]  Voiding: Yes  Catheter: No  Drain:              Flatus: Patient does have flatus present. Stool: 0 occurrences. Characteristics:  Stool Appearance:  (No stool to assess this AM)  Stool Color: Unable to assess  Stool Amount: Unable to assess  Stool Assessment  Stool Appearance:  (No stool to assess this AM)  Stool Color: Unable to assess  Stool Amount: Unable to assess  Last BM (including prior to admit): 02/22/23 (per patient)    Emesis: 0 occurrences. Characteristics:        VITAL SIGNS  Patient Vitals for the past 12 hrs:   Temp Pulse Resp BP SpO2   02/23/23 1552 -- 56 18 123/88 96 %   02/23/23 1102 97.5 °F (36.4 °C) 69 18 100/67 96 %   02/23/23 0814 97.3 °F (36.3 °C) 55 18 105/72 --       Pain Assessment  Pain Level: 7 (02/23/23 0440)  Pain Location: Back  Patient's Stated Pain Goal: 0 - No pain    Ambulating  Yes    Shift report given to oncoming nurse at the bedside.     Rico Helton RN

## 2023-02-23 NOTE — PROGRESS NOTES
Comprehensive Nutrition Assessment    Type and Reason for Visit: Reassess  Malnutrition Screening Tool: Malnutrition Screen  Have you recently lost weight without trying?: 2 to 13 pounds (1 point)  Have you been eating poorly because of a decreased appetite?: Yes (1 point)  Malnutrition Screening Tool Score: 2  TPN Management (Hospitalist)    Nutrition Recommendations/Plan:   Parenteral Nutrition:  Central parenteral nutrition  new bag to begin at 1800 today  Continue: Dex 15%, 5% AA 2 L (85ml/hr)   Continue 250 ml 20% lipids 3 x/week (MWF)  Lytes/L:   Sodium ( 60 meq NaCl), Potassium ( 30 meq KCl) Phosphorus ( 5 mmol KPO4), Calcium (4.5 meq), Magnesium 5 meq   Other additives:   MVI Monday Wednesday Friday due to [employer], MTE daily  Nutrition Related Medication Management:  Electrolyte Replacement Protocol PRN Potassium, Phosphorus, and Magnesium   Labs:   BMP daily  Mg daily x 3 days at initiation then MWF  Phos daily x 3 days at initiation then MWF    Triglyceride weekly on Tuesdays  POC Glucoses/SSI Not indicated     Malnutrition Assessment:  Malnutrition Status: At risk for malnutrition (Comment) (weight loss, variable po over last 2 months)    No kimberly wasting  Nutrition Assessment:  Nutrition History: Patient reports that prior to Feb 1st he has been losing weight and not eating well. He reports usual weight of 210 lbs and got down to about 183#s before the procedure 2/1/23. He says following the procedure we started to eat again very well with no issues and gained weight back to at least 190 #s. He reports he than had procedure on 2/16 following that he has been getting severe abdominal pain that is worse when eating/drinking. He reports able to eat breakfast Friday morning with no N/V but significant pain. Do You Have Any Cultural, [place], or Ethnic Food Preferences?: No   Nutrition Background:   Wound Type: None   PMH only significant for GERD.  Recent GI complaints followed by GI s/p ERCP with biliary stent on 2-1-23, EUS w/ FNA 2-16-23 who presented due to abdominal pain. Elevated lipase on admission. 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 Surgery consulted but no surgery for now awaiting resolution of pancreatitis. Nutrition Interval:  2/21: Double lumen PICC, PPN started without lipids. 2/22: advanced to TPN with lipids x 3/week. Pt up to recliner at [address] visit. TPN on hold as pt just got out of the shower. Pt reports feeling the best he has today, awaiting repeat CT. Discussed with Maritza Porras RN. Abdominal Status (last documented):   Last BM (including prior to admit): 02/22/23 (per patient), GI Symptoms: Flatus   Pertinent Medications: Protonix, Zosyn Q3 hrs  Continuous: none  Electrolyte Replacement:  electrolyte protocol active  PRN: Zofran (no admin)  Pertinent Labs:   Lab Results   Component Value Date/Time     02/23/2023 04:50 AM    K 4.1 02/23/2023 04:50 AM     02/23/2023 04:50 AM    CO2 25 02/23/2023 04:50 AM    BUN 10 02/23/2023 04:50 AM    CREATININE 0.60 02/23/2023 04:50 AM    GLUCOSE 114 02/23/2023 04:50 AM    CALCIUM 8.8 02/23/2023 04:50 AM    PHOS 3.3 02/23/2023 04:50 AM    MG 2.1 02/23/2023 04:50 AM      Lab Results   Component Value Date/Time    TRIG 184 02/23/2023 04:50 AM    TRIG 100 02/22/2023 04:55 AM   Bili 1.1.  (2/21)  Remarkable for: relatively stable, Na w slight increase (volume and total Na content decreased 2/22), K increasing, phos and Mg stable. TG appropriate for proceeding w lipids 3 x weekly.        Current Nutrition Therapies:  Diet NPO Exceptions are: Ice Chips  PN-Adult Premix 5/15 - Central  Current Parenteral Nutrition Orders:  Type and Formula: Premix Central (Dex 15%, 5% AA)   Lipids: 250ml, Three times weekly  Duration: Continuous  Rate/Volume: 2 L (85ml/hr)  Current PN Order Provides: infusing per current order  Goal PN Orders Provides: 1634 kcal average/d (93% of needs), 100 grams of protein/d (100% of needs), 300 grams of CHO/d and 2147 ml average total volume/d    Current Intake:   Average Meal Intake: NPO Average Supplements Intake: NPO      Anthropometric Measures:  Height: 6' (182.9 cm)  Current Body Wt: 182 lb 1.6 oz (82.6 kg) (2/23), Weight source: Bed Scale  BMI: 24.7, Normal Weight (BMI 22.0 to 24.9) age over 72     Ideal Body Weight (Kg) (Calculated): 81 kg (178 lbs), 109.4 %  Usual Body Wt: 210 lb (95.3 kg), Percent weight change: -7.3       BMI Category Normal Weight (BMI 22.0 to 24.9) age over 72  Estimated Daily Nutrient Needs:  Energy (kcal/day): 0787-6657 (20-25 kcal/kg) (Kcal/kg Weight used: 88.3 kg Current (2/19)  Protein (g/day):  (1-1.2 g/kg) Weight Used: (Current) 88.3 kg  Fluid (ml/day):   (1 ml/kcal)    Nutrition Diagnosis:   Inadequate oral intake related to altered GI function as evidenced by NPO or clear liquid status due to medical condition, NPO status  Nutrition Interventions:   Food and/or Nutrient Delivery: Modify Parenteral Nutrition, Continue NPO     Coordination of Nutrition Care: Continue to monitor while inpatient       Goals:   Previous Goal Met: Goal(s) Achieved  Active Goal:  (Maintain PN for primary needs)       Nutrition Monitoring and Evaluation:      Food/Nutrient Intake Outcomes: Parenteral Nutrition Intake/Tolerance, Diet Advancement/Tolerance  Physical Signs/Symptoms Outcomes: Biochemical Data, GI Status, Fluid Status or Edema, Weight    Discharge Planning:     Too soon to determine    Ty Ronnie Omari 23, [address]

## 2023-02-23 NOTE — PROGRESS NOTES
Hospitalist Progress Note   Admit Date:  2023  5:20 PM   Name:  Garo Clark. Age:  61 y.o. Sex:  male  :  1965   MRN:  [id number]   Room:      Presenting Complaint: Abdominal Pain     Reason(s) for Admission: Abdominal pain, epigastric [R10.13]  Cholecystitis [K81.9]  Sepsis ([facility] [address] [A41.9]  Acute biliary pancreatitis, unspecified complication status [U26.85]     Hospital Course:   Mr. Jolly Adams. is a 61 y.o. CM with a PMH of tobacco use who underwent ERCP with biliary stent on 23, EUS w/ FNA 23 who admitted with post ERCP pancreatitis. FNA biopsy negative for malignancy. CT a/p indicated a possible choledochocolonic fistula. Started on supportive care and empiric antibiotics. GI and surgery consulted. Patient is strict n.p.o.  Nutrition consulted and started on TPN . Subjective & 24hr Events (23):   Up in the room, ambulating, no pain, feels well today. Family at bedside. Assessment & Plan:   # Post-ERCP pancreatitis   - Improved, pain nearly resolved. - Con't symptomatic mgmt. # Choledochocolonic fistula   - Per Surgery. Repeat CT?   - Con't TPN    # HypoNa   - Resolved. Anticipate transfer to Surgical service based on  note, will verify.     Diet:  Diet NPO Exceptions are: Ice Chips  PN-Adult Premix 5/15 - Central  PN-Adult Premix 5/15 - Central  VTE prophylaxis: Ambulation  Code status: Full Code    Hospital Problems:  Principal Problem:    Post-ERCP acute pancreatitis  Active Problems:    Biliary stricture    Abdominal pain    Hyponatremia    Tobacco use    Pancreatitis  Resolved Problems:    Sepsis ([facility] [address]      Objective:   Patient Vitals for the past 24 hrs:   Temp Pulse Resp BP SpO2   23 1102 97.5 °F (36.4 °C) 69 18 100/67 96 %   23 0814 97.3 °F (36.3 °C) 55 18 105/72 --   23 0417 97.5 °F (36.4 °C) 61 18 114/82 93 %   23 2350 97.7 °F (36.5 °C) 56 18 114/71 95 %   23 97.3 °F (36.3 °C) 59 18 124/85 93 %   02/22/23 1905 -- 53 -- -- --   02/22/23 1557 97.7 °F (36.5 °C) 67 18 (!) 117/92 95 %       Oxygen Therapy  SpO2: 96 %  Pulse Oximetry Type: Intermittent  O2 Device: None (Room air)    Estimated body mass index is 24.68 kg/m² as calculated from the following:    Height as of this encounter: 6' (1.829 m). Weight as of this encounter: 182 lb (82.6 kg). Intake/Output Summary (Last 24 hours) at 2/23/2023 1331  Last data filed at 2/23/2023 2615  Gross per 24 hour   Intake 3086.94 ml   Output 2275 ml   Net 811.94 ml         Physical Exam:   Blood pressure 100/67, pulse 69, temperature 97.5 °F (36.4 °C), temperature source Oral, resp. rate 18, height 6' (1.829 m), weight 182 lb (82.6 kg), SpO2 96 %. General:    Well nourished. Head:  Normocephalic, atraumatic  Eyes:  Sclerae appear normal.  Pupils equally round. ENT:  Nares appear normal.  Moist oral mucosa  Neck:  No restricted ROM. Trachea midline   CV:   RRR. No m/r/g. No jugular venous distension. Lungs:   CTAB. No wheezing, rhonchi, or rales. Symmetric expansion. Abdomen:   Soft, nontender, nondistended. Extremities: No cyanosis or clubbing. No edema  Skin:     No rashes and normal coloration. Warm and dry. Neuro:  CN II-XII grossly intact. Psych:  Normal mood and affect.       I have personally reviewed labs and tests:  Recent Labs:  Recent Results (from the past 48 hour(s))   Hepatic function panel    Collection Time: 02/21/23  2:56 PM   Result Value Ref Range    Total Protein 6.6 6.3 - 8.2 g/dL    Albumin 2.6 (L) 3.5 - 5.0 g/dL    Globulin 4.0 2.8 - 4.5 g/dL    Albumin/Globulin Ratio 0.7 0.4 - 1.6      Total Bilirubin 1.1 0.2 - 1.1 MG/DL    Bilirubin, Direct 0.4 (H) <0.4 MG/DL    Alk Phosphatase 80 50 - 136 U/L    AST 14 (L) 15 - 37 U/L    ALT 19 12 - 65 U/L   Basic Metabolic Panel    Collection Time: 02/22/23  4:55 AM   Result Value Ref Range    Sodium 138 133 - 143 mmol/L    Potassium 3.5 3.5 - 5.1 mmol/L    Chloride 107 101 - 110 mmol/L    CO2 26 21 - 32 mmol/L    Anion Gap 5 2 - 11 mmol/L    Glucose 125 (H) 65 - 100 mg/dL    BUN 8 6 - 23 MG/DL    Creatinine 0.60 (L) 0.8 - 1.5 MG/DL    Est, Glom Filt Rate >60 >60 ml/min/1.73m2    Calcium 8.7 8.3 - 10.4 MG/DL   Magnesium    Collection Time: 02/22/23  4:55 AM   Result Value Ref Range    Magnesium 1.8 1.8 - 2.4 mg/dL   Phosphorus    Collection Time: 02/22/23  4:55 AM   Result Value Ref Range    Phosphorus 3.2 2.5 - 4.5 MG/DL   Triglyceride    Collection Time: 02/22/23  4:55 AM   Result Value Ref Range    Triglycerides 100 35 - 150 MG/DL   Basic Metabolic Panel    Collection Time: 02/23/23  4:50 AM   Result Value Ref Range    Sodium 139 133 - 143 mmol/L    Potassium 4.1 3.5 - 5.1 mmol/L    Chloride 110 101 - 110 mmol/L    CO2 25 21 - 32 mmol/L    Anion Gap 4 2 - 11 mmol/L    Glucose 114 (H) 65 - 100 mg/dL    BUN 10 6 - 23 MG/DL    Creatinine 0.60 (L) 0.8 - 1.5 MG/DL    Est, Glom Filt Rate >60 >60 ml/min/1.73m2    Calcium 8.8 8.3 - 10.4 MG/DL   Magnesium    Collection Time: 02/23/23  4:50 AM   Result Value Ref Range    Magnesium 2.1 1.8 - 2.4 mg/dL   Phosphorus    Collection Time: 02/23/23  4:50 AM   Result Value Ref Range    Phosphorus 3.3 2.5 - 4.5 MG/DL   Triglyceride    Collection Time: 02/23/23  4:50 AM   Result Value Ref Range    Triglycerides 184 (H) 35 - 150 MG/DL       I have personally reviewed imaging studies:  Other Studies:  CT ABDOMEN PELVIS W IV CONTRAST Additional Contrast? None   Final Result   Addendum (preliminary) 2 of 2   ADDENDUM:       Case discussed with MD.       Axial image 29 appears to show fluid in the lumen of the gallbladder    communicating with fluid in the lumen of the colon. This is a subtle    finding;    artifact cannot be completely excluded. Coronal images 23 and 24 show extraluminal fluid density abutting the    superior    margin of the colon where it abuts the gallbladder. Karena Benitez M.D.    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 2. Biliary stent in good position.           Mechele Schwab, M.D.    2/18/2023 8:58:00 PM          Current Meds:  Current Facility-Administered Medications   Medication Dose Route Frequency    PN-Adult Premix 5/15 - Central   IntraVENous Continuous TPN    PN-Adult Premix 5/15 - Central   IntraVENous Continuous TPN    fat emulsion 20 % infusion 250 mL  250 mL IntraVENous Once per day on Mon Wed Fri    potassium chloride 20 mEq/50 mL IVPB (Central Line)  20 mEq IntraVENous PRN    Or    potassium chloride 10 mEq/100 mL IVPB (Peripheral Line)  10 mEq IntraVENous PRN    magnesium sulfate 2000 mg in 50 mL IVPB premix  2,000 mg IntraVENous PRN    sodium phosphate 10 mmol in sodium chloride 0.9 % 250 mL IVPB  10 mmol IntraVENous PRN    Or    sodium phosphate 15 mmol in sodium chloride 0.9 % 250 mL IVPB  15 mmol IntraVENous PRN    Or    sodium phosphate 20 mmol in sodium chloride 0.9 % 500 mL IVPB  20 mmol IntraVENous PRN    diphenhydrAMINE (BENADRYL) injection 25 mg  25 mg IntraVENous Nightly PRN    sodium chloride flush 0.9 % injection 5-40 mL  5-40 mL IntraVENous 2 times per day    sodium chloride flush 0.9 % injection 5-40 mL  5-40 mL IntraVENous PRN    0.9 % sodium chloride infusion  25 mL IntraVENous PRN    HYDROmorphone (DILAUDID) injection 1 mg  1 mg IntraVENous Q4H PRN    HYDROmorphone (DILAUDID) injection 0.5 mg  0.5 mg IntraVENous Q4H PRN    sodium chloride flush 0.9 % injection 5-40 mL  5-40 mL IntraVENous PRN    0.9 % sodium chloride infusion   IntraVENous PRN    ondansetron (ZOFRAN-ODT) disintegrating tablet 4 mg  4 mg Oral Q8H PRN    Or    ondansetron (ZOFRAN) injection 4 mg  4 mg IntraVENous Q6H PRN    acetaminophen (TYLENOL) tablet 650 mg  650 mg Oral Q6H PRN    piperacillin-tazobactam (ZOSYN) 3,375 mg in sodium chloride 0.9 % 50 mL IVPB (mini-bag)  3,375 mg IntraVENous q8h    pantoprazole (PROTONIX) 40 mg in sodium chloride (PF) 0.9 % 10 mL injection 40 mg IntraVENous Daily       Signed:  Lisa Booker MD    Part of this note may have been written by using a voice dictation software. The note has been proof read but may still contain some grammatical/other typographical errors.

## 2023-02-23 NOTE — PROGRESS NOTES
END OF SHIFT NOTE:    INTAKE/OUTPUT  02/22 0701 - 02/23 0700  In: 3086.9 [I.V.:3086.9]  Out: 6834 [Urine:2525]  Voiding: Yes  Catheter: No  Drain:              Flatus: Patient does have flatus present. Stool:  occurrences. Characteristics:  Stool Appearance:  (No stool to assess this AM)  Stool Color: Unable to assess  Stool Amount: Unable to assess  Stool Assessment  Stool Appearance:  (No stool to assess this AM)  Stool Color: Unable to assess  Stool Amount: Unable to assess  Last BM (including prior to admit): 02/22/23 (per patient)    Emesis:  occurrences. Characteristics:        VITAL SIGNS  Patient Vitals for the past 12 hrs:   Temp Pulse Resp BP SpO2   02/23/23 0417 97.5 °F (36.4 °C) 61 18 114/82 93 %   02/22/23 2350 97.7 °F (36.5 °C) 56 18 114/71 95 %   02/22/23 2015 97.3 °F (36.3 °C) 59 18 124/85 93 %   02/22/23 1905 -- 53 -- -- --       Pain Assessment  Pain Level: 7 (02/23/23 0440)  Pain Location: Back  Patient's Stated Pain Goal: 0 - No pain    Ambulating  Yes    Shift report to be given to oncoming nurse at the bedside.     Magdy Meier RN

## 2023-02-23 NOTE — PLAN OF CARE
Problem: Discharge Planning  Goal: Discharge to home or other facility with appropriate resources  Outcome: Progressing     Problem: Pain  Goal: Verbalizes/displays adequate comfort level or baseline comfort level  Outcome: Progressing     Problem: Safety - Adult  Goal: Free from fall injury  Outcome: Progressing  Flowsheets (Taken 2/23/2023 5803)  Free From Fall Injury: Instruct family/caregiver on patient safety     Problem: ABCDS Injury Assessment  Goal: Absence of physical injury  Outcome: Progressing

## 2023-02-24 PROBLEM — K81.0 ACUTE CHOLECYSTITIS: Status: ACTIVE | Noted: 2023-02-18

## 2023-02-24 LAB
ANION GAP SERPL CALC-SCNC: 4 MMOL/L (ref 2–11)
BACTERIA SPEC CULT: NORMAL
BASOPHILS # BLD: 0.1 K/UL (ref 0–0.2)
BASOPHILS NFR BLD: 1 % (ref 0–2)
BUN SERPL-MCNC: 11 MG/DL (ref 6–23)
CALCIUM SERPL-MCNC: 9.5 MG/DL (ref 8.3–10.4)
CHLORIDE SERPL-SCNC: 110 MMOL/L (ref 101–110)
CO2 SERPL-SCNC: 25 MMOL/L (ref 21–32)
CREAT SERPL-MCNC: 0.7 MG/DL (ref 0.8–1.5)
DIFFERENTIAL METHOD BLD: ABNORMAL
EOSINOPHIL # BLD: 0.3 K/UL (ref 0–0.8)
EOSINOPHIL NFR BLD: 5 % (ref 0.5–7.8)
ERYTHROCYTE [DISTWIDTH] IN BLOOD BY AUTOMATED COUNT: 12.2 % (ref 11.9–14.6)
GLUCOSE SERPL-MCNC: 87 MG/DL (ref 65–100)
HCT VFR BLD AUTO: 39.3 % (ref 41.1–50.3)
HGB BLD-MCNC: 13.5 G/DL (ref 13.6–17.2)
IMM GRANULOCYTES # BLD AUTO: 0 K/UL (ref 0–0.5)
IMM GRANULOCYTES NFR BLD AUTO: 0 % (ref 0–5)
LYMPHOCYTES # BLD: 2.6 K/UL (ref 0.5–4.6)
LYMPHOCYTES NFR BLD: 41 % (ref 13–44)
MAGNESIUM SERPL-MCNC: 2.2 MG/DL (ref 1.8–2.4)
MCH RBC QN AUTO: 31.2 PG (ref 26.1–32.9)
MCHC RBC AUTO-ENTMCNC: 34.4 G/DL (ref 31.4–35)
MCV RBC AUTO: 90.8 FL (ref 82–102)
MONOCYTES # BLD: 0.7 K/UL (ref 0.1–1.3)
MONOCYTES NFR BLD: 10 % (ref 4–12)
NEUTS SEG # BLD: 2.7 K/UL (ref 1.7–8.2)
NEUTS SEG NFR BLD: 43 % (ref 43–78)
NRBC # BLD: 0 K/UL (ref 0–0.2)
PHOSPHATE SERPL-MCNC: 3.9 MG/DL (ref 2.5–4.5)
PLATELET # BLD AUTO: 293 K/UL (ref 150–450)
PMV BLD AUTO: 10.1 FL (ref 9.4–12.3)
POTASSIUM SERPL-SCNC: 4.1 MMOL/L (ref 3.5–5.1)
RBC # BLD AUTO: 4.33 M/UL (ref 4.23–5.6)
SERVICE CMNT-IMP: NORMAL
SODIUM SERPL-SCNC: 139 MMOL/L (ref 133–143)
WBC # BLD AUTO: 6.3 K/UL (ref 4.3–11.1)

## 2023-02-24 PROCEDURE — 2580000003 HC RX 258: Performed by: INTERNAL MEDICINE

## 2023-02-24 PROCEDURE — 2500000003 HC RX 250 WO HCPCS: Performed by: INTERNAL MEDICINE

## 2023-02-24 PROCEDURE — 80048 BASIC METABOLIC PNL TOTAL CA: CPT

## 2023-02-24 PROCEDURE — 6360000002 HC RX W HCPCS: Performed by: INTERNAL MEDICINE

## 2023-02-24 PROCEDURE — A4216 STERILE WATER/SALINE, 10 ML: HCPCS | Performed by: INTERNAL MEDICINE

## 2023-02-24 PROCEDURE — 99232 SBSQ HOSP IP/OBS MODERATE 35: CPT | Performed by: SURGERY

## 2023-02-24 PROCEDURE — 84100 ASSAY OF PHOSPHORUS: CPT

## 2023-02-24 PROCEDURE — C9113 INJ PANTOPRAZOLE SODIUM, VIA: HCPCS | Performed by: INTERNAL MEDICINE

## 2023-02-24 PROCEDURE — 85025 COMPLETE CBC W/AUTO DIFF WBC: CPT

## 2023-02-24 PROCEDURE — 6360000002 HC RX W HCPCS: Performed by: NURSE PRACTITIONER

## 2023-02-24 PROCEDURE — 2580000003 HC RX 258: Performed by: SURGERY

## 2023-02-24 PROCEDURE — 83735 ASSAY OF MAGNESIUM: CPT

## 2023-02-24 PROCEDURE — 6360000002 HC RX W HCPCS: Performed by: SURGERY

## 2023-02-24 PROCEDURE — 1100000003 HC PRIVATE W/ TELEMETRY

## 2023-02-24 PROCEDURE — 1100000000 HC RM PRIVATE

## 2023-02-24 PROCEDURE — 2500000003 HC RX 250 WO HCPCS: Performed by: FAMILY MEDICINE

## 2023-02-24 RX ADMIN — DIPHENHYDRAMINE HYDROCHLORIDE 25 MG: 50 INJECTION, SOLUTION INTRAMUSCULAR; INTRAVENOUS at 21:21

## 2023-02-24 RX ADMIN — SODIUM CHLORIDE, PRESERVATIVE FREE 10 ML: 5 INJECTION INTRAVENOUS at 08:20

## 2023-02-24 RX ADMIN — POTASSIUM PHOSPHATE, MONOBASIC POTASSIUM PHOSPHATE, DIBASIC: 224; 236 INJECTION, SOLUTION, CONCENTRATE INTRAVENOUS at 17:58

## 2023-02-24 RX ADMIN — PANTOPRAZOLE SODIUM 40 MG: 40 INJECTION, POWDER, LYOPHILIZED, FOR SOLUTION INTRAVENOUS at 08:20

## 2023-02-24 RX ADMIN — HYDROMORPHONE HYDROCHLORIDE 1 MG: 1 INJECTION, SOLUTION INTRAMUSCULAR; INTRAVENOUS; SUBCUTANEOUS at 04:49

## 2023-02-24 RX ADMIN — SODIUM CHLORIDE, PRESERVATIVE FREE 10 ML: 5 INJECTION INTRAVENOUS at 21:20

## 2023-02-24 RX ADMIN — PIPERACILLIN AND TAZOBACTAM 3375 MG: 3; .375 INJECTION, POWDER, LYOPHILIZED, FOR SOLUTION INTRAVENOUS at 21:19

## 2023-02-24 RX ADMIN — PIPERACILLIN AND TAZOBACTAM 3375 MG: 3; .375 INJECTION, POWDER, LYOPHILIZED, FOR SOLUTION INTRAVENOUS at 12:45

## 2023-02-24 RX ADMIN — PIPERACILLIN AND TAZOBACTAM 3375 MG: 3; .375 INJECTION, POWDER, LYOPHILIZED, FOR SOLUTION INTRAVENOUS at 04:21

## 2023-02-24 RX ADMIN — SOYBEAN OIL 250 ML: 20 INJECTION, SOLUTION INTRAVENOUS at 17:58

## 2023-02-24 RX ADMIN — HYDROMORPHONE HYDROCHLORIDE 1 MG: 1 INJECTION, SOLUTION INTRAMUSCULAR; INTRAVENOUS; SUBCUTANEOUS at 23:24

## 2023-02-24 ASSESSMENT — PAIN DESCRIPTION - LOCATION
LOCATION: ABDOMEN
LOCATION: ABDOMEN
LOCATION: BACK;ABDOMEN

## 2023-02-24 ASSESSMENT — PAIN DESCRIPTION - ORIENTATION: ORIENTATION: ANTERIOR

## 2023-02-24 ASSESSMENT — PAIN SCALES - GENERAL
PAINLEVEL_OUTOF10: 0
PAINLEVEL_OUTOF10: 4
PAINLEVEL_OUTOF10: 8
PAINLEVEL_OUTOF10: 0
PAINLEVEL_OUTOF10: 8

## 2023-02-24 ASSESSMENT — PAIN DESCRIPTION - DESCRIPTORS
DESCRIPTORS: PRESSURE;SHARP
DESCRIPTORS: ACHING

## 2023-02-24 NOTE — PROGRESS NOTES
Comprehensive Nutrition Assessment    Type and Reason for Visit: Reassess  Malnutrition Screening Tool: Malnutrition Screen  Have you recently lost weight without trying?: 2 to 13 pounds (1 point)  Have you been eating poorly because of a decreased appetite?: Yes (1 point)  Malnutrition Screening Tool Score: 2  TPN Management (Hospitalist)    Nutrition Recommendations/Plan:   Parenteral Nutrition:  Central parenteral nutrition  new bag to begin at 1800 today  Continue: Dex 15%, 5% AA 2 L (85ml/hr)   Continue 250 ml 20% lipids 3 x/week (MWF)  Lytes/L:   Sodium ( 60 meq NaCl), Potassium ( 35 meq KCl) Phosphorus ( 2.5 mmol KPO4), Calcium (4.5 meq), Magnesium 5 meq   Other additives:   MVI Monday Wednesday Friday due to [employer], MTE daily  Nutrition Related Medication Management:  Electrolyte Replacement Protocol PRN Potassium, Phosphorus, and Magnesium   Labs:   BMP daily  Mg MWF  Phos MWF    Triglyceride weekly on Tuesdays  POC Glucoses/SSI Not indicated     Malnutrition Assessment:  Malnutrition Status: At risk for malnutrition (Comment) (weight loss, variable po over last 2 months)    No kimberly wasting  Nutrition Assessment:  Nutrition History: Patient reports that prior to Feb 1st he has been losing weight and not eating well. He reports usual weight of 210 lbs and got down to about 183#s before the procedure 2/1/23. He says following the procedure we started to eat again very well with no issues and gained weight back to at least 190 #s. He reports he than had procedure on 2/16 following that he has been getting severe abdominal pain that is worse when eating/drinking. He reports able to eat breakfast Friday morning with no N/V but significant pain. Do You Have Any Cultural, Mormon, or Ethnic Food Preferences?: No   Nutrition Background:   Wound Type: None   PMH only significant for GERD.  Recent GI complaints followed by GI s/p ERCP with biliary stent on 2-1-23, EUS w/ FNA 2-16-23 who presented due to abdominal pain. Elevated lipase on admission. 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 Surgery consulted but no surgery for now awaiting resolution of pancreatitis. Nutrition Interval:  2/21: Double lumen PICC, PPN started without lipids. 2/22: advanced to TPN with lipids x 3/week. Pt sitting up in bed, reports he is feeling much better. CT now planned for possible Monday per pt. TPN infusing at goal.  Discussed with Redd Valladares RN. Abdominal Status (last documented):   Last BM (including prior to admit): 02/23/23, GI Symptoms: Flatus, Cramping   Pertinent Medications: Protonix, Zosyn  Continuous: none  Electrolyte Replacement:  electrolyte protocol active  PRN: Zofran (no admin)  Pertinent Labs:   Lab Results   Component Value Date/Time     02/24/2023 05:13 AM    K 4.1 02/24/2023 05:13 AM     02/24/2023 05:13 AM    CO2 25 02/24/2023 05:13 AM    BUN 11 02/24/2023 05:13 AM    CREATININE 0.70 02/24/2023 05:13 AM    GLUCOSE 87 02/24/2023 05:13 AM    CALCIUM 9.5 02/24/2023 05:13 AM    PHOS 3.9 02/24/2023 05:13 AM    MG 2.2 02/24/2023 05:13 AM      Lab Results   Component Value Date/Time    TRIG 184 02/23/2023 04:50 AM    TRIG 100 02/22/2023 04:55 AM   Bili 1.1.  (2/21)  Remarkable for: relatively stable, Na w slight increase (volume and total Na content decreased 2/22), K stable, phos increasing and Mg stable. Glucose now wnl, previously w mild elevation. TG appropriate for proceeding w lipids 3 x weekly. Will decrease phos in PN tonight while maintaining similar K content.       Current Nutrition Therapies:  Diet NPO Exceptions are: Ice Chips  PN-Adult Premix 5/15 - Central  Current Parenteral Nutrition Orders:  Type and Formula: Premix Central (Dex 15%, 5% AA)   Lipids: 250ml, Three times weekly  Duration: Continuous  Rate/Volume: 2 L (85ml/hr)  Current PN Order Provides: infusing at goal  Goal PN Orders Provides: 1634 kcal average/d (93% of needs), 100 grams of protein/d (100% of needs), 300 grams of CHO/d and 2147 ml average total volume/d    Current Intake:   Average Meal Intake: NPO Average Supplements Intake: NPO      Anthropometric Measures:  Height: 6' (182.9 cm)  Current Body Wt: 180 lb 5.4 oz (81.8 kg) (2/24), Weight source: Not Specified  BMI: 24.5, Normal Weight (BMI 22.0 to 24.9) age over 72     Ideal Body Weight (Kg) (Calculated): 81 kg (178 lbs), 109.4 %  Usual Body Wt: 210 lb (95.3 kg), Percent weight change: -7.3       BMI Category Normal Weight (BMI 22.0 to 24.9) age over 72  Estimated Daily Nutrient Needs:  Energy (kcal/day): 0232-7093 (20-25 kcal/kg) (Kcal/kg Weight used: 88.3 kg Current (2/19)  Protein (g/day):  (1-1.2 g/kg) Weight Used: (Current) 88.3 kg  Fluid (ml/day):   (1 ml/kcal)    Nutrition Diagnosis:   Inadequate oral intake related to altered GI function as evidenced by NPO or clear liquid status due to medical condition, NPO status  Nutrition Interventions:   Food and/or Nutrient Delivery: Modify Parenteral Nutrition     Coordination of Nutrition Care: Continue to monitor while inpatient       Goals:   Previous Goal Met: Goal(s) Achieved  Active Goal:  (Maintain PN for primary needs)       Nutrition Monitoring and Evaluation:      Food/Nutrient Intake Outcomes: Parenteral Nutrition Intake/Tolerance, Diet Advancement/Tolerance  Physical Signs/Symptoms Outcomes: Biochemical Data, GI Status, Fluid Status or Edema, Weight    Discharge Planning:     Too soon to determine    Ty Ronnie Omari 23, [address]

## 2023-02-24 NOTE — PROGRESS NOTES
END OF SHIFT NOTE:    INTAKE/OUTPUT  02/23 0701 - 02/24 0700  In: 2017.4 [I.V.:2017.4]  Out: 2000 [Urine:2000]  Voiding: Yes  Catheter: No  Drain:              Flatus: Patient does have flatus present. Stool: 2 occurrences. Characteristics:  Stool Appearance: Watery  Stool Color: Brown  Stool Amount: Small  Stool Assessment  Incontinence: No  Stool Appearance: Watery  Stool Color: Brown  Stool Amount: Small  Stool Source: Rectum  Last BM (including prior to admit): 02/23/23    Emesis: 0 occurrences. Characteristics:        VITAL SIGNS  Patient Vitals for the past 12 hrs:   Temp Pulse Resp BP SpO2   02/24/23 1623 97.7 °F (36.5 °C) 53 16 106/77 97 %   02/24/23 1114 97.5 °F (36.4 °C) 59 18 104/83 96 %   02/24/23 0810 97.5 °F (36.4 °C) 60 18 107/76 95 %       Pain Assessment  Pain Level: 0 (02/24/23 0519)  Pain Location: Back, Abdomen  Patient's Stated Pain Goal: 0 - No pain    Ambulating  Yes    Shift report given to oncoming nurse at the bedside.     Mariano Sylvester RN

## 2023-02-24 NOTE — PLAN OF CARE
Problem: Discharge Planning  Goal: Discharge to home or other facility with appropriate resources  Outcome: Progressing     Problem: Pain  Goal: Verbalizes/displays adequate comfort level or baseline comfort level  Outcome: Progressing  Flowsheets (Taken 2/23/2023 6132 by Ángel Merino RN)  Verbalizes/displays adequate comfort level or baseline comfort level:   Encourage patient to monitor pain and request assistance   Assess pain using appropriate pain scale   Administer analgesics based on type and severity of pain and evaluate response   Implement non-pharmacological measures as appropriate and evaluate response     Problem: Safety - Adult  Goal: Free from fall injury  Outcome: Progressing  Flowsheets (Taken 2/24/2023 4032)  Free From Fall Injury: Instruct family/caregiver on patient safety     Problem: ABCDS Injury Assessment  Goal: Absence of physical injury  Outcome: Progressing

## 2023-02-24 NOTE — PROGRESS NOTES
Hospitalist Progress Note   Admit Date:  2023  5:20 PM   Name:  Theresa Yarbrough. Age:  61 y.o. Sex:  male  :  1965   MRN:  [id number]   Room:      Presenting Complaint: Abdominal Pain     Reason(s) for Admission: Abdominal pain, epigastric [R10.13]  Cholecystitis [K81.9]  Sepsis ([address] [A41.9]  Acute biliary pancreatitis, unspecified complication status [I62.65]     Hospital Course:   Mr. Scar Smith is a 61 y.o. CM with a PMH of tobacco use who underwent ERCP with biliary stent on 23, EUS w/ FNA 23 who admitted with post ERCP pancreatitis. FNA biopsy negative for malignancy. CT a/p indicated a possible choledochocolonic fistula. Started on supportive care and empiric antibiotics. GI and surgery consulted. Strict NPO, started on TPN . Subjective & 24hr Events (23): Continues to feel better, pain improved, no N/V. Assessment & Plan:   # Choledochocolonic fistula              - Repeat imaging and intervention per Surgical team.              - Con't TPN    # Post-ERCP pancreatitis              - Appears resolved. Con't symptomatic mgmt. # HypoNa              - Resolved. No acute medical issues, Surgery to assume care. I will sign off. Re-consult as needed/call with questions. Thanks.     Diet:  Diet NPO Exceptions are: Ice Chips  PN-Adult Premix 5/15 - Central  PN-Adult Premix 5/15 - Central  VTE prophylaxis: Ambulation   Code status: Full Code    Hospital Problems:  Principal Problem:    Post-ERCP acute pancreatitis  Active Problems:    Biliary stricture    Abdominal pain    Hyponatremia    Tobacco use    Pancreatitis  Resolved Problems:    Sepsis ([facility] [address]      Objective:   Patient Vitals for the past 24 hrs:   Temp Pulse Resp BP SpO2   23 1114 97.5 °F (36.4 °C) 59 18 104/83 96 %   23 0810 97.5 °F (36.4 °C) 60 18 107/76 95 %   23 0519 -- -- 16 -- --   23 0449 -- -- 18 -- --   23 0413 97.7 °F (36.5 °C) 59 18 103/71 96 % 02/24/23 0008 -- -- 16 -- --   02/23/23 2338 -- -- 18 -- --   02/23/23 2331 97.5 °F (36.4 °C) 61 18 119/84 97 %   02/23/23 2116 98 °F (36.7 °C) 57 18 127/77 97 %   02/23/23 1925 -- 60 18 -- --   02/23/23 1552 -- 56 18 123/88 96 %       Oxygen Therapy  SpO2: 96 %  Pulse Oximetry Type: Intermittent  O2 Device: None (Room air)    Estimated body mass index is 24.46 kg/m² as calculated from the following:    Height as of this encounter: 6' (1.829 m). Weight as of this encounter: 180 lb 5.4 oz (81.8 kg). Intake/Output Summary (Last 24 hours) at 2/24/2023 1215  Last data filed at 2/24/2023 0915  Gross per 24 hour   Intake 2257.42 ml   Output 2225 ml   Net 32.42 ml         Physical Exam:   Blood pressure 104/83, pulse 59, temperature 97.5 °F (36.4 °C), temperature source Oral, resp. rate 18, height 6' (1.829 m), weight 180 lb 5.4 oz (81.8 kg), SpO2 96 %. General:    Well nourished. Up to chair, oriented, pleasant, NAD. Head:  Normocephalic, atraumatic  Eyes:  Sclerae appear normal.  Pupils equally round. ENT:  Nares appear normal.  Moist oral mucosa  Neck:  No restricted ROM. Trachea midline   CV:   RRR. No m/r/g. No jugular venous distension. Lungs:   CTAB. No wheezing, rhonchi, or rales. Symmetric expansion. Abdomen:   Soft, nontender, nondistended. Extremities: No cyanosis or clubbing. No edema. PICC RUE. Skin:     No rashes and normal coloration. Warm and dry. Neuro:  CN II-XII grossly intact. Psych:  Normal mood and affect.       I have personally reviewed labs and tests:  Recent Labs:  Recent Results (from the past 48 hour(s))   Basic Metabolic Panel    Collection Time: 02/23/23  4:50 AM   Result Value Ref Range    Sodium 139 133 - 143 mmol/L    Potassium 4.1 3.5 - 5.1 mmol/L    Chloride 110 101 - 110 mmol/L    CO2 25 21 - 32 mmol/L    Anion Gap 4 2 - 11 mmol/L    Glucose 114 (H) 65 - 100 mg/dL    BUN 10 6 - 23 MG/DL    Creatinine 0.60 (L) 0.8 - 1.5 MG/DL    Est, Glom Filt Rate >60 >60 ml/min/1.73m2    Calcium 8.8 8.3 - 10.4 MG/DL   Magnesium    Collection Time: 02/23/23  4:50 AM   Result Value Ref Range    Magnesium 2.1 1.8 - 2.4 mg/dL   Phosphorus    Collection Time: 02/23/23  4:50 AM   Result Value Ref Range    Phosphorus 3.3 2.5 - 4.5 MG/DL   Triglyceride    Collection Time: 02/23/23  4:50 AM   Result Value Ref Range    Triglycerides 184 (H) 35 - 150 MG/DL   Basic Metabolic Panel    Collection Time: 02/24/23  5:13 AM   Result Value Ref Range    Sodium 139 133 - 143 mmol/L    Potassium 4.1 3.5 - 5.1 mmol/L    Chloride 110 101 - 110 mmol/L    CO2 25 21 - 32 mmol/L    Anion Gap 4 2 - 11 mmol/L    Glucose 87 65 - 100 mg/dL    BUN 11 6 - 23 MG/DL    Creatinine 0.70 (L) 0.8 - 1.5 MG/DL    Est, Glom Filt Rate >60 >60 ml/min/1.73m2    Calcium 9.5 8.3 - 10.4 MG/DL   Magnesium    Collection Time: 02/24/23  5:13 AM   Result Value Ref Range    Magnesium 2.2 1.8 - 2.4 mg/dL   Phosphorus    Collection Time: 02/24/23  5:13 AM   Result Value Ref Range    Phosphorus 3.9 2.5 - 4.5 MG/DL   CBC with Auto Differential    Collection Time: 02/24/23  5:13 AM   Result Value Ref Range    WBC 6.3 4.3 - 11.1 K/uL    RBC 4.33 4.23 - 5.6 M/uL    Hemoglobin 13.5 (L) 13.6 - 17.2 g/dL    Hematocrit 39.3 (L) 41.1 - 50.3 %    MCV 90.8 82 - 102 FL    MCH 31.2 26.1 - 32.9 PG    MCHC 34.4 31.4 - 35.0 g/dL    RDW 12.2 11.9 - 14.6 %    Platelets 416 412 - 523 K/uL    MPV 10.1 9.4 - 12.3 FL    nRBC 0.00 0.0 - 0.2 K/uL    Differential Type AUTOMATED      Seg Neutrophils 43 43 - 78 %    Lymphocytes 41 13 - 44 %    Monocytes 10 4.0 - 12.0 %    Eosinophils % 5 0.5 - 7.8 %    Basophils 1 0.0 - 2.0 %    Immature Granulocytes 0 0.0 - 5.0 %    Segs Absolute 2.7 1.7 - 8.2 K/UL    Absolute Lymph # 2.6 0.5 - 4.6 K/UL    Absolute Mono # 0.7 0.1 - 1.3 K/UL    Absolute Eos # 0.3 0.0 - 0.8 K/UL    Basophils Absolute 0.1 0.0 - 0.2 K/UL    Absolute Immature Granulocyte 0.0 0.0 - 0.5 K/UL       I have personally reviewed imaging studies:  Other Studies:  CT ABDOMEN PELVIS W IV CONTRAST Additional Contrast? None   Final Result   Addendum (preliminary) 2 of 2   ADDENDUM:       Case discussed with MD.       Axial image 29 appears to show fluid in the lumen of the gallbladder    communicating with fluid in the lumen of the colon. This is a subtle    finding;    artifact cannot be completely excluded. Coronal images 23 and 24 show extraluminal fluid density abutting the    superior    margin of the colon where it abuts the gallbladder. Nancy Aden M.D.    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 2. Biliary stent in good position.           Nancy Aden M.D.    2/18/2023 8:58:00 PM          Current Meds:  Current Facility-Administered Medications   Medication Dose Route Frequency    PN-Adult Premix 5/15 - Central   IntraVENous Continuous TPN    PN-Adult Premix 5/15 - Central   IntraVENous Continuous TPN    melatonin tablet 6 mg  6 mg Oral Nightly PRN    fat emulsion 20 % infusion 250 mL  250 mL IntraVENous Once per day on Mon Wed Fri    potassium chloride 20 mEq/50 mL IVPB (Central Line)  20 mEq IntraVENous PRN    Or    potassium chloride 10 mEq/100 mL IVPB (Peripheral Line)  10 mEq IntraVENous PRN    magnesium sulfate 2000 mg in 50 mL IVPB premix  2,000 mg IntraVENous PRN    sodium phosphate 10 mmol in sodium chloride 0.9 % 250 mL IVPB  10 mmol IntraVENous PRN    Or    sodium phosphate 15 mmol in sodium chloride 0.9 % 250 mL IVPB  15 mmol IntraVENous PRN    Or    sodium phosphate 20 mmol in sodium chloride 0.9 % 500 mL IVPB  20 mmol IntraVENous PRN    diphenhydrAMINE (BENADRYL) injection 25 mg  25 mg IntraVENous Nightly PRN    sodium chloride flush 0.9 % injection 5-40 mL  5-40 mL IntraVENous 2 times per day    sodium chloride flush 0.9 % injection 5-40 mL  5-40 mL IntraVENous PRN    0.9 % sodium chloride infusion  25 mL IntraVENous PRN    HYDROmorphone (DILAUDID) injection 1 mg  1 mg IntraVENous Q4H PRN    HYDROmorphone (DILAUDID) injection 0.5 mg  0.5 mg IntraVENous Q4H PRN    sodium chloride flush 0.9 % injection 5-40 mL  5-40 mL IntraVENous PRN    0.9 % sodium chloride infusion   IntraVENous PRN    ondansetron (ZOFRAN-ODT) disintegrating tablet 4 mg  4 mg Oral Q8H PRN    Or    ondansetron (ZOFRAN) injection 4 mg  4 mg IntraVENous Q6H PRN    acetaminophen (TYLENOL) tablet 650 mg  650 mg Oral Q6H PRN    piperacillin-tazobactam (ZOSYN) 3,375 mg in sodium chloride 0.9 % 50 mL IVPB (mini-bag)  3,375 mg IntraVENous q8h    pantoprazole (PROTONIX) 40 mg in sodium chloride (PF) 0.9 % 10 mL injection  40 mg IntraVENous Daily       Signed:  Aj Atkinson MD    Part of this note may have been written by using a voice dictation software. The note has been proof read but may still contain some grammatical/other typographical errors.

## 2023-02-24 NOTE — CARE COORDINATION
LOS 6d    Chart reviewed by [facility] and discussed in IDR. Patient admitted with c/o abdominal pain. Started on supportive care/empiric abx. NPO/TPN. General surgery following. CM following for supportive needs.

## 2023-02-24 NOTE — PROGRESS NOTES
H&P/Consult Note/Progress Note/Office Note:   Kenneth Suggs MRN: [id number]  :1965  Age:57 y.o.    HPI: Kenneth Suggs is a 61 y.o. male who was admitted by the hospitalist on 23 after he presented with epigastric pain. He reported severe nausea beginning in 2023 with p.o. intake  This was associated with upper abdominal discomfort and dark urine  He was prescribed Protonix by his primary care at first which did not help  He then had US below and was referred to GI    23 RUQ [facility] US ([facility])  Liver: Intrahepatic biliary duct dilatation is present. No focal liver lesions visualized. Gallbladder: The gallbladder is distended and contains sludge. No pericholecystic fluid. No sonographic Pop sign. Common bile duct: Dilated to 10 mm. Pancreas: The body the pancreas is seen and there is pancreatic duct dilatation to 4 mm in the body the pancreas. The head and tail the pancreas are not adequately visualized by ultrasound. Right kidney: Normal.     IMPRESSION:     Common duct dilatation, intrahepatic biliary duct dilatation, and gallbladder distention likely represents biliary obstruction. Superimposed pancreatic duct dilatation is also present. This constellation of findings can be seen in the setting of a pancreatic head mass. The head of the pancreas is not adequately seen on today's exam.   I would recommend pancreatic mass protocol CT and gastroenterology consultation. 23 ERCP with stent; Dr Fifi Boyle:  Biliary stricture just proximal to ampulla. Could visualize this area post papillotomy. Sludge and cloudy bile extracted. Able to pass 12 mm balloon with resistance. Good drainage with stent. Pancreas- not able to cannulate. GB- ? Sludge vs small stones  Biggest concern would a small pancreatic head tumor          23 EUS; Dr Morse Locus:   1.  Extensive pancreatic parenchymal abnormalities are consistent with chronic pancreatitis based on EUS criteria. It should be noted that the presence of chronic pancreatitis decreases the sensitivity of EUS for detection of pancreatic tumors. 2. 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 FNA was performed. 3. Pancreatic cyst. Morphologic features are most typical for pseudocyst. This is currently too small to warrant FNA. Soon after the EUS that he developed worsening abdominal pain and came to the ER. He denies prior abdominal surgery. He is not taking blood thinners. In ER on 2/19/23 wbc 15.8k, hgb 16.7, plt 306k  T bili 1.4, AST/ALT 12/42, alk [id number]  Lipase 1088  Gen Surgery was consulted after the below CT       2/18/23 CT abd/pelvis with IV contrast    Hx: upper abd pain s/p ERCP     Lower Chest: Scattered atelectasis in the lung bases. Liver: Normal.     Gallbladder/Billary: The gallbladder is inflamed. 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 A biliary stent is in good position. Pancreas: Normal.     Spleen: Normal.     Adrenal Glands: Normal.     Kidneys: Normal.     GI Tract: The stomach and duodenum are unremarkable. Normal appendix. Normal small bowel. Mesentery/Peritoneum: Hazy edema and inflammation in the upper central mesentery. No free intraperitoneal air. Vasculature: Normal.     Lymph Nodes: Normal.     Abdominal Wall: See musculoskeletal section. Bladder: Normal.     Reproductive: Normal.     Musculoskeletal: Normal.     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 2. Biliary stent in good position       Additional hx:  2/21/23 feels much better; still with some epigastric pain; ambulating in room  2/23/23 abd pain improved; AF; TPN/NPO;  IV Zosyn for cholecysto-colic fistula on CT  1/59/24 feels better; AF; WBC 6.3k;  On TPN for pancreatitis; repeat CT planned Monday am and surgery Monday afternoon          Past Medical History:   Diagnosis Date    GERD (gastroesophageal reflux disease)     medication    Sleep apnea     has a CPAP but does not use     Past Surgical History:   Procedure Laterality Date    COLONOSCOPY      ERCP N/A 2/1/2023    ERCP SPHINCTER/PAPILLOTOMY/BALLOON SWEEP/STENT PLACEMENT performed by Evan Vera MD at [facility] ENDOSCOPY    ERCP W/ PLASTIC STENT PLACEMENT  02/01/2023    SINUS SURGERY      x3    VASECTOMY       Current Facility-Administered Medications   Medication Dose Route Frequency    PN-Adult Premix 5/15 - Central   IntraVENous Continuous TPN    PN-Adult Premix 5/15 - Central   IntraVENous Continuous TPN    melatonin tablet 6 mg  6 mg Oral Nightly PRN    fat emulsion 20 % infusion 250 mL  250 mL IntraVENous Once per day on Mon Wed Fri    potassium chloride 20 mEq/50 mL IVPB (Central Line)  20 mEq IntraVENous PRN    Or    potassium chloride 10 mEq/100 mL IVPB (Peripheral Line)  10 mEq IntraVENous PRN    magnesium sulfate 2000 mg in 50 mL IVPB premix  2,000 mg IntraVENous PRN    sodium phosphate 10 mmol in sodium chloride 0.9 % 250 mL IVPB  10 mmol IntraVENous PRN    Or    sodium phosphate 15 mmol in sodium chloride 0.9 % 250 mL IVPB  15 mmol IntraVENous PRN    Or    sodium phosphate 20 mmol in sodium chloride 0.9 % 500 mL IVPB  20 mmol IntraVENous PRN    diphenhydrAMINE (BENADRYL) injection 25 mg  25 mg IntraVENous Nightly PRN    sodium chloride flush 0.9 % injection 5-40 mL  5-40 mL IntraVENous 2 times per day    sodium chloride flush 0.9 % injection 5-40 mL  5-40 mL IntraVENous PRN    0.9 % sodium chloride infusion  25 mL IntraVENous PRN    HYDROmorphone (DILAUDID) injection 1 mg  1 mg IntraVENous Q4H PRN    HYDROmorphone (DILAUDID) injection 0.5 mg  0.5 mg IntraVENous Q4H PRN    sodium chloride flush 0.9 % injection 5-40 mL  5-40 mL IntraVENous PRN    0.9 % sodium chloride infusion   IntraVENous PRN    ondansetron (ZOFRAN-ODT) disintegrating tablet 4 mg  4 mg Oral Q8H PRN    Or    ondansetron [facility] injection 4 mg  4 mg IntraVENous Q6H PRN    acetaminophen (TYLENOL) tablet 650 mg  650 mg Oral Q6H PRN    piperacillin-tazobactam (ZOSYN) 3,375 mg in sodium chloride 0.9 % 50 mL IVPB (mini-bag)  3,375 mg IntraVENous q8h    pantoprazole (PROTONIX) 40 mg in sodium chloride (PF) 0.9 % 10 mL injection  40 mg IntraVENous Daily     ALLERGIES:  Patient has no known allergies. Social History     Socioeconomic History    Marital status:    Tobacco Use    Smoking status: Every Day     Packs/day: 0.50     Years: 40.00     Pack years: 20.00     Types: Cigarettes    Smokeless tobacco: Never   Vaping Use    Vaping Use: Never used   Substance and Sexual Activity    Alcohol use: Not Currently    Drug use: Not Currently     Social History     Tobacco Use   Smoking Status Every Day    Packs/day: 0.50    Years: 40.00    Pack years: 20.00    Types: Cigarettes   Smokeless Tobacco Never     No family history on file. ROS: The patient has no difficulty with chest pain or shortness of breath. No fever or chills. Comprehensive review of systems was otherwise unremarkable except as noted above. Physical Exam:   /83   Pulse 59   Temp 97.5 °F (36.4 °C) (Oral)   Resp 18   Ht 6' (1.829 m)   Wt 180 lb 5.4 oz (81.8 kg)   SpO2 96%   BMI 24.46 kg/m²   Vitals:    02/24/23 0519 02/24/23 0624 02/24/23 0810 02/24/23 1114   BP:   107/76 104/83   Pulse:   60 59   Resp: 16  18 18   Temp:   97.5 °F (36.4 °C) 97.5 °F (36.4 °C)   TempSrc:   Oral Oral   SpO2:   95% 96%   Weight:  180 lb 5.4 oz (81.8 kg)     Height:         02/24 0701 - 02/24 1900  In: 240 [P.O.:240]  Out: 225 [Urine:225]  02/22 1901 - 02/24 0700  In: 3424.4 [I.V.:3424.4]  Out: 6296 [Urine:4275]    Constitutional: Alert, oriented, cooperative patient in no acute distress; appears stated age    Eyes:Sclera are clear. EOMs intact  ENMT: no external lesions gross hearing normal; no obvious neck masses, no ear or lip lesions, nares normal  CV: RRR.  Normal perfusion  Resp: No JVD. Breathing is  non-labored; no audible wheezing. GI: soft and non-distended, epigastric pain is improving       Musculoskeletal: unremarkable with normal function. No embolic signs or cyanosis. Neuro:  Oriented; moves all 4; no focal deficits  Psychiatric: normal affect and mood, no memory impairment    Recent vitals (if inpt):  Patient Vitals for the past 24 hrs:   BP Temp Temp src Pulse Resp SpO2 Weight   02/24/23 1114 104/83 97.5 °F (36.4 °C) Oral 59 18 96 % --   02/24/23 0810 107/76 97.5 °F (36.4 °C) Oral 60 18 95 % --   02/24/23 0624 -- -- -- -- -- -- 180 lb 5.4 oz (81.8 kg)   02/24/23 0519 -- -- -- -- 16 -- --   02/24/23 0449 -- -- -- -- 18 -- --   02/24/23 0413 103/71 97.7 °F (36.5 °C) Oral 59 18 96 % --   02/24/23 0008 -- -- -- -- 16 -- --   02/23/23 2338 -- -- -- -- 18 -- --   02/23/23 2331 119/84 97.5 °F (36.4 °C) Oral 61 18 97 % --   02/23/23 2116 127/77 98 °F (36.7 °C) Oral 57 18 97 % --   02/23/23 1925 -- -- -- 60 18 -- --   02/23/23 1552 123/88 -- Oral 56 18 96 % --       Amount and/or Complexity of Data Reviewed and Analyzed:  I reviewed and analyzed all of the unique labs and radiologic studies that are shown below as well as any that are in the HPI, and any that are in the expanded problem list below  *Each unique test, order, or document contributes to the combination of 2 or combination of 3 in Category 1 below. For this visit I also reviewed old records and prior notes. Recent Labs     02/21/23  1456 02/22/23  0455 02/24/23  0513   WBC  --   --  6.3   HGB  --   --  13.5*   PLT  --   --  293   NA  --    < > 139   K  --    < > 4.1   CL  --    < > 110   CO2  --    < > 25   BUN  --    < > 11   ALT 19  --   --     < > = values in this interval not displayed.      Review of most recent CBC  Lab Results   Component Value Date    WBC 6.3 02/24/2023    HGB 13.5 (L) 02/24/2023    HCT 39.3 (L) 02/24/2023    MCV 90.8 02/24/2023     02/24/2023       Review of most recent BMP  Lab Results   Component Value Date/Time     02/24/2023 05:13 AM    K 4.1 02/24/2023 05:13 AM     02/24/2023 05:13 AM    CO2 25 02/24/2023 05:13 AM    BUN 11 02/24/2023 05:13 AM    CREATININE 0.70 02/24/2023 05:13 AM    GLUCOSE 87 02/24/2023 05:13 AM    CALCIUM 9.5 02/24/2023 05:13 AM        Review of most recent LFTs (and lipase if done)  Lab Results   Component Value Date    ALT 19 02/21/2023    AST 14 (L) 02/21/2023    ALKPHOS 80 02/21/2023    BILITOT 1.1 02/21/2023     Lab Results   Component Value Date    LIPASE 130 02/21/2023       No results found for: INR, APTT, LCAD    Review of most recent HgbA1c  No results found for: LABA1C    Nutritional assessment screen for wound healing issues:  Lab Results   Component Value Date    LABALBU 2.6 (L) 02/21/2023       @lastcovr@    Xray Result (most recent):  No results found for this or any previous visit from the past 3650 days. CT Result (most recent):  CT ABDOMEN PELVIS W IV CONTRAST 02/18/2023    Addendum 2/18/2023 10:25 PM  ADDENDUM:    Case discussed with MD.    Axial image 29 appears to show fluid in the lumen of the gallbladder  communicating with fluid in the lumen of the colon. This is a subtle finding;  artifact cannot be completely excluded. Coronal images 23 and 24 show extraluminal fluid density abutting the superior  margin of the colon where it abuts the gallbladder. Karena Benitez M.D.  2/18/2023 10:25:00 PM    Addendum 2/18/2023  9:07 PM  ADDENDUM:    Whit August 89    Karena Benitez M.D.  2/18/2023 9:07:00 PM    Narrative  EXAMINATION: CT SCAN OF THE ABDOMEN AND PELVIS WITH INTRAVENOUS CONTRAST    DATE OF EXAM: 2/18/2023 5:15 PM    HISTORY: upper abd pain s/p ERCP    COMPARISON: None. TECHNIQUE: CT examination of the abdomen and pelvis was performed following the  intravenous administration of 100 mL of Isovue-370.  CT dose lowering techniques  were used, to include: automated exposure control, adjustment for patient size,  and/or use of iterative reconstruction.    FINDINGS:    ABDOMEN/PELVIS:    Lower Chest: Scattered atelectasis in the lung bases.    Liver: Normal.    Gallbladder/Billary: The gallbladder is inflamed. 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 A biliary  stent is in good position.    Pancreas: Normal.    Spleen: Normal.    Adrenal Glands: Normal.    Kidneys: Normal.    GI Tract: The stomach and duodenum are unremarkable. Normal appendix. Normal  small bowel.    Mesentery/Peritoneum: Hazy edema and inflammation in the upper central  mesentery. No free intraperitoneal air.    Vasculature: Normal.    Lymph Nodes: Normal.    Abdominal Wall: See musculoskeletal section.    Bladder: Normal.    Reproductive: Normal.    Musculoskeletal: Normal.    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  2. Biliary stent in good position.      Mychal Blankenship M.D.  2/18/2023 8:58:00 PM    US Result (most recent):  No results found for this or any previous visit from the past 3650 days.      Admission date (for inpatients): 2/18/2023   * No surgery found *  * No surgery found *        ASSESSMENT/PLAN:  [unfilled]  Principal Problem:    Post-ERCP acute pancreatitis  Active Problems:    Biliary stricture    Abdominal pain    Hyponatremia    Tobacco use    Pancreatitis  Resolved Problems:    Sepsis (HCC)     Patient Active Problem List    Diagnosis Date Noted    Post-ERCP acute pancreatitis 02/22/2023     Priority: Medium    Biliary stricture 02/18/2023     Priority: Medium    Abdominal pain 02/18/2023     Priority: Medium    Hyponatremia 02/18/2023     Priority: Medium    Tobacco use 02/18/2023     Priority: Medium    Pancreatitis 02/18/2023     Priority: Medium          Number and Complexity of Problems addressed and   Risks of complications and/or morbidity of management            Acute Pancreatitis after EUS and FNA of pancreas  Continue NPO  TPN  via PICC   Morphine ordered for pain  FNA biopsy showed no evidence of malignancy  Etiology of distal CBD stricture most likely multifactorial recurrent pancreatitis (alcohol, gallstones/CBD stone)      Abnormal GB on CT  Possible cholecysto-colic fistula vs air in GB after ERCP and stent with surrounding pancreatic edema  It appears from the ERCP images that the cystic duct was patent at that time    IV Zosyn  Follow  Await resolution of acute pancreatitis and peripancreatic edema    Informed consent was obtained for laparoscopic possible open cholecystectomy, possible colon resection,  Procedure risks were discussed including bleeding, infection, hernia, SBO, fistula, bile duct injury, bile leak,   the need for open incision, the need for ostomy, blood clots, risk of anesthesia, etc.. All questions answered. He is in favor proceeding. Repeat CT ordered for Monday am to reassess  Surgery planned Monday afternoon                    Level of MDM (2/3 elements below)  Number and Complexity of Problems Addressed Amount and/or Complexity of Data to be Reviewed and Analyzed  *Each unique test, order, or document contributes to the combination of 2 or combination of 3 in Category 1 below. Risk of Complications and/or Morbidity or Mortality of pt Management     59059  27079 SF Minimal  ?1self-limited or minor problem Minimal or none Minimal risk of morbidity from additional diagnostic testing or Rx   42649  14961 Low Low  ? 2or more self-limited or minor problems;    or  ? 1stable chronic illness;    or  ?1acute, uncomplicated illness or injury   Limited  (Must meet the requirements of at least 1 of the 2 categories)  Category 1: Tests and documents   ? Any combination of 2 from the following:  ?Review of prior external note(s) from each unique source*;  ?review of the result(s) of each unique test*;   ?ordering of each unique test*    or   Category 2: Assessment requiring an independent historian(s)  (For the categories of independent interpretation of tests and discussion of management or test interpretation, see moderate or high) Low risk of morbidity from additional diagnostic testing or treatment     28069  74198 Mod Moderate  ? 1or more chronic illnesses with exacerbation, progression, or side effects of treatment;    or  ?2or more stable chronic illnesses;    or  ?1undiagnosed new problem with uncertain prognosis;    or  ?1acute illness with systemic symptoms;    or  ?1acute complicated injury   Moderate  (Must meet the requirements of at least 1 out of 3 categories)  Category 1: Tests, documents, or independent historian(s)  ? Any combination of 3 from the following:   ?Review of prior external note(s) from each unique source*;  ?Review of the result(s) of each unique test*;  ?Ordering of each unique test*;  ?Assessment requiring an independent historian(s)    or  Category 2: Independent interpretation of tests   ? Independent interpretation of a test performed by another physician/other qualified health care professional (not separately reported);     or  Category 3: Discussion of management or test interpretation  ? Discussion of management or test interpretation with external physician/other qualified health care professional/appropriate source (not separately reported)   Moderate risk of morbidity from additional diagnostic testing or treatment  Examples only:  ?Prescription drug management   ? Decision regarding minor surgery with identified patient or procedure risk factors  ? Decision regarding elective major surgery without identified patient or procedure risk factors   ? Diagnosis or treatment significantly limited by social determinants of health       01905 [id number] High High  ? 1or more chronic illnesses with severe exacerbation, progression, or side effects of treatment;    or  ?1 acute or chronic illness or injury that poses a threat to life or bodily function   Extensive  (Must meet the requirements of at least 2 out of 3 categories)  Category 1: Tests, documents, or independent historian(s)  ? Any combination of 3 from the following:   ?Review of prior external note(s) from each unique source*;  ?Review of the result(s) of each unique test*;   ?Ordering of each unique test*;   ?Assessment requiring an independent historian(s)    or   Category 2: Independent interpretation of tests   ? Independent interpretation of a test performed by another physician/other qualified health care professional (not separately reported);     or  Category 3: Discussion of management or test interpretation  ? Discussion of management or test interpretation with external physician/other qualified health care professional/appropriate source (not separately reported)   High risk of morbidity from additional diagnostic testing or treatment  Examples only:  ?Drug therapy requiring intensive monitoring for toxicity  ? Decision regarding elective major surgery with identified patient or procedure risk factors  ? Decision regarding emergency major surgery  ? Decision regarding hospitalization  ? Decision not to resuscitate or to de-escalate care because of poor prognosis      Parenteral controlled substances             I have personally performed a face-to-face diagnostic evaluation and management  service on this patient. I have independently seen the patient. I have independently obtained the above history from the patient/family. I have independently examined the patient with above findings. I have independently reviewed data/labs for this patient and developed the above plan of care (MDM).       Signed: Sandro Huntley MD  2/24/2023    1:38 PM

## 2023-02-24 NOTE — PROGRESS NOTES
END OF SHIFT NOTE:    INTAKE/OUTPUT  02/23 0701 - 02/24 0700  In: 2017.4 [I.V.:2017.4]  Out: 2000 [Urine:2000]  Voiding: Yes  Catheter: No  Drain:              Flatus: Patient does have flatus present. Stool: 3 occurrences. liquid  Characteristics:  Stool Appearance:  (No stool to assess this AM)  Stool Color: Unable to assess  Stool Amount: Unable to assess  Stool Assessment  Stool Appearance:  (No stool to assess this AM)  Stool Color: Unable to assess  Stool Amount: Unable to assess  Last BM (including prior to admit): 02/23/23    Emesis: 0 occurrences. Characteristics:        VITAL SIGNS  Patient Vitals for the past 12 hrs:   Temp Pulse Resp BP SpO2   02/24/23 0519 -- -- 16 -- --   02/24/23 0449 -- -- 18 -- --   02/24/23 0413 97.7 °F (36.5 °C) 59 18 103/71 96 %   02/24/23 0008 -- -- 16 -- --   02/23/23 2338 -- -- 18 -- --   02/23/23 2331 97.5 °F (36.4 °C) 61 18 119/84 97 %   02/23/23 2116 98 °F (36.7 °C) 57 18 127/77 97 %   02/23/23 1925 -- 60 18 -- --       Pain Assessment  Pain Level: 0 (02/24/23 0519)  Pain Location: Back, Abdomen  Patient's Stated Pain Goal: 0 - No pain    Ambulating  Yes    Shift report given to oncoming nurse at the bedside.     Xin Bautista RN

## 2023-02-24 NOTE — PROGRESS NOTES
Reviewed notes for new spiritual concerns      Will continue to assess how we can best serve this family        [place].       Per notes:       [place]    LOCAL    SPOUSE - PUMA    FULL CODE    NO ACP    LOS  6  DAYS

## 2023-02-24 NOTE — PROGRESS NOTES
H&P/Consult Note/Progress Note/Office Note:   Willis Garcia MRN: [id number]  :1965  Age:57 y.o.    HPI: Willis Garcia is a 61 y.o. male who was admitted by the hospitalist on 23 after he presented with epigastric pain. He reported severe nausea beginning in 2023 with p.o. intake  This was associated with upper abdominal discomfort and dark urine  He was prescribed Protonix by his primary care at first which did not help  He then had US below and was referred to GI    23 RUQ abd US ([place])  Liver: Intrahepatic biliary duct dilatation is present. No focal liver lesions visualized. Gallbladder: The gallbladder is distended and contains sludge. No pericholecystic fluid. No sonographic Pop sign. Common bile duct: Dilated to 10 mm. Pancreas: The body the pancreas is seen and there is pancreatic duct dilatation to 4 mm in the body the pancreas. The head and tail the pancreas are not adequately visualized by ultrasound. Right kidney: Normal.     IMPRESSION:     Common duct dilatation, intrahepatic biliary duct dilatation, and gallbladder distention likely represents biliary obstruction. Superimposed pancreatic duct dilatation is also present. This constellation of findings can be seen in the setting of a pancreatic head mass. The head of the pancreas is not adequately seen on today's exam.   I would recommend pancreatic mass protocol CT and gastroenterology consultation. 23 ERCP with stent; Dr America Brown:  Biliary stricture just proximal to ampulla. Could visualize this area post papillotomy. Sludge and cloudy bile extracted. Able to pass 12 mm balloon with resistance. Good drainage with stent. Pancreas- not able to cannulate. GB- ? Sludge vs small stones  Biggest concern would a small pancreatic head tumor          23 EUS; Dr Coleman [place]:   1.  Extensive pancreatic parenchymal abnormalities are consistent with chronic pancreatitis based on EUS criteria. It should be noted that the presence of chronic pancreatitis decreases the sensitivity of EUS for detection of pancreatic tumors. 2. 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 FNA was performed. 3. Pancreatic cyst. Morphologic features are most typical for pseudocyst. This is currently too small to warrant FNA. Soon after the EUS that he developed worsening abdominal pain and came to the ER. He denies prior abdominal surgery. He is not taking blood thinners. In ER on 2/19/23 wbc 15.8k, hgb 16.7, plt 306k  T bili 1.4, AST/ALT 12/42, alk [id number]  Lipase 1088  Gen Surgery was consulted after the below CT       2/18/23 CT abd/pelvis with IV contrast    Hx: upper abd pain s/p ERCP     Lower Chest: Scattered atelectasis in the lung bases. Liver: Normal.     Gallbladder/Billary: The gallbladder is inflamed. 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 A biliary stent is in good position. Pancreas: Normal.     Spleen: Normal.     Adrenal Glands: Normal.     Kidneys: Normal.     GI Tract: The stomach and duodenum are unremarkable. Normal appendix. Normal small bowel. Mesentery/Peritoneum: Hazy edema and inflammation in the upper central mesentery. No free intraperitoneal air. Vasculature: Normal.     Lymph Nodes: Normal.     Abdominal Wall: See musculoskeletal section. Bladder: Normal.     Reproductive: Normal.     Musculoskeletal: Normal.     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    2. Biliary stent in good position       Additional hx:  2/21/23 feels much better; still with some epigastric pain; ambulating in room            Past Medical History:   Diagnosis Date    GERD (gastroesophageal reflux disease)     medication    Sleep apnea     has a CPAP but does not use     Past Surgical History:   Procedure Laterality Date    COLONOSCOPY ERCP N/A 2/1/2023    ERCP SPHINCTER/PAPILLOTOMY/BALLOON SWEEP/STENT PLACEMENT performed by Kenzie Olsen MD at [facility] ENDOSCOPY    ERCP W/ PLASTIC STENT PLACEMENT  02/01/2023    SINUS SURGERY      x3    VASECTOMY       Current Facility-Administered Medications   Medication Dose Route Frequency    PN-Adult Premix 5/15 - Central   IntraVENous Continuous TPN    melatonin tablet 6 mg  6 mg Oral Nightly PRN    fat emulsion 20 % infusion 250 mL  250 mL IntraVENous Once per day on Mon Wed Fri    potassium chloride 20 mEq/50 mL IVPB (Central Line)  20 mEq IntraVENous PRN    Or    potassium chloride 10 mEq/100 mL IVPB (Peripheral Line)  10 mEq IntraVENous PRN    magnesium sulfate 2000 mg in 50 mL IVPB premix  2,000 mg IntraVENous PRN    sodium phosphate 10 mmol in sodium chloride 0.9 % 250 mL IVPB  10 mmol IntraVENous PRN    Or    sodium phosphate 15 mmol in sodium chloride 0.9 % 250 mL IVPB  15 mmol IntraVENous PRN    Or    sodium phosphate 20 mmol in sodium chloride 0.9 % 500 mL IVPB  20 mmol IntraVENous PRN    diphenhydrAMINE (BENADRYL) injection 25 mg  25 mg IntraVENous Nightly PRN    sodium chloride flush 0.9 % injection 5-40 mL  5-40 mL IntraVENous 2 times per day    sodium chloride flush 0.9 % injection 5-40 mL  5-40 mL IntraVENous PRN    0.9 % sodium chloride infusion  25 mL IntraVENous PRN    HYDROmorphone (DILAUDID) injection 1 mg  1 mg IntraVENous Q4H PRN    HYDROmorphone (DILAUDID) injection 0.5 mg  0.5 mg IntraVENous Q4H PRN    sodium chloride flush 0.9 % injection 5-40 mL  5-40 mL IntraVENous PRN    0.9 % sodium chloride infusion   IntraVENous PRN    ondansetron (ZOFRAN-ODT) disintegrating tablet 4 mg  4 mg Oral Q8H PRN    Or    ondansetron (ZOFRAN) injection 4 mg  4 mg IntraVENous Q6H PRN    acetaminophen (TYLENOL) tablet 650 mg  650 mg Oral Q6H PRN    piperacillin-tazobactam (ZOSYN) 3,375 mg in sodium chloride 0.9 % 50 mL IVPB (mini-bag)  3,375 mg IntraVENous q8h    pantoprazole (PROTONIX) 40 mg in sodium chloride (PF) 0.9 % 10 mL injection  40 mg IntraVENous Daily     ALLERGIES:  Patient has no known allergies. Social History     Socioeconomic History    Marital status:    Tobacco Use    Smoking status: Every Day     Packs/day: 0.50     Years: 40.00     Pack years: 20.00     Types: Cigarettes    Smokeless tobacco: Never   Vaping Use    Vaping Use: Never used   Substance and Sexual Activity    Alcohol use: Not Currently    Drug use: Not Currently     Social History     Tobacco Use   Smoking Status Every Day    Packs/day: 0.50    Years: 40.00    Pack years: 20.00    Types: Cigarettes   Smokeless Tobacco Never     No family history on file. ROS: The patient has no difficulty with chest pain or shortness of breath. No fever or chills. Comprehensive review of systems was otherwise unremarkable except as noted above. Physical Exam:   /77   Pulse 57   Temp 98 °F (36.7 °C) (Oral)   Resp 18   Ht 6' (1.829 m)   Wt 182 lb (82.6 kg)   SpO2 97%   BMI 24.68 kg/m²   Vitals:    02/23/23 0814 02/23/23 1102 02/23/23 1552 02/23/23 2116   BP: 105/72 100/67 123/88 127/77   Pulse: 55 69 56 57   Resp: 18 18 18 18   Temp: 97.3 °F (36.3 °C) 97.5 °F (36.4 °C)  98 °F (36.7 °C)   TempSrc: Oral Oral Oral Oral   SpO2:  96% 96% 97%   Weight:       Height:         No intake/output data recorded. 02/22 0701 - 02/23 1900  In: 4072.3 [I.V.:4072.3]  Out: 2525 [Urine:2525]    Constitutional: Alert, oriented, cooperative patient in no acute distress; appears stated age    Eyes:Sclera are clear. EOMs intact  ENMT: no external lesions gross hearing normal; no obvious neck masses, no ear or lip lesions, nares normal  CV: RRR. Normal perfusion  Resp: No JVD. Breathing is  non-labored; no audible wheezing. GI: soft and non-distended, epigastric pain is improving       Musculoskeletal: unremarkable with normal function. No embolic signs or cyanosis.    Neuro:  Oriented; moves all 4; no focal deficits  Psychiatric: normal affect and mood, no memory impairment    Recent vitals (if inpt):  Patient Vitals for the past 24 hrs:   BP Temp Temp src Pulse Resp SpO2 Weight   02/23/23 2116 127/77 98 °F (36.7 °C) Oral 57 18 97 % --   02/23/23 1552 123/88 -- Oral 56 18 96 % --   02/23/23 1102 100/67 97.5 °F (36.4 °C) Oral 69 18 96 % --   02/23/23 0814 105/72 97.3 °F (36.3 °C) Oral 55 18 -- --   02/23/23 0601 -- -- -- -- -- -- 182 lb (82.6 kg)   02/23/23 0417 114/82 97.5 °F (36.4 °C) Oral 61 18 93 % --   02/22/23 2350 114/71 97.7 °F (36.5 °C) Oral 56 18 95 % --       Amount and/or Complexity of Data Reviewed and Analyzed:  I reviewed and analyzed all of the unique labs and radiologic studies that are shown below as well as any that are in the HPI, and any that are in the expanded problem list below  *Each unique test, order, or document contributes to the combination of 2 or combination of 3 in Category 1 below. For this visit I also reviewed old records and prior notes. Recent Labs     02/21/23  1456 02/22/23  0455 02/23/23  0450   NA  --    < > 139   K  --    < > 4.1   CL  --    < > 110   CO2  --    < > 25   BUN  --    < > 10   ALT 19  --   --     < > = values in this interval not displayed.      Review of most recent CBC  Lab Results   Component Value Date    WBC 9.1 02/20/2023    HGB 13.5 (L) 02/20/2023    HCT 39.9 (L) 02/20/2023    MCV 92.8 02/20/2023     02/20/2023       Review of most recent BMP  Lab Results   Component Value Date/Time     02/23/2023 04:50 AM    K 4.1 02/23/2023 04:50 AM     02/23/2023 04:50 AM    CO2 25 02/23/2023 04:50 AM    BUN 10 02/23/2023 04:50 AM    CREATININE 0.60 02/23/2023 04:50 AM    GLUCOSE 114 02/23/2023 04:50 AM    CALCIUM 8.8 02/23/2023 04:50 AM        Review of most recent LFTs (and lipase if done)  Lab Results   Component Value Date    ALT 19 02/21/2023    AST 14 (L) 02/21/2023    ALKPHOS 80 02/21/2023    BILITOT 1.1 02/21/2023     Lab Results   Component Value Date    LIPASE 130 02/21/2023       No results found for: INR, APTT, LCAD    Review of most recent HgbA1c  No results found for: LABA1C    Nutritional assessment screen for wound healing issues:  Lab Results   Component Value Date    LABALBU 2.6 (L) 02/21/2023       @lastcovr@    Xray Result (most recent):  No results found for this or any previous visit from the past 3650 days. CT Result (most recent):  CT ABDOMEN PELVIS W IV CONTRAST 02/18/2023    Addendum 2/18/2023 10:25 PM  ADDENDUM:    Case discussed with MD.    Axial image 29 appears to show fluid in the lumen of the gallbladder  communicating with fluid in the lumen of the colon. This is a subtle finding;  artifact cannot be completely excluded. Coronal images 23 and 24 show extraluminal fluid density abutting the superior  margin of the colon where it abuts the gallbladder. Chiki Yadav M.D.  2/18/2023 10:25:00 PM    Addendum 2/18/2023  9:07 PM  ADDENDUM:    Sheryl Rossi 89    Chiki Yadav M.D.  2/18/2023 9:07:00 PM    Narrative  EXAMINATION: CT SCAN OF THE ABDOMEN AND PELVIS WITH INTRAVENOUS CONTRAST    DATE OF EXAM: 2/18/2023 5:15 PM    HISTORY: upper abd pain s/p ERCP    COMPARISON: None. TECHNIQUE: CT examination of the abdomen and pelvis was performed following the  intravenous administration of 100 mL of Isovue-370. CT dose lowering techniques  were used, to include: automated exposure control, adjustment for patient size,  and/or use of iterative reconstruction. FINDINGS:    ABDOMEN/PELVIS:    Lower Chest: Scattered atelectasis in the lung bases. Liver: Normal.    Gallbladder/Billary: The gallbladder is inflamed. 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 A biliary  stent is in good position. Pancreas: Normal.    Spleen: Normal.    Adrenal Glands: Normal.    Kidneys: Normal.    GI Tract: The stomach and duodenum are unremarkable. Normal appendix. Normal  small bowel.     Mesentery/Peritoneum: FIFI [place] edema and inflammation in the upper central  mesentery. No free intraperitoneal air. Vasculature: Normal.    Lymph Nodes: Normal.    Abdominal Wall: See musculoskeletal section. Bladder: Normal.    Reproductive: Normal.    Musculoskeletal: Normal.    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 2. Biliary stent in good position. Franco Thornton M.D.  2/18/2023 8:58:00 PM    US Result (most recent):  No results found for this or any previous visit from the past 3650 days.       Admission date (for inpatients): 2/18/2023   * No surgery found *  * No surgery found *        ASSESSMENT/PLAN:  [unfilled]  Principal Problem:    Post-ERCP acute pancreatitis  Active Problems:    Biliary stricture    Abdominal pain    Hyponatremia    Tobacco use    Pancreatitis  Resolved Problems:    Sepsis [facility])     Patient Active Problem List    Diagnosis Date Noted    Post-ERCP acute pancreatitis 02/22/2023     Priority: Medium    Biliary stricture 02/18/2023     Priority: Medium    Abdominal pain 02/18/2023     Priority: Medium    Hyponatremia 02/18/2023     Priority: Medium    Tobacco use 02/18/2023     Priority: Medium    Pancreatitis 02/18/2023     Priority: Medium          Number and Complexity of Problems addressed and   Risks of complications and/or morbidity of management            Acute Pancreatitis after EUS and FNA of pancreas  Continue NPO  PPN via PICC   Morphine ordered for pain  FNA biopsy showed no evidence of malignancy  Etiology of distal CBD stricture most likely multifactorial recurrent pancreatitis (alcohol, gallstones/CBD stone)    Transfer to surgical service if OK with Hospitalists      Abnormal GB on CT  Possible cholecysto-colic fistula vs air in GB after ERCP and stent with surrounding pancreatic edema  It appears from the ERCP images that the cystic duct was patent at that time    IV Zosyn  Follow  Await resolution of acute pancreatitis and peripancreatic edema    Considered repeat CT tonight and surgery tomorrow but OR has no rooms available all day (even in the outpt center)  Will call scheduling tomorrow to see if OR can be scheduled Monday afternoon, and if so, CT scan abd/pelvis with oral and IV contrast early Monday am pre-op    TPN/NPO until then                    Level of MDM (2/3 elements below)  Number and Complexity of Problems Addressed Amount and/or Complexity of Data to be Reviewed and Analyzed  *Each unique test, order, or document contributes to the combination of 2 or combination of 3 in Category 1 below. Risk of Complications and/or Morbidity or Mortality of pt Management     13286  04579 SF Minimal  ?1self-limited or minor problem Minimal or none Minimal risk of morbidity from additional diagnostic testing or Rx   43906  69420 Low Low  ? 2or more self-limited or minor problems;    or  ? 1stable chronic illness;    or  ?1acute, uncomplicated illness or injury   Limited  (Must meet the requirements of at least 1 of the 2 categories)  Category 1: Tests and documents   ? Any combination of 2 from the following:  ?Review of prior external note(s) from each unique source*;  ?review of the result(s) of each unique test*;   ?ordering of each unique test*    or   Category 2: Assessment requiring an independent historian(s)  (For the categories of independent interpretation of tests and discussion of management or test interpretation, see moderate or high) Low risk of morbidity from additional diagnostic testing or treatment     89052  36193 Mod Moderate  ? 1or more chronic illnesses with exacerbation, progression, or side effects of treatment;    or  ?2or more stable chronic illnesses;    or  ?1undiagnosed new problem with uncertain prognosis;    or  ?1acute illness with systemic symptoms;    or  ?1acute complicated injury   Moderate  (Must meet the requirements of at least 1 out of 3 categories)  Category 1: Tests, documents, or independent historian(s)  ? Any combination of 3 from the following:   ?Review of prior external note(s) from each unique source*;  ?Review of the result(s) of each unique test*;  ?Ordering of each unique test*;  ?Assessment requiring an independent historian(s)    or  Category 2: Independent interpretation of tests   ? Independent interpretation of a test performed by another physician/other qualified health care professional (not separately reported);     or  Category 3: Discussion of management or test interpretation  ? Discussion of management or test interpretation with external physician/other qualified health care professional/appropriate source (not separately reported)   Moderate risk of morbidity from additional diagnostic testing or treatment  Examples only:  ?Prescription drug management   ? Decision regarding minor surgery with identified patient or procedure risk factors  ? Decision regarding elective major surgery without identified patient or procedure risk factors   ? Diagnosis or treatment significantly limited by social determinants of health       20841  88141 High High  ? 1or more chronic illnesses with severe exacerbation, progression, or side effects of treatment;    or  ?1 acute or chronic illness or injury that poses a threat to life or bodily function   Extensive  (Must meet the requirements of at least 2 out of 3 categories)  Category 1: Tests, documents, or independent historian(s)  ? Any combination of 3 from the following:   ?Review of prior external note(s) from each unique source*;  ?Review of the result(s) of each unique test*;   ?Ordering of each unique test*;   ?Assessment requiring an independent historian(s)    or   Category 2: Independent interpretation of tests   ? Independent interpretation of a test performed by another physician/other qualified health care professional (not separately reported);     or  Category 3: Discussion of management or test interpretation  ? Discussion of management or test interpretation with external physician/other qualified health care professional/appropriate source (not separately reported)   High risk of morbidity from additional diagnostic testing or treatment  Examples only:  ?Drug therapy requiring intensive monitoring for toxicity  ? Decision regarding elective major surgery with identified patient or procedure risk factors  ? Decision regarding emergency major surgery  ? Decision regarding hospitalization  ? Decision not to resuscitate or to de-escalate care because of poor prognosis      Parenteral controlled substances             I have personally performed a face-to-face diagnostic evaluation and management  service on this patient. I have independently seen the patient. I have independently obtained the above history from the patient/family. I have independently examined the patient with above findings. I have independently reviewed data/labs for this patient and developed the above plan of care (MDM).       Signed: Chanell Abel MD  2/23/2023    9:30 PM

## 2023-02-25 LAB
ANION GAP SERPL CALC-SCNC: 2 MMOL/L (ref 2–11)
BASOPHILS # BLD: 0.1 K/UL (ref 0–0.2)
BASOPHILS NFR BLD: 1 % (ref 0–2)
BUN SERPL-MCNC: 14 MG/DL (ref 6–23)
CALCIUM SERPL-MCNC: 9.5 MG/DL (ref 8.3–10.4)
CHLORIDE SERPL-SCNC: 110 MMOL/L (ref 101–110)
CO2 SERPL-SCNC: 24 MMOL/L (ref 21–32)
CREAT SERPL-MCNC: 0.7 MG/DL (ref 0.8–1.5)
DIFFERENTIAL METHOD BLD: NORMAL
EOSINOPHIL # BLD: 0.3 K/UL (ref 0–0.8)
EOSINOPHIL NFR BLD: 6 % (ref 0.5–7.8)
ERYTHROCYTE [DISTWIDTH] IN BLOOD BY AUTOMATED COUNT: 12.2 % (ref 11.9–14.6)
GLUCOSE SERPL-MCNC: 137 MG/DL (ref 65–100)
HCT VFR BLD AUTO: 41.9 % (ref 41.1–50.3)
HGB BLD-MCNC: 14.1 G/DL (ref 13.6–17.2)
IMM GRANULOCYTES # BLD AUTO: 0 K/UL (ref 0–0.5)
IMM GRANULOCYTES NFR BLD AUTO: 0 % (ref 0–5)
LYMPHOCYTES # BLD: 2.1 K/UL (ref 0.5–4.6)
LYMPHOCYTES NFR BLD: 37 % (ref 13–44)
MAGNESIUM SERPL-MCNC: 2.2 MG/DL (ref 1.8–2.4)
MCH RBC QN AUTO: 30.6 PG (ref 26.1–32.9)
MCHC RBC AUTO-ENTMCNC: 33.7 G/DL (ref 31.4–35)
MCV RBC AUTO: 90.9 FL (ref 82–102)
MONOCYTES # BLD: 0.5 K/UL (ref 0.1–1.3)
MONOCYTES NFR BLD: 9 % (ref 4–12)
NEUTS SEG # BLD: 2.7 K/UL (ref 1.7–8.2)
NEUTS SEG NFR BLD: 47 % (ref 43–78)
NRBC # BLD: 0 K/UL (ref 0–0.2)
PHOSPHATE SERPL-MCNC: 3.8 MG/DL (ref 2.5–4.5)
PLATELET # BLD AUTO: 269 K/UL (ref 150–450)
PMV BLD AUTO: 9.6 FL (ref 9.4–12.3)
POTASSIUM SERPL-SCNC: 4.3 MMOL/L (ref 3.5–5.1)
RBC # BLD AUTO: 4.61 M/UL (ref 4.23–5.6)
SODIUM SERPL-SCNC: 136 MMOL/L (ref 133–143)
WBC # BLD AUTO: 5.7 K/UL (ref 4.3–11.1)

## 2023-02-25 PROCEDURE — A4216 STERILE WATER/SALINE, 10 ML: HCPCS | Performed by: INTERNAL MEDICINE

## 2023-02-25 PROCEDURE — 83735 ASSAY OF MAGNESIUM: CPT

## 2023-02-25 PROCEDURE — 6360000002 HC RX W HCPCS: Performed by: INTERNAL MEDICINE

## 2023-02-25 PROCEDURE — 84100 ASSAY OF PHOSPHORUS: CPT

## 2023-02-25 PROCEDURE — 2500000003 HC RX 250 WO HCPCS: Performed by: SURGERY

## 2023-02-25 PROCEDURE — 6360000002 HC RX W HCPCS: Performed by: NURSE PRACTITIONER

## 2023-02-25 PROCEDURE — 1100000003 HC PRIVATE W/ TELEMETRY

## 2023-02-25 PROCEDURE — C9113 INJ PANTOPRAZOLE SODIUM, VIA: HCPCS | Performed by: INTERNAL MEDICINE

## 2023-02-25 PROCEDURE — 1100000000 HC RM PRIVATE

## 2023-02-25 PROCEDURE — 2580000003 HC RX 258: Performed by: SURGERY

## 2023-02-25 PROCEDURE — 80048 BASIC METABOLIC PNL TOTAL CA: CPT

## 2023-02-25 PROCEDURE — 85025 COMPLETE CBC W/AUTO DIFF WBC: CPT

## 2023-02-25 PROCEDURE — 6360000002 HC RX W HCPCS: Performed by: SURGERY

## 2023-02-25 PROCEDURE — 2580000003 HC RX 258: Performed by: INTERNAL MEDICINE

## 2023-02-25 PROCEDURE — 99232 SBSQ HOSP IP/OBS MODERATE 35: CPT | Performed by: SURGERY

## 2023-02-25 RX ADMIN — HYDROMORPHONE HYDROCHLORIDE 1 MG: 1 INJECTION, SOLUTION INTRAMUSCULAR; INTRAVENOUS; SUBCUTANEOUS at 04:38

## 2023-02-25 RX ADMIN — PIPERACILLIN AND TAZOBACTAM 3375 MG: 3; .375 INJECTION, POWDER, LYOPHILIZED, FOR SOLUTION INTRAVENOUS at 20:34

## 2023-02-25 RX ADMIN — SODIUM CHLORIDE, PRESERVATIVE FREE 10 ML: 5 INJECTION INTRAVENOUS at 20:37

## 2023-02-25 RX ADMIN — PANTOPRAZOLE SODIUM 40 MG: 40 INJECTION, POWDER, LYOPHILIZED, FOR SOLUTION INTRAVENOUS at 08:40

## 2023-02-25 RX ADMIN — SODIUM CHLORIDE, PRESERVATIVE FREE 10 ML: 5 INJECTION INTRAVENOUS at 08:41

## 2023-02-25 RX ADMIN — PIPERACILLIN AND TAZOBACTAM 3375 MG: 3; .375 INJECTION, POWDER, LYOPHILIZED, FOR SOLUTION INTRAVENOUS at 11:49

## 2023-02-25 RX ADMIN — PIPERACILLIN AND TAZOBACTAM 3375 MG: 3; .375 INJECTION, POWDER, LYOPHILIZED, FOR SOLUTION INTRAVENOUS at 04:22

## 2023-02-25 RX ADMIN — DIPHENHYDRAMINE HYDROCHLORIDE 25 MG: 50 INJECTION, SOLUTION INTRAMUSCULAR; INTRAVENOUS at 20:35

## 2023-02-25 RX ADMIN — POTASSIUM PHOSPHATE, MONOBASIC POTASSIUM PHOSPHATE, DIBASIC: 224; 236 INJECTION, SOLUTION, CONCENTRATE INTRAVENOUS at 17:33

## 2023-02-25 ASSESSMENT — PAIN DESCRIPTION - LOCATION: LOCATION: ABDOMEN

## 2023-02-25 ASSESSMENT — PAIN DESCRIPTION - DESCRIPTORS: DESCRIPTORS: ACHING

## 2023-02-25 ASSESSMENT — PAIN SCALES - GENERAL
PAINLEVEL_OUTOF10: 8
PAINLEVEL_OUTOF10: 4

## 2023-02-25 ASSESSMENT — PAIN DESCRIPTION - ORIENTATION: ORIENTATION: ANTERIOR

## 2023-02-25 NOTE — PROGRESS NOTES
H&P/Consult Note/Progress Note/Office Note:   Pearl Canavan. MRN: [id number]  :1965  Age:57 y.o.    HPI: Pearl Canavan. is a [id number] y.o. male who was admitted by the hospitalist on 23 after he presented with epigastric pain. He reported severe nausea beginning in 2023 with p.o. intake  This was associated with upper abdominal discomfort and dark urine  He was prescribed Protonix by his primary care at first which did not help  He then had US below and was referred to GI    [facility]  Liver: Intrahepatic biliary duct dilatation is present. No focal liver lesions visualized. Gallbladder: The gallbladder is distended and contains sludge. No pericholecystic fluid. No sonographic Pop sign. Common bile duct: Dilated to 10 mm. Pancreas: The body the pancreas is seen and there is pancreatic duct dilatation to 4 mm in the body the pancreas. The head and tail the pancreas are not adequately visualized by ultrasound. Right kidney: Normal.     IMPRESSION:     Common duct dilatation, intrahepatic biliary duct dilatation, and gallbladder distention likely represents biliary obstruction. Superimposed pancreatic duct dilatation is also present. This constellation of findings can be seen in the setting of a pancreatic head mass. The head of the pancreas is not adequately seen on today's exam.   I would recommend pancreatic mass protocol CT and gastroenterology consultation. 23 ERCP with stent; Dr Abdirahman Lopez:  Biliary stricture just proximal to ampulla. Could visualize this area post papillotomy. Sludge and cloudy bile extracted. Able to pass 12 mm balloon with resistance. Good drainage with stent. Pancreas- not able to cannulate. GB- ? Sludge vs small stones  Biggest concern would a small pancreatic head tumor          23 EUS; Dr Edin Mejia:   1.  Extensive pancreatic parenchymal abnormalities are consistent with chronic pancreatitis based on EUS criteria. It should be noted that the presence of chronic pancreatitis decreases the sensitivity of EUS for detection of pancreatic tumors. 2. 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 FNA was performed. 3. Pancreatic cyst. Morphologic features are most typical for pseudocyst. This is currently too small to warrant FNA. Soon after the EUS that he developed worsening abdominal pain and came to the ER. He denies prior abdominal surgery. He is not taking blood thinners. In ER on 2/19/23 wbc 15.8k, hgb 16.7, plt 306k  T bili 1.4, AST/ALT 12/42, alk [id number]  Lipase 1088  Gen Surgery was consulted after the below CT       2/18/23 CT abd/pelvis with IV contrast    Hx: upper abd pain s/p ERCP     Lower Chest: Scattered atelectasis in the lung bases. Liver: Normal.     Gallbladder/Billary: The gallbladder is inflamed. 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 A biliary stent is in good position. Pancreas: Normal.     Spleen: Normal.     Adrenal Glands: Normal.     Kidneys: Normal.     GI Tract: The stomach and duodenum are unremarkable. Normal appendix. Normal small bowel. Mesentery/Peritoneum: Hazy edema and inflammation in the upper central mesentery. No free intraperitoneal air. Vasculature: Normal.     Lymph Nodes: Normal.     Abdominal Wall: See musculoskeletal section. Bladder: Normal.     Reproductive: Normal.     Musculoskeletal: Normal.     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 2. Biliary stent in good position       Additional hx:  2/21/23 feels much better; still with some epigastric pain; ambulating in room  2/23/23 abd pain improved; AF; TPN/NPO;  IV Zosyn for cholecysto-colic fistula on CT  2/23/35 feels better; AF; WBC 6.3k;  On TPN for pancreatitis; repeat CT Monday 2/25/23 Feels OK; AF; ON TPN for pancreatitis and cholecysto-colic fistula suspected on initial; CT; Repeat CT Monday am ordered; OR Monday afternoon if needed            Past Medical History:   Diagnosis Date    GERD (gastroesophageal reflux disease)     medication    Sleep apnea     has a CPAP but does not use     Past Surgical History:   Procedure Laterality Date    COLONOSCOPY      ERCP N/A 2/1/2023    ERCP SPHINCTER/PAPILLOTOMY/BALLOON SWEEP/STENT PLACEMENT performed by MAX Scruggs MD at [facility] ENDOSCOPY    ERCP W/ PLASTIC STENT PLACEMENT  02/01/2023    SINUS SURGERY      x3    VASECTOMY       Current Facility-Administered Medications   Medication Dose Route Frequency    PN-Adult Premix 5/15 - Central   IntraVENous Continuous TPN    melatonin tablet 6 mg  6 mg Oral Nightly PRN    fat emulsion 20 % infusion 250 mL  250 mL IntraVENous Once per day on Mon Wed Fri    potassium chloride 20 mEq/50 mL IVPB (Central Line)  20 mEq IntraVENous PRN    Or    potassium chloride 10 mEq/100 mL IVPB (Peripheral Line)  10 mEq IntraVENous PRN    magnesium sulfate 2000 mg in 50 mL IVPB premix  2,000 mg IntraVENous PRN    sodium phosphate 10 mmol in sodium chloride 0.9 % 250 mL IVPB  10 mmol IntraVENous PRN    Or    sodium phosphate 15 mmol in sodium chloride 0.9 % 250 mL IVPB  15 mmol IntraVENous PRN    Or    sodium phosphate 20 mmol in sodium chloride 0.9 % 500 mL IVPB  20 mmol IntraVENous PRN    diphenhydrAMINE (BENADRYL) injection 25 mg  25 mg IntraVENous Nightly PRN    sodium chloride flush 0.9 % injection 5-40 mL  5-40 mL IntraVENous 2 times per day    sodium chloride flush 0.9 % injection 5-40 mL  5-40 mL IntraVENous PRN    0.9 % sodium chloride infusion  25 mL IntraVENous PRN    HYDROmorphone (DILAUDID) injection 1 mg  1 mg IntraVENous Q4H PRN    HYDROmorphone (DILAUDID) injection 0.5 mg  0.5 mg IntraVENous Q4H PRN    sodium chloride flush 0.9 % injection 5-40 mL  5-40 mL IntraVENous PRN    0.9 % sodium chloride infusion   IntraVENous PRN    ondansetron (ZOFRAN-ODT) disintegrating  tablet 4 mg  4 mg Oral Q8H PRN    Or    ondansetron (ZOFRAN) injection 4 mg  4 mg IntraVENous Q6H PRN    acetaminophen (TYLENOL) tablet 650 mg  650 mg Oral Q6H PRN    piperacillin-tazobactam (ZOSYN) 3,375 mg in sodium chloride 0.9 % 50 mL IVPB (mini-bag)  3,375 mg IntraVENous q8h    pantoprazole (PROTONIX) 40 mg in sodium chloride (PF) 0.9 % 10 mL injection  40 mg IntraVENous Daily     ALLERGIES:  Patient has no known allergies. Social History     Socioeconomic History    Marital status:    Tobacco Use    Smoking status: Every Day     Packs/day: 0.50     Years: 40.00     Pack years: 20.00     Types: Cigarettes    Smokeless tobacco: Never   Vaping Use    Vaping Use: Never used   Substance and Sexual Activity    Alcohol use: Not Currently    Drug use: Not Currently     Social History     Tobacco Use   Smoking Status Every Day    Packs/day: 0.50    Years: 40.00    Pack years: 20.00    Types: Cigarettes   Smokeless Tobacco Never     No family history on file. ROS: The patient has no difficulty with chest pain or shortness of breath. No fever or chills. Comprehensive review of systems was otherwise unremarkable except as noted above. Physical Exam:   /70   Pulse 62   Temp 97.3 °F (36.3 °C) (Oral)   Resp 17   Ht 6' (1.829 m)   Wt 180 lb 5.4 oz (81.8 kg)   SpO2 95%   BMI 24.46 kg/m²   Vitals:    02/24/23 1950 02/25/23 0018 02/25/23 0510 02/25/23 0754   BP: 125/78 93/65 103/68 101/70   Pulse: 59 56 54 62   Resp: 18 18 18 17   Temp: 97.5 °F (36.4 °C) 97.3 °F (36.3 °C) 97.6 °F (36.4 °C) 97.3 °F (36.3 °C)   TempSrc: Oral Oral Oral Oral   SpO2: 96% 93% 96% 95%   Weight:       Height:         No intake/output data recorded. 02/23 1901 - 02/25 0700  In: 2284.4 [I.V.:2284.4]  Out: 5843 [Urine:4575]    Constitutional: Alert, oriented, cooperative patient in no acute distress; appears stated age    Eyes:Sclera are clear.  EOMs intact  ENMT: no external lesions gross hearing normal; no obvious neck masses, no ear or lip lesions, nares normal  CV: RRR. Normal perfusion  Resp: No JVD. Breathing is  non-labored; no audible wheezing. GI: soft and non-distended, epigastric pain is improving       Musculoskeletal: unremarkable with normal function. No embolic signs or cyanosis. Neuro:  Oriented; moves all 4; no focal deficits  Psychiatric: normal affect and mood, no memory impairment    Recent vitals (if inpt):  Patient Vitals for the past 24 hrs:   BP Temp Temp src Pulse Resp SpO2   02/25/23 0754 101/70 97.3 °F (36.3 °C) Oral 62 17 95 %   02/25/23 0510 103/68 97.6 °F (36.4 °C) Oral 54 18 96 %   02/25/23 0018 93/65 97.3 °F (36.3 °C) Oral 56 18 93 %   02/24/23 1950 125/78 97.5 °F (36.4 °C) Oral 59 18 96 %   02/24/23 1623 106/77 97.7 °F (36.5 °C) Oral 53 16 97 %   02/24/23 1114 104/83 97.5 °F (36.4 °C) Oral 59 18 96 %       Amount and/or Complexity of Data Reviewed and Analyzed:  I reviewed and analyzed all of the unique labs and radiologic studies that are shown below as well as any that are in the HPI, and any that are in the expanded problem list below  *Each unique test, order, or document contributes to the combination of 2 or combination of 3 in Category 1 below. For this visit I also reviewed old records and prior notes.       Recent Labs     02/25/23  0650   WBC 5.7   HGB 14.1         K 4.3      CO2 24   BUN 14     Review of most recent CBC  Lab Results   Component Value Date    WBC 5.7 02/25/2023    HGB 14.1 02/25/2023    HCT 41.9 02/25/2023    MCV 90.9 02/25/2023     02/25/2023       Review of most recent BMP  Lab Results   Component Value Date/Time     02/25/2023 06:50 AM    K 4.3 02/25/2023 06:50 AM     02/25/2023 06:50 AM    CO2 24 02/25/2023 06:50 AM    BUN 14 02/25/2023 06:50 AM    CREATININE 0.70 02/25/2023 06:50 AM    GLUCOSE 137 02/25/2023 06:50 AM    CALCIUM 9.5 02/25/2023 06:50 AM        Review of most recent LFTs (and lipase if done)  Lab Results Component Value Date    ALT 19 02/21/2023    AST 14 (L) 02/21/2023    ALKPHOS 80 02/21/2023    BILITOT 1.1 02/21/2023     Lab Results   Component Value Date    LIPASE 130 02/21/2023       No results found for: INR, APTT, LCAD    Review of most recent HgbA1c  No results found for: LABA1C    Nutritional assessment screen for wound healing issues:  Lab Results   Component Value Date    LABALBU 2.6 (L) 02/21/2023       @lastcovr@    Xray Result (most recent):  No results found for this or any previous visit from the past 3650 days. CT Result (most recent):  CT ABDOMEN PELVIS W IV CONTRAST 02/18/2023    Addendum 2/18/2023 10:25 PM  ADDENDUM:    Case discussed with MD.    Axial image 29 appears to show fluid in the lumen of the gallbladder  communicating with fluid in the lumen of the colon. This is a subtle finding;  artifact cannot be completely excluded. Coronal images 23 and 24 show extraluminal fluid density abutting the superior  margin of the colon where it abuts the gallbladder. Dylan Miranda M.D.  2/18/2023 10:25:00 PM    Addendum 2/18/2023  9:07 PM  ADDENDUM:    Luna Hastings 89    Dylan Miranda M.D.  2/18/2023 9:07:00 PM    Narrative  EXAMINATION: CT SCAN OF THE ABDOMEN AND PELVIS WITH INTRAVENOUS CONTRAST    DATE OF EXAM: 2/18/2023 5:15 PM    HISTORY: upper abd pain s/p ERCP    COMPARISON: None. TECHNIQUE: CT examination of the abdomen and pelvis was performed following the  intravenous administration of 100 mL of Isovue-370. CT dose lowering techniques  were used, to include: automated exposure control, adjustment for patient size,  and/or use of iterative reconstruction. FINDINGS:    ABDOMEN/PELVIS:    Lower Chest: Scattered atelectasis in the lung bases. Liver: Normal.    Gallbladder/Billary: The gallbladder is inflamed. 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 A biliary  stent is in good position.     Pancreas: Normal.    Spleen: Normal.    Adrenal Glands: Normal.    Kidneys: Normal.    GI Tract: The stomach and duodenum are unremarkable. Normal appendix. Normal  small bowel. Mesentery/Peritoneum: Hazy edema and inflammation in the upper central  mesentery. No free intraperitoneal air. Vasculature: Normal.    Lymph Nodes: Normal.    Abdominal Wall: See musculoskeletal section. Bladder: Normal.    Reproductive: Normal.    Musculoskeletal: Normal.    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 2. Biliary stent in good position. Deniz Jamison M.D.  2/18/2023 8:58:00 PM    US Result (most recent):  No results found for this or any previous visit from the past 3650 days.       Admission date (for inpatients): 2/18/2023   * No surgery date entered *  * No surgery found *        ASSESSMENT/PLAN:  [unfilled]  Principal Problem:    Post-ERCP acute pancreatitis  Active Problems:    Biliary stricture    Abdominal pain    Hyponatremia    Tobacco use    Pancreatitis    Acute cholecystitis  Resolved Problems:    Sepsis [facility])     Patient Active Problem List    Diagnosis Date Noted    Post-ERCP acute pancreatitis 02/22/2023     Priority: Medium    Biliary stricture 02/18/2023     Priority: Medium    Abdominal pain 02/18/2023     Priority: Medium    Hyponatremia 02/18/2023     Priority: Medium    Tobacco use 02/18/2023     Priority: Medium    Pancreatitis 02/18/2023     Priority: Medium    Acute cholecystitis 02/18/2023     Priority: Medium     Added automatically from request for surgery [id number]            Number and Complexity of Problems addressed and   Risks of complications and/or morbidity of management            Acute Pancreatitis after EUS and FNA of pancreas  NPO  TPN via PICC   Morphine ordered for pain  FNA biopsy showed no evidence of malignancy  Etiology of distal CBD stricture most likely multifactorial recurrent pancreatitis (alcohol, gallstones/CBD stone)      Abnormal GB on CT  Possible cholecysto-colic fistula vs air in GB after ERCP and stent with surrounding pancreatic edema  It appears from the ERCP images that the cystic duct was patent at that time    IV Zosyn  Follow  Await resolution of acute pancreatitis and peripancreatic edema      Repeat CT ordered for Monday am to reassess  Surgery planned Monday afternoon if needed based on CT    Informed consent was obtained for laparoscopic possible open cholecystectomy, possible colon resection,  Procedure risks were discussed including bleeding, infection, hernia, SBO, fistula, bile duct injury, bile leak,   the need for open incision, the need for ostomy, blood clots, risk of anesthesia, etc.. All questions answered. He is in favor proceeding. Level of MDM (2/3 elements below)  Number and Complexity of Problems Addressed Amount and/or Complexity of Data to be Reviewed and Analyzed  *Each unique test, order, or document contributes to the combination of 2 or combination of 3 in Category 1 below. Risk of Complications and/or Morbidity or Mortality of pt Management     50794  73797 SF Minimal  ?1self-limited or minor problem Minimal or none Minimal risk of morbidity from additional diagnostic testing or Rx   49235  75777 Low Low  ? 2or more self-limited or minor problems;    or  ? 1stable chronic illness;    or  ?1acute, uncomplicated illness or injury   Limited  (Must meet the requirements of at least 1 of the 2 categories)  Category 1: Tests and documents   ? Any combination of 2 from the following:  ?Review of prior external note(s) from each unique source*;  ?review of the result(s) of each unique test*;   ?ordering of each unique test*    or   Category 2: Assessment requiring an independent historian(s)  (For the categories of independent interpretation of tests and discussion of management or test interpretation, see moderate or high) Low risk of morbidity from additional diagnostic testing or treatment     58754  33652 Mod Moderate  ? 1or more chronic illnesses with exacerbation, progression, or side effects of treatment;    or  ?2or more stable chronic illnesses;    or  ?1undiagnosed new problem with uncertain prognosis;    or  ?1acute illness with systemic symptoms;    or  ?1acute complicated injury   Moderate  (Must meet the requirements of at least 1 out of 3 categories)  Category 1: Tests, documents, or independent historian(s)  ? Any combination of 3 from the following:   ?Review of prior external note(s) from each unique source*;  ?Review of the result(s) of each unique test*;  ?Ordering of each unique test*;  ?Assessment requiring an independent historian(s)    or  Category 2: Independent interpretation of tests   ? Independent interpretation of a test performed by another physician/other qualified health care professional (not separately reported);     or  Category 3: Discussion of management or test interpretation  ? Discussion of management or test interpretation with external physician/other qualified health care professional/appropriate source (not separately reported)   Moderate risk of morbidity from additional diagnostic testing or treatment  Examples only:  ?Prescription drug management   ? Decision regarding minor surgery with identified patient or procedure risk factors  ? Decision regarding elective major surgery without identified patient or procedure risk factors   ? Diagnosis or treatment significantly limited by social determinants of health       59359  77730 High High  ? 1or more chronic illnesses with severe exacerbation, progression, or side effects of treatment;    or  ?1 acute or chronic illness or injury that poses a threat to life or bodily function   Extensive  (Must meet the requirements of at least 2 out of 3 categories)  Category 1: Tests, documents, or independent historian(s)  ? Any combination of 3 from the following:   ?Review of prior external note(s) from each unique source*;  ?Review of the result(s) of each unique test*;   ?Ordering of each unique test*;   ?Assessment requiring an independent historian(s)    or   Category 2: Independent interpretation of tests   ?Independent interpretation of a test performed by another physician/other qualified health care professional (not separately reported);     or  Category 3: Discussion of management or test interpretation  ?Discussion of management or test interpretation with external physician/other qualified health care professional/appropriate source (not separately reported)   High risk of morbidity from additional diagnostic testing or treatment  Examples only:  ?Drug therapy requiring intensive monitoring for toxicity  ?Decision regarding elective major surgery with identified patient or procedure risk factors  ?Decision regarding emergency major surgery  ?Decision regarding hospitalization  ?Decision not to resuscitate or to de-escalate care because of poor prognosis      Parenteral controlled substances             I have personally performed a face-to-face diagnostic evaluation and management  service on this patient.      I have independently seen the patient.   I have independently obtained the above history from the patient/family.    I have independently examined the patient with above findings.  I have independently reviewed data/labs for this patient and developed the above plan of care (MDM).      Signed: BRIDGET DOWNING MD  2/25/2023    8:27 AM

## 2023-02-25 NOTE — PROGRESS NOTES
Comprehensive Nutrition Assessment    Type and Reason for Visit: Reassess  Malnutrition Screening Tool: Malnutrition Screen  Have you recently lost weight without trying?: 2 to 13 pounds (1 point)  Have you been eating poorly because of a decreased appetite?: Yes (1 point)  Malnutrition Screening Tool Score: 2  TPN Management (Hospitalist)    Nutrition Recommendations/Plan:   Parenteral Nutrition:  Central parenteral nutrition  new bag to begin at 1800 today  Continue: Dex 15%, 5% AA 2 L (85ml/hr)   Continue 250 ml 20% lipids 3 x/week (MWF)  Lytes/L:   Sodium ( 60 meq NaCl), Potassium ( 30 meq KCl) Phosphorus ( 2.5 mmol KPO4), Calcium (4.5 meq), Magnesium 5 meq   Other additives:   MVI Monday Wednesday Friday due to [employer], MTE daily  Nutrition Related Medication Management:  Electrolyte Replacement Protocol PRN Potassium, Phosphorus, and Magnesium   Labs:   BMP daily  Mg MWF  Phos MWF    Triglyceride weekly on Tuesdays  POC Glucoses/SSI Not indicated     Malnutrition Assessment:  Malnutrition Status: At risk for malnutrition (Comment) (weight loss, variable po over last 2 months)    No kimberly wasting  Nutrition Assessment:  Nutrition History: Patient reports that prior to Feb 1st he has been losing weight and not eating well. He reports usual weight of 210 lbs and got down to about 183#s before the procedure 2/1/23. He says following the procedure we started to eat again very well with no issues and gained weight back to at least 190 #s. He reports he than had procedure on 2/16 following that he has been getting severe abdominal pain that is worse when eating/drinking. He reports able to eat breakfast Friday morning with no N/V but significant pain. Do You Have Any Cultural, Anabaptist, or Ethnic Food Preferences?: No   Nutrition Background:   Wound Type: None   PMH only significant for GERD.  Recent GI complaints followed by GI s/p ERCP with biliary stent on 2-1-23, EUS w/ FNA 2-16-23 who presented due to abdominal pain. Elevated lipase on admission. 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 Surgery consulted but no surgery for now awaiting resolution of pancreatitis. Nutrition Interval:  2/21: Double lumen PICC, PPN started without lipids. 2/22: advanced to TPN with lipids x 3/week. Remains TPN dependent, repeat CT planned Monday. Pt has been up for shower and am walk around the unit. RN reconnecting TPN at RD visit. Abdominal Status (last documented):   Last BM (including prior to admit): 02/23/23, GI Symptoms: Flatus, Cramping   Pertinent Medications: Protonix, Zosyn  Continuous: none  Electrolyte Replacement:  electrolyte protocol active  PRN: Zofran (no admin)  Pertinent Labs:   Lab Results   Component Value Date/Time     02/25/2023 06:50 AM    K 4.3 02/25/2023 06:50 AM     02/25/2023 06:50 AM    CO2 24 02/25/2023 06:50 AM    BUN 14 02/25/2023 06:50 AM    CREATININE 0.70 02/25/2023 06:50 AM    GLUCOSE 137 02/25/2023 06:50 AM    CALCIUM 9.5 02/25/2023 06:50 AM    PHOS 3.8 02/25/2023 06:50 AM    MG 2.2 02/25/2023 06:50 AM      Lab Results   Component Value Date/Time    TRIG 184 02/23/2023 04:50 AM    TRIG 100 02/22/2023 04:55 AM   Bili 1.1.  (2/21)  Remarkable for: remain relatively stable, Na w slight decrease (volume and total Na content decreased 2/22), K w slight (overall K content has been maintained), phos stable (decreased 2/24) and Mg stable. Glucose again w mild elevation. TG appropriate for proceeding w lipids 3 x weekly. Will decrease K content in TPN tonight.      Current Nutrition Therapies:  Diet NPO Exceptions are: Ice Chips  PN-Adult Premix 5/15 - Central  Current Parenteral Nutrition Orders:  Type and Formula: Premix Central (Dex 15%, 5% AA)   Lipids: 250ml, Three times weekly  Duration: Continuous  Rate/Volume: 2 L (85ml/hr)  Current PN Order Provides: RD resuming at goal at RD visit  Goal PN Orders Provides: 1634 kcal average/d (93% of needs), 100 grams of protein/d (100% of needs), 300 grams of CHO/d and 2147 ml average total volume/d    Current Intake:   Average Meal Intake: NPO Average Supplements Intake: NPO      Anthropometric Measures:  Height: 6' (182.9 cm)  Current Body Wt: 180 lb 5.4 oz (81.8 kg) (2/24), Weight source: Not Specified  BMI: 24.5, Normal Weight (BMI 22.0 to 24.9) age over 72     Ideal Body Weight (Kg) (Calculated): 81 kg (178 lbs), 109.4 %  Usual Body Wt: 210 lb (95.3 kg), Percent weight change: -7.3       BMI Category Normal Weight (BMI 22.0 to 24.9) age over 72  Estimated Daily Nutrient Needs:  Energy (kcal/day): 8750-8797 (20-25 kcal/kg) (Kcal/kg Weight used: 88.3 kg Current (2/19)  Protein (g/day):  (1-1.2 g/kg) Weight Used: (Current) 88.3 kg  Fluid (ml/day):   (1 ml/kcal)    Nutrition Diagnosis:   Inadequate oral intake related to altered GI function as evidenced by NPO or clear liquid status due to medical condition, NPO status  Nutrition Interventions:   Food and/or Nutrient Delivery: Modify Parenteral Nutrition     Coordination of Nutrition Care: Continue to monitor while inpatient       Goals:   Previous Goal Met: Goal(s) Achieved  Active Goal:  (Maintain PN for primary needs)       Nutrition Monitoring and Evaluation:      Food/Nutrient Intake Outcomes: Parenteral Nutrition Intake/Tolerance, Diet Advancement/Tolerance  Physical Signs/Symptoms Outcomes: Biochemical Data, GI Status, Fluid Status or Edema, Weight    Discharge Planning:     Too soon to determine    Ty Ronnie Omari [address], [address]

## 2023-02-26 ENCOUNTER — ANESTHESIA EVENT (OUTPATIENT)
Dept: SURGERY | Age: 58
DRG: 862 | End: 2023-02-26
Payer: COMMERCIAL

## 2023-02-26 LAB
ANION GAP SERPL CALC-SCNC: 2 MMOL/L (ref 2–11)
BASOPHILS # BLD: 0.1 K/UL (ref 0–0.2)
BASOPHILS NFR BLD: 1 % (ref 0–2)
BUN SERPL-MCNC: 18 MG/DL (ref 6–23)
CALCIUM SERPL-MCNC: 9.4 MG/DL (ref 8.3–10.4)
CHLORIDE SERPL-SCNC: 109 MMOL/L (ref 101–110)
CO2 SERPL-SCNC: 23 MMOL/L (ref 21–32)
CREAT SERPL-MCNC: 0.8 MG/DL (ref 0.8–1.5)
DIFFERENTIAL METHOD BLD: ABNORMAL
EOSINOPHIL # BLD: 0.3 K/UL (ref 0–0.8)
EOSINOPHIL NFR BLD: 6 % (ref 0.5–7.8)
ERYTHROCYTE [DISTWIDTH] IN BLOOD BY AUTOMATED COUNT: 12.2 % (ref 11.9–14.6)
GLUCOSE SERPL-MCNC: 118 MG/DL (ref 65–100)
HCT VFR BLD AUTO: 43.2 % (ref 41.1–50.3)
HGB BLD-MCNC: 14.6 G/DL (ref 13.6–17.2)
IMM GRANULOCYTES # BLD AUTO: 0 K/UL (ref 0–0.5)
IMM GRANULOCYTES NFR BLD AUTO: 1 % (ref 0–5)
LYMPHOCYTES # BLD: 2.6 K/UL (ref 0.5–4.6)
LYMPHOCYTES NFR BLD: 45 % (ref 13–44)
MAGNESIUM SERPL-MCNC: 2.1 MG/DL (ref 1.8–2.4)
MCH RBC QN AUTO: 30.7 PG (ref 26.1–32.9)
MCHC RBC AUTO-ENTMCNC: 33.8 G/DL (ref 31.4–35)
MCV RBC AUTO: 90.9 FL (ref 82–102)
MONOCYTES # BLD: 0.5 K/UL (ref 0.1–1.3)
MONOCYTES NFR BLD: 10 % (ref 4–12)
NEUTS SEG # BLD: 2.1 K/UL (ref 1.7–8.2)
NEUTS SEG NFR BLD: 37 % (ref 43–78)
NRBC # BLD: 0 K/UL (ref 0–0.2)
PHOSPHATE SERPL-MCNC: 4.1 MG/DL (ref 2.5–4.5)
PLATELET # BLD AUTO: 276 K/UL (ref 150–450)
PMV BLD AUTO: 10.1 FL (ref 9.4–12.3)
POTASSIUM SERPL-SCNC: 4.4 MMOL/L (ref 3.5–5.1)
RBC # BLD AUTO: 4.75 M/UL (ref 4.23–5.6)
SODIUM SERPL-SCNC: 134 MMOL/L (ref 133–143)
WBC # BLD AUTO: 5.6 K/UL (ref 4.3–11.1)

## 2023-02-26 PROCEDURE — 6360000002 HC RX W HCPCS: Performed by: NURSE PRACTITIONER

## 2023-02-26 PROCEDURE — 2580000003 HC RX 258: Performed by: SURGERY

## 2023-02-26 PROCEDURE — 6360000002 HC RX W HCPCS: Performed by: SURGERY

## 2023-02-26 PROCEDURE — 6360000002 HC RX W HCPCS: Performed by: INTERNAL MEDICINE

## 2023-02-26 PROCEDURE — C9113 INJ PANTOPRAZOLE SODIUM, VIA: HCPCS | Performed by: INTERNAL MEDICINE

## 2023-02-26 PROCEDURE — 2500000003 HC RX 250 WO HCPCS: Performed by: SURGERY

## 2023-02-26 PROCEDURE — 83735 ASSAY OF MAGNESIUM: CPT

## 2023-02-26 PROCEDURE — 99232 SBSQ HOSP IP/OBS MODERATE 35: CPT | Performed by: SURGERY

## 2023-02-26 PROCEDURE — 85025 COMPLETE CBC W/AUTO DIFF WBC: CPT

## 2023-02-26 PROCEDURE — 80048 BASIC METABOLIC PNL TOTAL CA: CPT

## 2023-02-26 PROCEDURE — A4216 STERILE WATER/SALINE, 10 ML: HCPCS | Performed by: INTERNAL MEDICINE

## 2023-02-26 PROCEDURE — 84100 ASSAY OF PHOSPHORUS: CPT

## 2023-02-26 PROCEDURE — 1100000003 HC PRIVATE W/ TELEMETRY

## 2023-02-26 PROCEDURE — 2580000003 HC RX 258: Performed by: INTERNAL MEDICINE

## 2023-02-26 PROCEDURE — 1100000000 HC RM PRIVATE

## 2023-02-26 RX ORDER — HALOPERIDOL 5 MG/ML
1 INJECTION INTRAMUSCULAR
Status: CANCELLED | OUTPATIENT
Start: 2023-02-26 | End: 2023-02-27

## 2023-02-26 RX ORDER — IPRATROPIUM BROMIDE AND ALBUTEROL SULFATE 2.5; .5 MG/3ML; MG/3ML
1 SOLUTION RESPIRATORY (INHALATION)
Status: CANCELLED | OUTPATIENT
Start: 2023-02-26 | End: 2023-02-27

## 2023-02-26 RX ORDER — PROCHLORPERAZINE EDISYLATE 5 MG/ML
5 INJECTION INTRAMUSCULAR; INTRAVENOUS
Status: CANCELLED | OUTPATIENT
Start: 2023-02-26 | End: 2023-02-27

## 2023-02-26 RX ORDER — HYDROMORPHONE HCL 110MG/55ML
0.5 PATIENT CONTROLLED ANALGESIA SYRINGE INTRAVENOUS EVERY 5 MIN PRN
Status: CANCELLED | OUTPATIENT
Start: 2023-02-26

## 2023-02-26 RX ORDER — OXYCODONE HYDROCHLORIDE 5 MG/1
5 TABLET ORAL
Status: CANCELLED | OUTPATIENT
Start: 2023-02-26 | End: 2023-02-27

## 2023-02-26 RX ADMIN — DIPHENHYDRAMINE HYDROCHLORIDE 25 MG: 50 INJECTION, SOLUTION INTRAMUSCULAR; INTRAVENOUS at 20:58

## 2023-02-26 RX ADMIN — HYDROMORPHONE HYDROCHLORIDE 0.5 MG: 1 INJECTION, SOLUTION INTRAMUSCULAR; INTRAVENOUS; SUBCUTANEOUS at 23:57

## 2023-02-26 RX ADMIN — SODIUM CHLORIDE, PRESERVATIVE FREE 10 ML: 5 INJECTION INTRAVENOUS at 08:03

## 2023-02-26 RX ADMIN — PANTOPRAZOLE SODIUM 40 MG: 40 INJECTION, POWDER, LYOPHILIZED, FOR SOLUTION INTRAVENOUS at 08:02

## 2023-02-26 RX ADMIN — PIPERACILLIN AND TAZOBACTAM 3375 MG: 3; .375 INJECTION, POWDER, LYOPHILIZED, FOR SOLUTION INTRAVENOUS at 03:56

## 2023-02-26 RX ADMIN — PIPERACILLIN AND TAZOBACTAM 3375 MG: 3; .375 INJECTION, POWDER, LYOPHILIZED, FOR SOLUTION INTRAVENOUS at 20:58

## 2023-02-26 RX ADMIN — PIPERACILLIN AND TAZOBACTAM 3375 MG: 3; .375 INJECTION, POWDER, LYOPHILIZED, FOR SOLUTION INTRAVENOUS at 11:34

## 2023-02-26 RX ADMIN — HYDROMORPHONE HYDROCHLORIDE 1 MG: 1 INJECTION, SOLUTION INTRAMUSCULAR; INTRAVENOUS; SUBCUTANEOUS at 04:23

## 2023-02-26 RX ADMIN — POTASSIUM PHOSPHATE, MONOBASIC POTASSIUM PHOSPHATE, DIBASIC: 224; 236 INJECTION, SOLUTION, CONCENTRATE INTRAVENOUS at 17:17

## 2023-02-26 RX ADMIN — HYDROMORPHONE HYDROCHLORIDE 1 MG: 1 INJECTION, SOLUTION INTRAMUSCULAR; INTRAVENOUS; SUBCUTANEOUS at 00:03

## 2023-02-26 RX ADMIN — SODIUM CHLORIDE, PRESERVATIVE FREE 10 ML: 5 INJECTION INTRAVENOUS at 20:58

## 2023-02-26 ASSESSMENT — PAIN SCALES - GENERAL
PAINLEVEL_OUTOF10: 6
PAINLEVEL_OUTOF10: 8
PAINLEVEL_OUTOF10: 8
PAINLEVEL_OUTOF10: 3

## 2023-02-26 ASSESSMENT — PAIN DESCRIPTION - LOCATION
LOCATION: ABDOMEN
LOCATION: ABDOMEN;BACK
LOCATION: ABDOMEN

## 2023-02-26 ASSESSMENT — PAIN DESCRIPTION - ORIENTATION
ORIENTATION: ANTERIOR
ORIENTATION: POSTERIOR

## 2023-02-26 ASSESSMENT — PAIN - FUNCTIONAL ASSESSMENT: PAIN_FUNCTIONAL_ASSESSMENT: ACTIVITIES ARE NOT PREVENTED

## 2023-02-26 ASSESSMENT — PAIN DESCRIPTION - DESCRIPTORS
DESCRIPTORS: ACHING
DESCRIPTORS: ACHING;PRESSURE
DESCRIPTORS: ACHING

## 2023-02-26 ASSESSMENT — PAIN DESCRIPTION - PAIN TYPE: TYPE: ACUTE PAIN

## 2023-02-26 ASSESSMENT — LIFESTYLE VARIABLES: SMOKING_STATUS: 1

## 2023-02-26 NOTE — PROGRESS NOTES
H&P/Consult Note/Progress Note/Office Note:   Garo Broderick MRN: [id number]  :1965  Age:57 y.o.    HPI: Garo Broderick is a 61 y.o. male who was admitted by the hospitalist on 23 after he presented with epigastric pain. He reported severe nausea beginning in 2023 with p.o. intake  This was associated with upper abdominal discomfort and dark urine  He was prescribed Protonix by his primary care at first which did not help  He then had US below and was referred to GI    23 RUQ [facility]  Liver: Intrahepatic biliary duct dilatation is present. No focal liver lesions visualized. Gallbladder: The gallbladder is distended and contains sludge. No pericholecystic fluid. No sonographic Pop sign. Common bile duct: Dilated to 10 mm. Pancreas: The body the pancreas is seen and there is pancreatic duct dilatation to 4 mm in the body the pancreas. The head and tail the pancreas are not adequately visualized by ultrasound. Right kidney: Normal.     IMPRESSION:     Common duct dilatation, intrahepatic biliary duct dilatation, and gallbladder distention likely represents biliary obstruction. Superimposed pancreatic duct dilatation is also present. This constellation of findings can be seen in the setting of a pancreatic head mass. The head of the pancreas is not adequately seen on today's exam.   I would recommend pancreatic mass protocol CT and gastroenterology consultation. 23 ERCP with stent; Dr Jerman Coleman:  Biliary stricture just proximal to ampulla. Could visualize this area post papillotomy. Sludge and cloudy bile extracted. Able to pass 12 mm balloon with resistance. Good drainage with stent. Pancreas- not able to cannulate. GB- ? Sludge vs small stones  Biggest concern would a small pancreatic head tumor          23 EUS; Dr Js Cedeño:   1.  Extensive pancreatic parenchymal abnormalities are consistent with chronic pancreatitis based on EUS criteria. It should be noted that the presence of chronic pancreatitis decreases the sensitivity of EUS for detection of pancreatic tumors. 2. 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 FNA was performed. 3. Pancreatic cyst. Morphologic features are most typical for pseudocyst. This is currently too small to warrant FNA. Soon after the EUS that he developed worsening abdominal pain and came to the ER. He denies prior abdominal surgery. He is not taking blood thinners. In ER on 2/19/23 wbc 15.8k, hgb 16.7, plt 306k  T bili 1.4, AST/ALT 12/42, alk [id number]  Lipase 1088  Gen Surgery was consulted after the below CT       2/18/23 CT abd/pelvis with IV contrast    Hx: upper abd pain s/p ERCP     Lower Chest: Scattered atelectasis in the lung bases. Liver: Normal.     Gallbladder/Billary: The gallbladder is inflamed. 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 A biliary stent is in good position. Pancreas: Normal.     Spleen: Normal.     Adrenal Glands: Normal.     Kidneys: Normal.     GI Tract: The stomach and duodenum are unremarkable. Normal appendix. Normal small bowel. Mesentery/Peritoneum: Hazy edema and inflammation in the upper central mesentery. No free intraperitoneal air. Vasculature: Normal.     Lymph Nodes: Normal.     Abdominal Wall: See musculoskeletal section. Bladder: Normal.     Reproductive: Normal.     Musculoskeletal: Normal.     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 2. Biliary stent in good position       Additional hx:  2/21/23 feels much better; still with some epigastric pain; ambulating in room  2/23/23 abd pain improved; AF; TPN/NPO;  IV Zosyn for cholecysto-colic fistula on CT  0/82/62 feels better; AF; WBC 6.3k;  On TPN for pancreatitis; repeat CT Monday 2/25/23 Feels OK; AF; ON TPN for pancreatitis and cholecysto-colic fistula suspected on initial; CT; Repeat CT Monday am ordered; OR Monday afternoon if needed  2/26/23 some mild LUQ pain this am;  AF/VSS; WBC normal; repeat CT in am; possible surgery Monday afternoon            Past Medical History:   Diagnosis Date    GERD (gastroesophageal reflux disease)     medication    Sleep apnea     has a CPAP but does not use     Past Surgical History:   Procedure Laterality Date    COLONOSCOPY      ERCP N/A 2/1/2023    ERCP SPHINCTER/PAPILLOTOMY/BALLOON SWEEP/STENT PLACEMENT performed by Sho Wang MD at [facility] ENDOSCOPY    ERCP W/ PLASTIC STENT PLACEMENT  02/01/2023    SINUS SURGERY      x3    VASECTOMY       Current Facility-Administered Medications   Medication Dose Route Frequency    PN-Adult Premix 5/15 - Central   IntraVENous Continuous TPN    PN-Adult Premix 5/15 - Central   IntraVENous Continuous TPN    melatonin tablet 6 mg  6 mg Oral Nightly PRN    fat emulsion 20 % infusion 250 mL  250 mL IntraVENous Once per day on Mon Wed Fri    potassium chloride 20 mEq/50 mL IVPB (Central Line)  20 mEq IntraVENous PRN    Or    potassium chloride 10 mEq/100 mL IVPB (Peripheral Line)  10 mEq IntraVENous PRN    magnesium sulfate 2000 mg in 50 mL IVPB premix  2,000 mg IntraVENous PRN    sodium phosphate 10 mmol in sodium chloride 0.9 % 250 mL IVPB  10 mmol IntraVENous PRN    Or    sodium phosphate 15 mmol in sodium chloride 0.9 % 250 mL IVPB  15 mmol IntraVENous PRN    Or    sodium phosphate 20 mmol in sodium chloride 0.9 % 500 mL IVPB  20 mmol IntraVENous PRN    diphenhydrAMINE (BENADRYL) injection 25 mg  25 mg IntraVENous Nightly PRN    sodium chloride flush 0.9 % injection 5-40 mL  5-40 mL IntraVENous 2 times per day    sodium chloride flush 0.9 % injection 5-40 mL  5-40 mL IntraVENous PRN    0.9 % sodium chloride infusion  25 mL IntraVENous PRN    HYDROmorphone (DILAUDID) injection 1 mg  1 mg IntraVENous Q4H PRN    HYDROmorphone (DILAUDID) injection 0.5 mg  0.5 mg IntraVENous Q4H PRN    sodium chloride flush 0.9 % injection 5-40 mL  5-40 mL IntraVENous PRN    0.9 % sodium chloride infusion   IntraVENous PRN    ondansetron (ZOFRAN-ODT) disintegrating tablet 4 mg  4 mg Oral Q8H PRN    Or    ondansetron (ZOFRAN) injection 4 mg  4 mg IntraVENous Q6H PRN    acetaminophen (TYLENOL) tablet 650 mg  650 mg Oral Q6H PRN    piperacillin-tazobactam (ZOSYN) 3,375 mg in sodium chloride 0.9 % 50 mL IVPB (mini-bag)  3,375 mg IntraVENous q8h    pantoprazole (PROTONIX) 40 mg in sodium chloride (PF) 0.9 % 10 mL injection  40 mg IntraVENous Daily     ALLERGIES:  Patient has no known allergies. Social History     Socioeconomic History    Marital status:    Tobacco Use    Smoking status: Every Day     Packs/day: 0.50     Years: 40.00     Pack years: 20.00     Types: Cigarettes    Smokeless tobacco: Never   Vaping Use    Vaping Use: Never used   Substance and Sexual Activity    Alcohol use: Not Currently    Drug use: Not Currently     Social History     Tobacco Use   Smoking Status Every Day    Packs/day: 0.50    Years: 40.00    Pack years: 20.00    Types: Cigarettes   Smokeless Tobacco Never     No family history on file. ROS: The patient has no difficulty with chest pain or shortness of breath. No fever or chills. Comprehensive review of systems was otherwise unremarkable except as noted above.     Physical Exam:   BP 93/64   Pulse 62   Temp 97.3 °F (36.3 °C) (Oral)   Resp 17   Ht 6' (1.829 m)   Wt 180 lb 5.4 oz (81.8 kg)   SpO2 95%   BMI 24.46 kg/m²   Vitals:    02/25/23 2011 02/25/23 2344 02/26/23 0345 02/26/23 0729   BP: 101/73 100/64 103/70 93/64   Pulse: 58 64 52 62   Resp: 18 18 18 17   Temp: 98.4 °F (36.9 °C) 97.3 °F (36.3 °C) 97.4 °F (36.3 °C) 97.3 °F (36.3 °C)   TempSrc: Oral Oral Oral Oral   SpO2: 96% 97%  95%   Weight:       Height:         02/26 0701 - 02/26 1900  In: 50 [P.O.:50]  Out: - 02/24 1901 - 02/26 0700  In: 2394 [I.V.:2394]  Out: 1173 [Urine:8085]    Constitutional: Alert, oriented, cooperative patient in no acute distress; appears stated age    Eyes:Sclera are clear. EOMs intact  ENMT: no external lesions gross hearing normal; no obvious neck masses, no ear or lip lesions, nares normal  CV: RRR. Normal perfusion  Resp: No JVD. Breathing is  non-labored; no audible wheezing. GI: soft and non-distended, epigastric pain is improving       Musculoskeletal: unremarkable with normal function. No embolic signs or cyanosis. Neuro:  Oriented; moves all 4; no focal deficits  Psychiatric: normal affect and mood, no memory impairment    Recent vitals (if inpt):  Patient Vitals for the past 24 hrs:   BP Temp Temp src Pulse Resp SpO2   02/26/23 0729 93/64 97.3 °F (36.3 °C) Oral 62 17 95 %   02/26/23 0345 103/70 97.4 °F (36.3 °C) Oral 52 18 --   02/25/23 2344 100/64 97.3 °F (36.3 °C) Oral 64 18 97 %   02/25/23 2011 101/73 98.4 °F (36.9 °C) Oral 58 18 96 %   02/25/23 1458 98/71 98.2 °F (36.8 °C) Oral 61 18 96 %   02/25/23 1105 96/72 97.3 °F (36.3 °C) Oral 63 18 96 %       Amount and/or Complexity of Data Reviewed and Analyzed:  I reviewed and analyzed all of the unique labs and radiologic studies that are shown below as well as any that are in the HPI, and any that are in the expanded problem list below  *Each unique test, order, or document contributes to the combination of 2 or combination of 3 in Category 1 below. For this visit I also reviewed old records and prior notes.       Recent Labs     02/26/23  0554   WBC 5.6   HGB 14.6         K 4.4      CO2 23   BUN 18     Review of most recent CBC  Lab Results   Component Value Date    WBC 5.6 02/26/2023    HGB 14.6 02/26/2023    HCT 43.2 02/26/2023    MCV 90.9 02/26/2023     02/26/2023       Review of most recent BMP  Lab Results   Component Value Date/Time     02/26/2023 05:54 AM    K 4.4 02/26/2023 05:54 AM     02/26/2023 05:54 AM    CO2 23 02/26/2023 05:54 AM    BUN 18 02/26/2023 05:54 AM CREATININE 0.80 02/26/2023 05:54 AM    GLUCOSE 118 02/26/2023 05:54 AM    CALCIUM 9.4 02/26/2023 05:54 AM        Review of most recent LFTs (and lipase if done)  Lab Results   Component Value Date    ALT 19 02/21/2023    AST 14 (L) 02/21/2023    ALKPHOS 80 02/21/2023    BILITOT 1.1 02/21/2023     Lab Results   Component Value Date    LIPASE 130 02/21/2023       No results found for: INR, APTT, LCAD    Review of most recent HgbA1c  No results found for: LABA1C    Nutritional assessment screen for wound healing issues:  Lab Results   Component Value Date    LABALBU 2.6 (L) 02/21/2023       @lastcovr@    Xray Result (most recent):  No results found for this or any previous visit from the past 3650 days. CT Result (most recent):  CT ABDOMEN PELVIS W IV CONTRAST 02/18/2023    Addendum 2/18/2023 10:25 PM  ADDENDUM:    Case discussed with MD.    Axial image 29 appears to show fluid in the lumen of the gallbladder  communicating with fluid in the lumen of the colon. This is a subtle finding;  artifact cannot be completely excluded. Coronal images 23 and 24 show extraluminal fluid density abutting the superior  margin of the colon where it abuts the gallbladder. Lori Galicia M.D.  2/18/2023 10:25:00 PM    Addendum 2/18/2023  9:07 PM  ADDENDUM:    Rose Mirza 89    Lori Galicia M.D.  2/18/2023 9:07:00 PM    Narrative  EXAMINATION: CT SCAN OF THE ABDOMEN AND PELVIS WITH INTRAVENOUS CONTRAST    DATE OF EXAM: 2/18/2023 5:15 PM    HISTORY: upper abd pain s/p ERCP    COMPARISON: None. TECHNIQUE: CT examination of the abdomen and pelvis was performed following the  intravenous administration of 100 mL of Isovue-370. CT dose lowering techniques  were used, to include: automated exposure control, adjustment for patient size,  and/or use of iterative reconstruction. FINDINGS:    ABDOMEN/PELVIS:    Lower Chest: Scattered atelectasis in the lung bases. Liver: Normal.    Gallbladder/Billary: The gallbladder is inflamed.  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 A biliary  stent is in good position. Pancreas: Normal.    Spleen: Normal.    Adrenal Glands: Normal.    Kidneys: Normal.    GI Tract: The stomach and duodenum are unremarkable. Normal appendix. Normal  small bowel. Mesentery/Peritoneum: Hazy edema and inflammation in the upper central  mesentery. No free intraperitoneal air. Vasculature: Normal.    Lymph Nodes: Normal.    Abdominal Wall: See musculoskeletal section. Bladder: Normal.    Reproductive: Normal.    Musculoskeletal: Normal.    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 2. Biliary stent in good position. Jarocho Hilton M.D.  2/18/2023 8:58:00 PM    US Result (most recent):  No results found for this or any previous visit from the past 3650 days.       Admission date (for inpatients): 2/18/2023   * No surgery date entered *  * No surgery found *        ASSESSMENT/PLAN:  [unfilled]  Principal Problem:    Post-ERCP acute pancreatitis  Active Problems:    Biliary stricture    Abdominal pain    Hyponatremia    Tobacco use    Pancreatitis    Acute cholecystitis  Resolved Problems:    Sepsis [facility])     Patient Active Problem List    Diagnosis Date Noted    Post-ERCP acute pancreatitis 02/22/2023     Priority: Medium    Biliary stricture 02/18/2023     Priority: Medium    Abdominal pain 02/18/2023     Priority: Medium    Hyponatremia 02/18/2023     Priority: Medium    Tobacco use 02/18/2023     Priority: Medium    Pancreatitis 02/18/2023     Priority: Medium    Acute cholecystitis 02/18/2023     Priority: Medium     Added automatically from request for surgery [id number]            Number and Complexity of Problems addressed and   Risks of complications and/or morbidity of management            Acute Pancreatitis after EUS and FNA of pancreas  NPO  TPN via PICC   Morphine ordered for pain  FNA biopsy showed no evidence of malignancy  Etiology of distal CBD stricture most likely multifactorial recurrent pancreatitis (alcohol, gallstones/CBD stone)      Abnormal GB on CT  Possible cholecysto-colic fistula vs air in GB after ERCP and stent with surrounding pancreatic edema  It appears from the ERCP images that the cystic duct was patent at that time    IV Zosyn  Follow  Await resolution of acute pancreatitis and peripancreatic edema      Repeat CT ordered for Monday am to reassess  Surgery planned Monday afternoon if needed based on CT    Informed consent was obtained for laparoscopic possible open cholecystectomy, possible colon resection,  Procedure risks were discussed including bleeding, infection, hernia, SBO, fistula, bile duct injury, bile leak,   the need for open incision, the need for ostomy, blood clots, risk of anesthesia, etc.. All questions answered. He is in favor proceeding. Level of MDM (2/3 elements below)  Number and Complexity of Problems Addressed Amount and/or Complexity of Data to be Reviewed and Analyzed  *Each unique test, order, or document contributes to the combination of 2 or combination of 3 in Category 1 below. Risk of Complications and/or Morbidity or Mortality of pt Management     14710  80730 SF Minimal  ?1self-limited or minor problem Minimal or none Minimal risk of morbidity from additional diagnostic testing or Rx   36541  63884 Low Low  ? 2or more self-limited or minor problems;    or  ? 1stable chronic illness;    or  ?1acute, uncomplicated illness or injury   Limited  (Must meet the requirements of at least 1 of the 2 categories)  Category 1: Tests and documents   ? Any combination of 2 from the following:  ?Review of prior external note(s) from each unique source*;  ?review of the result(s) of each unique test*;   ?ordering of each unique test*    or   Category 2: Assessment requiring an independent historian(s)  (For the categories of independent interpretation of tests and discussion of management or test interpretation, see moderate or high) Low risk of morbidity from additional diagnostic testing or treatment     31029  99676 Mod Moderate  ?1or more chronic illnesses with exacerbation, progression, or side effects of treatment;    or  ?2or more stable chronic illnesses;    or  ?1undiagnosed new problem with uncertain prognosis;    or  ?1acute illness with systemic symptoms;    or  ?1acute complicated injury   Moderate  (Must meet the requirements of at least 1 out of 3 categories)  Category 1: Tests, documents, or independent historian(s)  ?Any combination of 3 from the following:   ?Review of prior external note(s) from each unique source*;  ?Review of the result(s) of each unique test*;  ?Ordering of each unique test*;  ?Assessment requiring an independent historian(s)    or  Category 2: Independent interpretation of tests   ?Independent interpretation of a test performed by another physician/other qualified health care professional (not separately reported);     or  Category 3: Discussion of management or test interpretation  ?Discussion of management or test interpretation with external physician/other qualified health care professional/appropriate source (not separately reported)   Moderate risk of morbidity from additional diagnostic testing or treatment  Examples only:  ?Prescription drug management   ?Decision regarding minor surgery with identified patient or procedure risk factors  ?Decision regarding elective major surgery without identified patient or procedure risk factors   ?Diagnosis or treatment significantly limited by social determinants of health       57031  17928 High High  ?1or more chronic illnesses with severe exacerbation, progression, or side effects of treatment;    or  ?1 acute or chronic illness or injury that poses a threat to life or bodily function   Extensive  (Must meet the requirements of at least 2 out of 3 categories)  Category 1: Tests,  documents, or independent historian(s)  ? Any combination of 3 from the following:   ?Review of prior external note(s) from each unique source*;  ?Review of the result(s) of each unique test*;   ?Ordering of each unique test*;   ?Assessment requiring an independent historian(s)    or   Category 2: Independent interpretation of tests   ? Independent interpretation of a test performed by another physician/other qualified health care professional (not separately reported);     or  Category 3: Discussion of management or test interpretation  ? Discussion of management or test interpretation with external physician/other qualified health care professional/appropriate source (not separately reported)   High risk of morbidity from additional diagnostic testing or treatment  Examples only:  ?Drug therapy requiring intensive monitoring for toxicity  ? Decision regarding elective major surgery with identified patient or procedure risk factors  ? Decision regarding emergency major surgery  ? Decision regarding hospitalization  ? Decision not to resuscitate or to de-escalate care because of poor prognosis      Parenteral controlled substances             I have personally performed a face-to-face diagnostic evaluation and management  service on this patient. I have independently seen the patient. I have independently obtained the above history from the patient/family. I have independently examined the patient with above findings. I have independently reviewed data/labs for this patient and developed the above plan of care (MDM).       Signed: Morgan Ham MD  2/26/2023    10:23 AM

## 2023-02-26 NOTE — PROGRESS NOTES
Comprehensive Nutrition Assessment    Type and Reason for Visit: Reassess  Malnutrition Screening Tool: Malnutrition Screen  Have you recently lost weight without trying?: 2 to 13 pounds (1 point)  Have you been eating poorly because of a decreased appetite?: Yes (1 point)  Malnutrition Screening Tool Score: 2  TPN Management (Hospitalist now signed off orders per Surgery)    Nutrition Recommendations/Plan:   Parenteral Nutrition:  Central parenteral nutrition  new bag to begin at 1800 today  Continue: Dex 15%, 5% AA 2 L (85ml/hr)   Continue 250 ml 20% lipids 3 x/week (MWF)  Lytes/L:   Sodium ( 70 meq NaCl), Potassium ( 20 meq KCl) Phosphorus ( 2.5 mmol KPO4), Calcium (4.5 meq), Magnesium 5 meq   Other additives:   MVI Monday Wednesday Friday due to [employer], MTE daily  Nutrition Related Medication Management:  Electrolyte Replacement Protocol PRN Potassium, Phosphorus, and Magnesium   Labs:   BMP daily  Mg MWF  Phos MWF    Triglyceride weekly on Tuesdays  POC Glucoses/SSI Not indicated     Malnutrition Assessment:  Malnutrition Status: At risk for malnutrition (Comment) (weight loss, variable po over last 2 months)    No kimberly wasting  Nutrition Assessment:  Nutrition History: Patient reports that prior to Feb 1st he has been losing weight and not eating well. He reports usual weight of 210 lbs and got down to about 183#s before the procedure 2/1/23. He says following the procedure we started to eat again very well with no issues and gained weight back to at least 190 #s. He reports he than had procedure on 2/16 following that he has been getting severe abdominal pain that is worse when eating/drinking. He reports able to eat breakfast Friday morning with no N/V but significant pain. Do You Have Any Cultural, Lutheran, or Ethnic Food Preferences?: No   Nutrition Background:   Wound Type: None   PMH only significant for GERD.  Recent GI complaints followed by GI s/p ERCP with biliary stent on 2-1-23, EUS w/ FNA 2-16-23 who presented due to abdominal pain. Elevated lipase on admission. 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 Surgery consulted but no surgery for now awaiting resolution of pancreatitis. Nutrition Interval:  2/21: Double lumen PICC, PPN started without lipids. 2/22: advanced to TPN with lipids x 3/week. Remains TPN dependent, repeat CT planned Monday. Pt up in room, TPN off at present as he is just finished showering previously infusing at goal.  Discussed with Sj Dudley RN. Abdominal Status (last documented):   Last BM (including prior to admit): 02/24/23, GI Symptoms: Flatus   Pertinent Medications: Protonix, Zosyn  Continuous: none  Electrolyte Replacement:  electrolyte protocol active  PRN: Zofran (no admin)  Pertinent Labs:   Lab Results   Component Value Date/Time     02/26/2023 05:54 AM    K 4.4 02/26/2023 05:54 AM     02/26/2023 05:54 AM    CO2 23 02/26/2023 05:54 AM    BUN 18 02/26/2023 05:54 AM    CREATININE 0.80 02/26/2023 05:54 AM    GLUCOSE 118 02/26/2023 05:54 AM    CALCIUM 9.4 02/26/2023 05:54 AM    PHOS 4.1 02/26/2023 05:54 AM    MG 2.1 02/26/2023 05:54 AM      Lab Results   Component Value Date/Time    TRIG 184 02/23/2023 04:50 AM    TRIG 100 02/22/2023 04:55 AM   Bili 1.1.  (2/21)  Remarkable for: remain relatively stable, Na w consistent slow trend down s/p volume and content change, remains on zosyn, now low end normal, K w slight increase (decreased in PN 2/25), phos w single upward trend remains WNL (decreased 2/24) and Mg remains stable. Glucose again w mild elevation. TG appropriate for proceeding w lipids 3 x weekly. Will again decrease K content and increase Na content in TPN tonight.      Current Nutrition Therapies:  Diet NPO Exceptions are: Ice Chips  PN-Adult Premix 5/15 - Central  Current Parenteral Nutrition Orders:  Type and Formula: Premix Central (Dex 15%, 5% AA)   Lipids: 250ml, Three times weekly  Duration: Continuous  Rate/Volume: 2 L (85ml/hr)  Current PN Order Provides: 1634 kcal average/d (93% of needs), 100 grams of protein/d (100% of needs), 300 grams of CHO/d and 2147 ml average total volume/d    Current Intake:   Average Meal Intake: NPO Average Supplements Intake: NPO      Anthropometric Measures:  Height: 6' (182.9 cm)  Current Body Wt: 180 lb 5.4 oz (81.8 kg) (2/24), Weight source: Not Specified  BMI: 24.5, Normal Weight (BMI 22.0 to 24.9) age over 72     Ideal Body Weight (Kg) (Calculated): 81 kg (178 lbs), 109.4 %  Usual Body Wt: 210 lb (95.3 kg), Percent weight change: -7.3       BMI Category Normal Weight (BMI 22.0 to 24.9) age over 72  Estimated Daily Nutrient Needs:  Energy (kcal/day): 6404-2463 (20-25 kcal/kg) (Kcal/kg Weight used: 88.3 kg Current (2/19)  Protein (g/day):  (1-1.2 g/kg) Weight Used: (Current) 88.3 kg  Fluid (ml/day):   (1 ml/kcal)    Nutrition Diagnosis:   Inadequate oral intake related to altered GI function as evidenced by NPO or clear liquid status due to medical condition, NPO status  Nutrition Interventions:   Food and/or Nutrient Delivery: Modify Parenteral Nutrition     Coordination of Nutrition Care: Continue to monitor while inpatient       Goals:   Previous Goal Met: Goal(s) Achieved  Active Goal:  (Maintain PN for primary needs)       Nutrition Monitoring and Evaluation:      Food/Nutrient Intake Outcomes: Parenteral Nutrition Intake/Tolerance, Diet Advancement/Tolerance  Physical Signs/Symptoms Outcomes: Biochemical Data, GI Status, Fluid Status or Edema, Weight    Discharge Planning:     Too soon to determine    Ty Ronnie Omari [address], [address]

## 2023-02-26 NOTE — ANESTHESIA PRE PROCEDURE
Department of Anesthesiology  Preprocedure Note       Name:  Pearl Canavan. Age:  61 y.o.  :  1965                                          MRN:  [id number]         Date:  2023      Surgeon: Erasmo Hooks):  Gilbert Rushing MD    Procedure: Procedure(s):  CHOLECYSTECTOMY LAPAROSCOPIC    Medications prior to admission:   Prior to Admission medications    Medication Sig Start Date End Date Taking? Authorizing Provider   oxyCODONE 5 MG capsule Take 5 mg by mouth every 4 hours as needed for Pain. Historical Provider, MD   acetaminophen (TYLENOL) 500 MG tablet Take 500 mg by mouth every 6 hours as needed for Pain    Historical Provider, MD   pantoprazole (PROTONIX) 20 MG tablet Take 20 mg by mouth 2 times daily  Patient not taking: Reported on 2023   Historical Provider, MD   sucralfate (CARAFATE) 1 GM tablet Take 1 g by mouth 3 times daily  Patient not taking: Reported on 2023   Historical Provider, MD   ondansetron (ZOFRAN-ODT) 4 MG disintegrating tablet Take 4 mg by mouth 3 times daily as needed  Patient not taking: Reported on 2023   Historical Provider, MD   hydrocortisone (ANUSOL-HC) 25 MG suppository Place 25 mg rectally 2 times daily as needed 10/14/22   Historical Provider, MD       Current medications:    No current facility-administered medications for this visit. No current outpatient medications on file.      Facility-Administered Medications Ordered in Other Visits   Medication Dose Route Frequency Provider Last Rate Last Admin    PN-Adult Premix 5/15 - Central   IntraVENous Continuous TPN Gilbert Rushing MD        PN-Adult Premix 5/15 - Central   IntraVENous Continuous TPN Gilbert Rushing MD 85 mL/hr at 23 1733 New Bag at 23 1733    melatonin tablet 6 mg  6 mg Oral Nightly PRN Indra Hernandez MD        fat emulsion 20 % infusion 250 mL  250 mL IntraVENous Once per day on  Jese Valle MD   Stopped at 02/25/23 1200    potassium chloride 20 mEq/50 mL IVPB (Central Line)  20 mEq IntraVENous PRN Radha Guallpa MD        Or    potassium chloride 10 mEq/100 mL IVPB (Peripheral Line)  10 mEq IntraVENous PRN Radha Guallpa MD        magnesium sulfate 2000 mg in 50 mL IVPB premix  2,000 mg IntraVENous PRN Radha Guallpa MD        sodium phosphate 10 mmol in sodium chloride 0.9 % 250 mL IVPB  10 mmol IntraVENous PRN Radha Guallpa MD        Or    sodium phosphate 15 mmol in sodium chloride 0.9 % 250 mL IVPB  15 mmol IntraVENous PRN Radha Guallpa MD        Or    sodium phosphate 20 mmol in sodium chloride 0.9 % 500 mL IVPB  20 mmol IntraVENous PRN Radha Guallpa MD        diphenhydrAMINE (BENADRYL) injection 25 mg  25 mg IntraVENous Nightly PRN Huong Steen MD   25 mg at 02/25/23 2035    sodium chloride flush 0.9 % injection 5-40 mL  5-40 mL IntraVENous 2 times per day Huong Steen MD   10 mL at 02/26/23 0803    sodium chloride flush 0.9 % injection 5-40 mL  5-40 mL IntraVENous PRN Huong Steen MD        0.9 % sodium chloride infusion  25 mL IntraVENous PRN Huong Steen MD        HYDROmorphone (DILAUDID) injection 1 mg  1 mg IntraVENous Q4H PRN IFRAH Wray - CNP   1 mg at 02/26/23 0423    HYDROmorphone (DILAUDID) injection 0.5 mg  0.5 mg IntraVENous Q4H PRN IFRAH Wray - CNP   0.5 mg at 02/22/23 3892    sodium chloride flush 0.9 % injection 5-40 mL  5-40 mL IntraVENous PRN Yolanda Disla MD        0.9 % sodium chloride infusion   IntraVENous PRN Yolanda Disla MD        ondansetron (ZOFRAN-ODT) disintegrating tablet 4 mg  4 mg Oral Q8H PRN Yolanda Disla MD        Or    ondansetron (ZOFRAN) injection 4 mg  4 mg IntraVENous Q6H PRN Yolanda Disla MD        acetaminophen (TYLENOL) tablet 650 mg  650 mg Oral Q6H PRN Yolanda Disla MD        piperacillin-tazobactam (ZOSYN) 3,375 mg in sodium chloride 0.9 % 50 mL IVPB (mini-bag)  3,375 mg IntraVENous q8h Madhuri Serrano Melissa Griffin MD 12.5 mL/hr at 02/26/23 1134 3,375 mg at 02/26/23 1134    pantoprazole (PROTONIX) 40 mg in sodium chloride (PF) 0.9 % 10 mL injection  40 mg IntraVENous Daily Sanjeev Pepe MD   40 mg at 02/26/23 4153       Allergies:  No Known Allergies    Problem List:    Patient Active Problem List   Diagnosis Code    Biliary stricture K83.1    Abdominal pain R10.9    Hyponatremia E87.1    Tobacco use Z72.0    Pancreatitis K85.90    Post-ERCP acute pancreatitis K91.89, K85.90    Acute cholecystitis K81.0       Past Medical History:        Diagnosis Date    GERD (gastroesophageal reflux disease)     medication    Sleep apnea     has a CPAP but does not use       Past Surgical History:        Procedure Laterality Date    COLONOSCOPY      ERCP N/A 2/1/2023    ERCP SPHINCTER/PAPILLOTOMY/BALLOON SWEEP/STENT PLACEMENT performed by Gladis [facility] at [facility] ENDOSCOPY    ERCP W/ PLASTIC STENT PLACEMENT  02/01/2023    SINUS SURGERY      x3    VASECTOMY         Social History:    Social History     Tobacco Use    Smoking status: Every Day     Packs/day: 0.50     Years: 40.00     Pack years: 20.00     Types: Cigarettes    Smokeless tobacco: Never   Substance Use Topics    Alcohol use: Not Currently                                Ready to quit: Not Answered  Counseling given: Not Answered      Vital Signs (Current): There were no vitals filed for this visit.                                            BP Readings from Last 3 Encounters:   02/26/23 94/60   02/01/23 120/76       NPO Status:                                                                                 BMI:   Wt Readings from Last 3 Encounters:   02/24/23 180 lb 5.4 oz (81.8 kg)   02/01/23 188 lb (85.3 kg)     There is no height or weight on file to calculate BMI.    CBC:   Lab Results   Component Value Date/Time    WBC 5.6 02/26/2023 05:54 AM    RBC 4.75 02/26/2023 05:54 AM    HGB 14.6 02/26/2023 05:54 AM    HCT 43.2 02/26/2023 05:54 AM    MCV 90.9 02/26/2023 05:54 AM    RDW 12.2 02/26/2023 05:54 AM     02/26/2023 05:54 AM       CMP:   Lab Results   Component Value Date/Time     02/26/2023 05:54 AM    K 4.4 02/26/2023 05:54 AM     02/26/2023 05:54 AM    CO2 23 02/26/2023 05:54 AM    BUN 18 02/26/2023 05:54 AM    CREATININE 0.80 02/26/2023 05:54 AM    LABGLOM >60 02/26/2023 05:54 AM    GLUCOSE 118 02/26/2023 05:54 AM    PROT 6.6 02/21/2023 02:56 PM    CALCIUM 9.4 02/26/2023 05:54 AM    BILITOT 1.1 02/21/2023 02:56 PM    ALKPHOS 80 02/21/2023 02:56 PM    AST 14 02/21/2023 02:56 PM    ALT 19 02/21/2023 02:56 PM       POC Tests: No results for input(s): POCGLU, POCNA, POCK, POCCL, POCBUN, POCHEMO, POCHCT in the last 72 hours. Coags: No results found for: PROTIME, INR, APTT    HCG (If Applicable): No results found for: PREGTESTUR, PREGSERUM, HCG, HCGQUANT     ABGs: No results found for: PHART, PO2ART, AUS3WEZ, XZV1VXY, BEART, V2CFADTZ     Type & Screen (If Applicable):  No results found for: LABABO, LABRH    Drug/Infectious Status (If Applicable):  No results found for: HIV, HEPCAB    COVID-19 Screening (If Applicable): No results found for: COVID19        Anesthesia Evaluation  Patient summary reviewed and Nursing notes reviewed no history of anesthetic complications:   Airway: Mallampati: II  TM distance: >3 FB   Neck ROM: full  Mouth opening: > = 3 FB   Dental: normal exam         Pulmonary:normal exam    (+) sleep apnea: on noncompliant,  current smoker (~1 ppd)                           Cardiovascular:                      Neuro/Psych:               GI/Hepatic/Renal:   (+) GERD:,          ROS comment: Transaminitis    Gallstone pancreatitis, currently on TPN. Endo/Other:                     Abdominal:             Vascular: Other Findings:             Anesthesia Plan      general     ASA 2       Induction: intravenous. MIPS: Postoperative opioids intended.   Anesthetic plan and risks discussed with patient.                         Burton Fermin MD   2/26/2023

## 2023-02-27 ENCOUNTER — ANESTHESIA (OUTPATIENT)
Dept: SURGERY | Age: 58
DRG: 862 | End: 2023-02-27
Payer: COMMERCIAL

## 2023-02-27 ENCOUNTER — APPOINTMENT (OUTPATIENT)
Dept: CT IMAGING | Age: 58
DRG: 862 | End: 2023-02-27
Payer: COMMERCIAL

## 2023-02-27 LAB
ALBUMIN SERPL-MCNC: 3.1 G/DL (ref 3.5–5)
ALBUMIN/GLOB SERPL: 0.7 (ref 0.4–1.6)
ALP SERPL-CCNC: 85 U/L (ref 50–136)
ALT SERPL-CCNC: 44 U/L (ref 12–65)
ANION GAP SERPL CALC-SCNC: 4 MMOL/L (ref 2–11)
AST SERPL-CCNC: 15 U/L (ref 15–37)
BASOPHILS # BLD: 0 K/UL (ref 0–0.2)
BASOPHILS NFR BLD: 1 % (ref 0–2)
BILIRUB SERPL-MCNC: 0.6 MG/DL (ref 0.2–1.1)
BUN SERPL-MCNC: 19 MG/DL (ref 6–23)
CALCIUM SERPL-MCNC: 9.3 MG/DL (ref 8.3–10.4)
CHLORIDE SERPL-SCNC: 109 MMOL/L (ref 101–110)
CO2 SERPL-SCNC: 23 MMOL/L (ref 21–32)
CREAT SERPL-MCNC: 0.8 MG/DL (ref 0.8–1.5)
DIFFERENTIAL METHOD BLD: NORMAL
EOSINOPHIL # BLD: 0.3 K/UL (ref 0–0.8)
EOSINOPHIL NFR BLD: 4 % (ref 0.5–7.8)
ERYTHROCYTE [DISTWIDTH] IN BLOOD BY AUTOMATED COUNT: 12.2 % (ref 11.9–14.6)
GLOBULIN SER CALC-MCNC: 4.7 G/DL (ref 2.8–4.5)
GLUCOSE SERPL-MCNC: 122 MG/DL (ref 65–100)
HCT VFR BLD AUTO: 44.9 % (ref 41.1–50.3)
HGB BLD-MCNC: 15.3 G/DL (ref 13.6–17.2)
IMM GRANULOCYTES # BLD AUTO: 0 K/UL (ref 0–0.5)
IMM GRANULOCYTES NFR BLD AUTO: 0 % (ref 0–5)
INR PPP: 1.1
LYMPHOCYTES # BLD: 2.1 K/UL (ref 0.5–4.6)
LYMPHOCYTES NFR BLD: 34 % (ref 13–44)
MAGNESIUM SERPL-MCNC: 2.1 MG/DL (ref 1.8–2.4)
MCH RBC QN AUTO: 30.7 PG (ref 26.1–32.9)
MCHC RBC AUTO-ENTMCNC: 34.1 G/DL (ref 31.4–35)
MCV RBC AUTO: 90 FL (ref 82–102)
MONOCYTES # BLD: 0.6 K/UL (ref 0.1–1.3)
MONOCYTES NFR BLD: 10 % (ref 4–12)
NEUTS SEG # BLD: 3.2 K/UL (ref 1.7–8.2)
NEUTS SEG NFR BLD: 51 % (ref 43–78)
NRBC # BLD: 0 K/UL (ref 0–0.2)
PHOSPHATE SERPL-MCNC: 3.3 MG/DL (ref 2.5–4.5)
PLATELET # BLD AUTO: 334 K/UL (ref 150–450)
PMV BLD AUTO: 9.9 FL (ref 9.4–12.3)
POTASSIUM SERPL-SCNC: 3.9 MMOL/L (ref 3.5–5.1)
PROT SERPL-MCNC: 7.8 G/DL (ref 6.3–8.2)
PROTHROMBIN TIME: 14.6 SEC (ref 12.6–14.3)
RBC # BLD AUTO: 4.99 M/UL (ref 4.23–5.6)
SODIUM SERPL-SCNC: 136 MMOL/L (ref 133–143)
WBC # BLD AUTO: 6.2 K/UL (ref 4.3–11.1)

## 2023-02-27 PROCEDURE — C9113 INJ PANTOPRAZOLE SODIUM, VIA: HCPCS | Performed by: INTERNAL MEDICINE

## 2023-02-27 PROCEDURE — 2500000003 HC RX 250 WO HCPCS: Performed by: SURGERY

## 2023-02-27 PROCEDURE — 6360000004 HC RX CONTRAST MEDICATION: Performed by: SURGERY

## 2023-02-27 PROCEDURE — 2500000003 HC RX 250 WO HCPCS: Performed by: FAMILY MEDICINE

## 2023-02-27 PROCEDURE — 6360000002 HC RX W HCPCS: Performed by: INTERNAL MEDICINE

## 2023-02-27 PROCEDURE — 74177 CT ABD & PELVIS W/CONTRAST: CPT

## 2023-02-27 PROCEDURE — 85025 COMPLETE CBC W/AUTO DIFF WBC: CPT

## 2023-02-27 PROCEDURE — 6360000002 HC RX W HCPCS: Performed by: SURGERY

## 2023-02-27 PROCEDURE — 1100000000 HC RM PRIVATE

## 2023-02-27 PROCEDURE — A4216 STERILE WATER/SALINE, 10 ML: HCPCS | Performed by: INTERNAL MEDICINE

## 2023-02-27 PROCEDURE — 99232 SBSQ HOSP IP/OBS MODERATE 35: CPT | Performed by: SURGERY

## 2023-02-27 PROCEDURE — 2580000003 HC RX 258: Performed by: SURGERY

## 2023-02-27 PROCEDURE — 2580000003 HC RX 258: Performed by: INTERNAL MEDICINE

## 2023-02-27 PROCEDURE — 84100 ASSAY OF PHOSPHORUS: CPT

## 2023-02-27 PROCEDURE — 80053 COMPREHEN METABOLIC PANEL: CPT

## 2023-02-27 PROCEDURE — 83735 ASSAY OF MAGNESIUM: CPT

## 2023-02-27 PROCEDURE — 36415 COLL VENOUS BLD VENIPUNCTURE: CPT

## 2023-02-27 PROCEDURE — 85610 PROTHROMBIN TIME: CPT

## 2023-02-27 RX ORDER — SODIUM CHLORIDE, SODIUM LACTATE, POTASSIUM CHLORIDE, CALCIUM CHLORIDE 600; 310; 30; 20 MG/100ML; MG/100ML; MG/100ML; MG/100ML
INJECTION, SOLUTION INTRAVENOUS CONTINUOUS
Status: DISCONTINUED | OUTPATIENT
Start: 2023-02-27 | End: 2023-02-27

## 2023-02-27 RX ORDER — FENTANYL CITRATE 50 UG/ML
100 INJECTION, SOLUTION INTRAMUSCULAR; INTRAVENOUS
Status: DISCONTINUED | OUTPATIENT
Start: 2023-02-27 | End: 2023-02-27

## 2023-02-27 RX ORDER — SODIUM CHLORIDE 0.9 % (FLUSH) 0.9 %
5-40 SYRINGE (ML) INJECTION PRN
Status: DISCONTINUED | OUTPATIENT
Start: 2023-02-27 | End: 2023-02-27 | Stop reason: HOSPADM

## 2023-02-27 RX ORDER — SODIUM CHLORIDE 0.9 % (FLUSH) 0.9 %
5-40 SYRINGE (ML) INJECTION EVERY 12 HOURS SCHEDULED
Status: DISCONTINUED | OUTPATIENT
Start: 2023-02-27 | End: 2023-02-27 | Stop reason: HOSPADM

## 2023-02-27 RX ORDER — ACETAMINOPHEN 500 MG
1000 TABLET ORAL ONCE
Status: DISCONTINUED | OUTPATIENT
Start: 2023-02-27 | End: 2023-02-27 | Stop reason: HOSPADM

## 2023-02-27 RX ORDER — SODIUM CHLORIDE 9 MG/ML
INJECTION, SOLUTION INTRAVENOUS PRN
Status: DISCONTINUED | OUTPATIENT
Start: 2023-02-27 | End: 2023-02-27 | Stop reason: HOSPADM

## 2023-02-27 RX ORDER — MIDAZOLAM HYDROCHLORIDE 2 MG/2ML
2 INJECTION, SOLUTION INTRAMUSCULAR; INTRAVENOUS
Status: DISCONTINUED | OUTPATIENT
Start: 2023-02-27 | End: 2023-02-27 | Stop reason: HOSPADM

## 2023-02-27 RX ORDER — LIDOCAINE HYDROCHLORIDE 10 MG/ML
1 INJECTION, SOLUTION INFILTRATION; PERINEURAL
Status: DISCONTINUED | OUTPATIENT
Start: 2023-02-27 | End: 2023-02-27 | Stop reason: HOSPADM

## 2023-02-27 RX ORDER — SODIUM CHLORIDE 0.9 % (FLUSH) 0.9 %
10 SYRINGE (ML) INJECTION
Status: DISCONTINUED | OUTPATIENT
Start: 2023-02-27 | End: 2023-02-27

## 2023-02-27 RX ORDER — 0.9 % SODIUM CHLORIDE 0.9 %
100 INTRAVENOUS SOLUTION INTRAVENOUS
Status: DISCONTINUED | OUTPATIENT
Start: 2023-02-27 | End: 2023-02-27

## 2023-02-27 RX ADMIN — SOYBEAN OIL 250 ML: 20 INJECTION, SOLUTION INTRAVENOUS at 18:11

## 2023-02-27 RX ADMIN — ALTEPLASE 1 MG: 2.2 INJECTION, POWDER, LYOPHILIZED, FOR SOLUTION INTRAVENOUS at 13:37

## 2023-02-27 RX ADMIN — PIPERACILLIN AND TAZOBACTAM 3375 MG: 3; .375 INJECTION, POWDER, LYOPHILIZED, FOR SOLUTION INTRAVENOUS at 22:09

## 2023-02-27 RX ADMIN — PIPERACILLIN AND TAZOBACTAM 3375 MG: 3; .375 INJECTION, POWDER, LYOPHILIZED, FOR SOLUTION INTRAVENOUS at 04:10

## 2023-02-27 RX ADMIN — DIATRIZOATE MEGLUMINE AND DIATRIZOATE SODIUM 15 ML: 660; 100 LIQUID ORAL; RECTAL at 07:17

## 2023-02-27 RX ADMIN — SODIUM CHLORIDE, PRESERVATIVE FREE 10 ML: 5 INJECTION INTRAVENOUS at 08:21

## 2023-02-27 RX ADMIN — DIPHENHYDRAMINE HYDROCHLORIDE 25 MG: 50 INJECTION, SOLUTION INTRAMUSCULAR; INTRAVENOUS at 22:09

## 2023-02-27 RX ADMIN — SODIUM CHLORIDE, PRESERVATIVE FREE 10 ML: 5 INJECTION INTRAVENOUS at 22:09

## 2023-02-27 RX ADMIN — IOPAMIDOL 100 ML: 755 INJECTION, SOLUTION INTRAVENOUS at 09:45

## 2023-02-27 RX ADMIN — PANTOPRAZOLE SODIUM 40 MG: 40 INJECTION, POWDER, LYOPHILIZED, FOR SOLUTION INTRAVENOUS at 08:19

## 2023-02-27 RX ADMIN — POTASSIUM PHOSPHATE, MONOBASIC POTASSIUM PHOSPHATE, DIBASIC: 224; 236 INJECTION, SOLUTION, CONCENTRATE INTRAVENOUS at 18:11

## 2023-02-27 RX ADMIN — PIPERACILLIN AND TAZOBACTAM 3375 MG: 3; .375 INJECTION, POWDER, LYOPHILIZED, FOR SOLUTION INTRAVENOUS at 12:09

## 2023-02-27 ASSESSMENT — PAIN - FUNCTIONAL ASSESSMENT: PAIN_FUNCTIONAL_ASSESSMENT: 0-10

## 2023-02-27 ASSESSMENT — PAIN SCALES - GENERAL: PAINLEVEL_OUTOF10: 0

## 2023-02-27 NOTE — PROGRESS NOTES
H&P/Consult Note/Progress Note/Office Note:   Harshil Rodas MRN: [id number]  :1965  Age:57 y.o.    HPI: Harshil Rodas is a 61 y.o. male who was admitted by the hospitalist on 23 after he presented with epigastric pain. He reported severe nausea beginning in 2023 with p.o. intake  This was associated with upper abdominal discomfort and dark urine  He was prescribed Protonix by his primary care at first which did not help  He then had US below and was referred to GI    [facility]  Liver: Intrahepatic biliary duct dilatation is present. No focal liver lesions visualized. Gallbladder: The gallbladder is distended and contains sludge. No pericholecystic fluid. No sonographic Pop sign. Common bile duct: Dilated to 10 mm. Pancreas: The body the pancreas is seen and there is pancreatic duct dilatation to 4 mm in the body the pancreas. The head and tail the pancreas are not adequately visualized by ultrasound. Right kidney: Normal.     IMPRESSION:     Common duct dilatation, intrahepatic biliary duct dilatation, and gallbladder distention likely represents biliary obstruction. Superimposed pancreatic duct dilatation is also present. This constellation of findings can be seen in the setting of a pancreatic head mass. The head of the pancreas is not adequately seen on today's exam.   I would recommend pancreatic mass protocol CT and gastroenterology consultation. 23 ERCP with stent; Dr Sonny Bruner:  Biliary stricture just proximal to ampulla. Could visualize this area post papillotomy. Sludge and cloudy bile extracted. Able to pass 12 mm balloon with resistance. Good drainage with stent. Pancreas- not able to cannulate. GB- ? Sludge vs small stones  Biggest concern would a small pancreatic head tumor          23 EUS; Dr Franki Graham:   1.  Extensive pancreatic parenchymal abnormalities are consistent with chronic pancreatitis based on EUS criteria. It should be noted that the presence of chronic pancreatitis decreases the sensitivity of EUS for detection of pancreatic tumors. 2. 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 FNA was performed. 3. Pancreatic cyst. Morphologic features are most typical for pseudocyst. This is currently too small to warrant FNA. Soon after the EUS that he developed worsening abdominal pain and came to the ER. He denies prior abdominal surgery. He is not taking blood thinners. In ER on 2/19/23 wbc 15.8k, hgb 16.7, plt 306k  T bili 1.4, AST/ALT 12/42, alk [id number]  Lipase 1088  Gen Surgery was consulted after the below CT       2/18/23 CT abd/pelvis with IV contrast    Hx: upper abd pain s/p ERCP     Lower Chest: Scattered atelectasis in the lung bases. Liver: Normal.     Gallbladder/Billary: The gallbladder is inflamed. 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 A biliary stent is in good position. Pancreas: Normal.     Spleen: Normal.     Adrenal Glands: Normal.     Kidneys: Normal.     GI Tract: The stomach and duodenum are unremarkable. Normal appendix. Normal small bowel. Mesentery/Peritoneum: Hazy edema and inflammation in the upper central mesentery. No free intraperitoneal air. Vasculature: Normal.     Lymph Nodes: Normal.     Abdominal Wall: See musculoskeletal section. Bladder: Normal.     Reproductive: Normal.     Musculoskeletal: Normal.     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 2. Biliary stent in good position         2/27/23 CT abd/pelvis with oral and IV contrast      FINDINGS: -Lung Bases: The lung bases are clear.     -Liver: Appears uniform. -Gallbladder: Gallbladder wall appears thickened. The gallbladder gas has resolved. -Bile Ducts: Not dilated. Biliary stent is present.     -Pancreas: Enhances uniformly.  Pancreatic stranding densities a small amount of fluid remain.   -Spleen: Uniform and normal size.     -Stomach: Unremarkable. -Bowel: Normal caliber. Long normal appendix with tip in the inferior margin of the liver   Some fatty stratification of the terminal ileum   Small soft tissue density and bubble of gas is seen in the right ischial rectal fossa suggesting an infralevator ani perianal fistula. -Adrenals: Are normal size.   -Kidneys/Ureters: Enhance symmetrically. No hydronephrosis. -Urinary Bladder: Unremarkable. -Reproductive Organs: Prostate calcifications     -Lymph Nodes: No grossly enlarged retroperitoneal, mesenteric, or pelvic adenopathy.   -Vasculature: Aorta is normal caliber. Retroaortic left renal vein, normal variant.   -Bones: No gross bony lesions.     -Other: No ascites. Impression:  1) Mild gallbladder wall thickening although the gas within the lumen has resolved. 2) Small soft tissue density and bubble of gas is seen in the right ischial rectal fossa suggesting an infralevator ani perianal fistula. 3) Fatty stratification of the terminal ileum suggesting chronic inflammation. Regional enteritis should be considered. Additional hx:  2/21/23 feels much better; still with some epigastric pain; ambulating in room  2/23/23 abd pain improved; AF; TPN/NPO;  IV Zosyn for cholecysto-colic fistula on CT  7/84/58 feels better; AF; WBC 6.3k; On TPN for pancreatitis; repeat CT Monday 2/25/23 Feels OK; AF; ON TPN for pancreatitis and cholecysto-colic fistula suspected on initial; CT; Repeat CT Monday am ordered; OR Monday afternoon if needed  2/26/23 some mild LUQ pain this am;  AF/VSS; WBC normal; repeat CT in am; possible surgery Monday afternoon 2/27/23 comfortable, no anorectal pain (see CT);  AF/VSS; WBC normal ; Repeat CT this am with oral and IV contrast shows improvement;  No surgery today          Past Medical History:   Diagnosis Date    GERD (gastroesophageal reflux disease)     medication    Sleep apnea     has a CPAP but does not use     Past Surgical History:   Procedure Laterality Date    COLONOSCOPY      ERCP N/A 2/1/2023    ERCP SPHINCTER/PAPILLOTOMY/BALLOON SWEEP/STENT PLACEMENT performed by Rhonda Antonio MD at [facility] ENDOSCOPY    ERCP W/ PLASTIC STENT PLACEMENT  02/01/2023    SINUS SURGERY      x3    VASECTOMY       Current Facility-Administered Medications   Medication Dose Route Frequency    PN-Adult Premix 5/15 - Central   IntraVENous Continuous TPN    melatonin tablet 6 mg  6 mg Oral Nightly PRN    fat emulsion 20 % infusion 250 mL  250 mL IntraVENous Once per day on Mon Wed Fri    potassium chloride 20 mEq/50 mL IVPB (Central Line)  20 mEq IntraVENous PRN    Or    potassium chloride 10 mEq/100 mL IVPB (Peripheral Line)  10 mEq IntraVENous PRN    magnesium sulfate 2000 mg in 50 mL IVPB premix  2,000 mg IntraVENous PRN    sodium phosphate 10 mmol in sodium chloride 0.9 % 250 mL IVPB  10 mmol IntraVENous PRN    Or    sodium phosphate 15 mmol in sodium chloride 0.9 % 250 mL IVPB  15 mmol IntraVENous PRN    Or    sodium phosphate 20 mmol in sodium chloride 0.9 % 500 mL IVPB  20 mmol IntraVENous PRN    diphenhydrAMINE (BENADRYL) injection 25 mg  25 mg IntraVENous Nightly PRN    sodium chloride flush 0.9 % injection 5-40 mL  5-40 mL IntraVENous 2 times per day    sodium chloride flush 0.9 % injection 5-40 mL  5-40 mL IntraVENous PRN    0.9 % sodium chloride infusion  25 mL IntraVENous PRN    HYDROmorphone (DILAUDID) injection 1 mg  1 mg IntraVENous Q4H PRN    HYDROmorphone (DILAUDID) injection 0.5 mg  0.5 mg IntraVENous Q4H PRN    sodium chloride flush 0.9 % injection 5-40 mL  5-40 mL IntraVENous PRN    0.9 % sodium chloride infusion   IntraVENous PRN    ondansetron (ZOFRAN-ODT) disintegrating tablet 4 mg  4 mg Oral Q8H PRN    Or    ondansetron (ZOFRAN) injection 4 mg  4 mg IntraVENous Q6H PRN    acetaminophen (TYLENOL) tablet 650 mg  650 mg Oral Q6H PRN piperacillin-tazobactam (ZOSYN) 3,375 mg in sodium chloride 0.9 % 50 mL IVPB (mini-bag)  3,375 mg IntraVENous q8h    pantoprazole (PROTONIX) 40 mg in sodium chloride (PF) 0.9 % 10 mL injection  40 mg IntraVENous Daily     ALLERGIES:  Patient has no known allergies. Social History     Socioeconomic History    Marital status:    Tobacco Use    Smoking status: Every Day     Packs/day: 0.50     Years: 40.00     Pack years: 20.00     Types: Cigarettes    Smokeless tobacco: Never   Vaping Use    Vaping Use: Never used   Substance and Sexual Activity    Alcohol use: Not Currently    Drug use: Not Currently     Social History     Tobacco Use   Smoking Status Every Day    Packs/day: 0.50    Years: 40.00    Pack years: 20.00    Types: Cigarettes   Smokeless Tobacco Never     No family history on file. ROS: The patient has no difficulty with chest pain or shortness of breath. No fever or chills. Comprehensive review of systems was otherwise unremarkable except as noted above. Physical Exam:   /74   Pulse 58   Temp 97.5 °F (36.4 °C) (Oral)   Resp 18   Ht 6' (1.829 m)   Wt 180 lb 5.4 oz (81.8 kg)   SpO2 96%   BMI 24.46 kg/m²   Vitals:    02/26/23 2357 02/27/23 0027 02/27/23 0155 02/27/23 0332   BP: 102/68   104/74   Pulse:   62 58   Resp: 18 16  18   Temp:    97.5 °F (36.4 °C)   TempSrc:    Oral   SpO2:    96%   Weight:       Height:         02/26 1901 - 02/27 0700  In: 1126.1 [I.V.:1126.1]  Out: 1100 [Urine:1100]  02/25 0701 - 02/26 1900  In: 1380 [P.O.:50; I.V.:4620]  Out: 7544 [Urine:2425]    Constitutional: Alert, oriented, cooperative patient in no acute distress; appears stated age    Eyes:Sclera are clear. EOMs intact  ENMT: no external lesions gross hearing normal; no obvious neck masses, no ear or lip lesions, nares normal  CV: RRR. Normal perfusion  Resp: No JVD. Breathing is  non-labored; no audible wheezing.       GI: soft and non-distended, no upper abd pain       Musculoskeletal: unremarkable with normal function. No embolic signs or cyanosis. Neuro:  Oriented; moves all 4; no focal deficits  Psychiatric: normal affect and mood, no memory impairment    Recent vitals (if inpt):  Patient Vitals for the past 24 hrs:   BP Temp Temp src Pulse Resp SpO2   02/27/23 0332 104/74 97.5 °F (36.4 °C) Oral 58 18 96 %   02/27/23 0155 -- -- -- 62 -- --   02/27/23 0027 -- -- -- -- 16 --   02/26/23 2357 102/68 -- -- -- 18 --   02/26/23 2312 95/66 97.5 °F (36.4 °C) Oral 61 18 97 %   02/26/23 1939 111/81 97.7 °F (36.5 °C) Oral 57 19 96 %   02/26/23 1910 -- -- -- 60 16 --   02/26/23 1553 97/70 97.4 °F (36.3 °C) Axillary 59 18 99 %   02/26/23 1127 94/60 97.3 °F (36.3 °C) Oral 63 18 95 %   02/26/23 0729 93/64 97.3 °F (36.3 °C) Oral 62 17 95 %       Amount and/or Complexity of Data Reviewed and Analyzed:  I reviewed and analyzed all of the unique labs and radiologic studies that are shown below as well as any that are in the HPI, and any that are in the expanded problem list below  *Each unique test, order, or document contributes to the combination of 2 or combination of 3 in Category 1 below. For this visit I also reviewed old records and prior notes.       Recent Labs     02/26/23  0554 02/27/23  0554   WBC 5.6 6.2   HGB 14.6 15.3    334     --    K 4.4  --      --    CO2 23  --    BUN 18  --      Review of most recent CBC  Lab Results   Component Value Date    WBC 6.2 02/27/2023    HGB 15.3 02/27/2023    HCT 44.9 02/27/2023    MCV 90.0 02/27/2023     02/27/2023       Review of most recent BMP  Lab Results   Component Value Date/Time     02/26/2023 05:54 AM    K 4.4 02/26/2023 05:54 AM     02/26/2023 05:54 AM    CO2 23 02/26/2023 05:54 AM    BUN 18 02/26/2023 05:54 AM    CREATININE 0.80 02/26/2023 05:54 AM    GLUCOSE 118 02/26/2023 05:54 AM    CALCIUM 9.4 02/26/2023 05:54 AM        Review of most recent LFTs (and lipase if done)  Lab Results   Component Value Date    ALT 19 02/21/2023    AST 14 (L) 02/21/2023    ALKPHOS 80 02/21/2023    BILITOT 1.1 02/21/2023     Lab Results   Component Value Date    LIPASE 130 02/21/2023       No results found for: INR, APTT, LCAD    Review of most recent HgbA1c  No results found for: LABA1C    Nutritional assessment screen for wound healing issues:  Lab Results   Component Value Date    LABALBU 2.6 (L) 02/21/2023       @lastcovr@    Xray Result (most recent):  No results found for this or any previous visit from the past 3650 days. CT Result (most recent):  CT ABDOMEN PELVIS W IV CONTRAST 02/18/2023    Addendum 2/18/2023 10:25 PM  ADDENDUM:    Case discussed with MD.    Axial image 29 appears to show fluid in the lumen of the gallbladder  communicating with fluid in the lumen of the colon. This is a subtle finding;  artifact cannot be completely excluded. Coronal images 23 and 24 show extraluminal fluid density abutting the superior  margin of the colon where it abuts the gallbladder. Sari Tirado M.D.  2/18/2023 10:25:00 PM    Addendum 2/18/2023  9:07 PM  ADDENDUM:    Edgar Orourke 89    Sari Tirado M.D.  2/18/2023 9:07:00 PM    Narrative  EXAMINATION: CT SCAN OF THE ABDOMEN AND PELVIS WITH INTRAVENOUS CONTRAST    DATE OF EXAM: 2/18/2023 5:15 PM    HISTORY: upper abd pain s/p ERCP    COMPARISON: None. TECHNIQUE: CT examination of the abdomen and pelvis was performed following the  intravenous administration of 100 mL of Isovue-370. CT dose lowering techniques  were used, to include: automated exposure control, adjustment for patient size,  and/or use of iterative reconstruction. FINDINGS:    ABDOMEN/PELVIS:    Lower Chest: Scattered atelectasis in the lung bases. Liver: Normal.    Gallbladder/Billary: The gallbladder is inflamed. 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 A biliary  stent is in good position.     Pancreas: Normal.    Spleen: Normal.    Adrenal Glands: Normal.    Kidneys: Normal.    GI Tract: The stomach and duodenum are unremarkable. Normal appendix. Normal  small bowel. Mesentery/Peritoneum: Hazy edema and inflammation in the upper central  mesentery. No free intraperitoneal air. Vasculature: Normal.    Lymph Nodes: Normal.    Abdominal Wall: See musculoskeletal section. Bladder: Normal.    Reproductive: Normal.    Musculoskeletal: Normal.    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 2. Biliary stent in good position. Khushboo Campbell M.D.  2/18/2023 8:58:00 PM    US Result (most recent):  No results found for this or any previous visit from the past 3650 days.       Admission date (for inpatients): 2/18/2023   * No surgery date entered *  * No surgery found *        ASSESSMENT/PLAN:  [unfilled]  Principal Problem:    Post-ERCP acute pancreatitis  Active Problems:    Biliary stricture    Abdominal pain    Hyponatremia    Tobacco use    Pancreatitis    Acute cholecystitis  Resolved Problems:    Sepsis [facility])     Patient Active Problem List    Diagnosis Date Noted    Post-ERCP acute pancreatitis 02/22/2023     Priority: Medium    Biliary stricture 02/18/2023     Priority: Medium    Abdominal pain 02/18/2023     Priority: Medium    Hyponatremia 02/18/2023     Priority: Medium    Tobacco use 02/18/2023     Priority: Medium    Pancreatitis 02/18/2023     Priority: Medium    Acute cholecystitis 02/18/2023     Priority: Medium     Added automatically from request for surgery [id number]            Number and Complexity of Problems addressed and   Risks of complications and/or morbidity of management              Abnormal GB on CT  Repeat CT on 2/27/23 makes cholecysto-colic fistula less likely as there is no longer any gas in GB  The initial gas in the GB may have been from the ERCP for stent placement  On CT there is also lots of oral contrast in proximal transverse colon adjacent to GB with no extravasation of contrast into GB.    I think if we hold off on surgery longer there will be a greater chance of being able to perform the cholecystectomy laparoscopically  Trial of clears today  If he tolerates this he could go home tomorrow on 2 weeks of Augmentin  Will scheduled lap pennie electively as outpt    IV Zosyn while inpt  Resolving acute pancreatitis and peripancreatic edema        Acute Pancreatitis after EUS and FNA of pancreas  Improved pancreatic edema on CT 2/27/28  TPN via PICC   Morphine ordered for pain  FNA biopsy showed no evidence of malignancy  Etiology of distal CBD stricture most likely multifactorial recurrent pancreatitis (alcohol, gallstones/CBD stone)            Level of MDM (2/3 elements below)  Number and Complexity of Problems Addressed Amount and/or Complexity of Data to be Reviewed and Analyzed  *Each unique test, order, or document contributes to the combination of 2 or combination of 3 in Category 1 below. Risk of Complications and/or Morbidity or Mortality of pt Management     38616  05534 SF Minimal  ?1self-limited or minor problem Minimal or none Minimal risk of morbidity from additional diagnostic testing or Rx   76481  61435 Low Low  ? 2or more self-limited or minor problems;    or  ? 1stable chronic illness;    or  ?1acute, uncomplicated illness or injury   Limited  (Must meet the requirements of at least 1 of the 2 categories)  Category 1: Tests and documents   ? Any combination of 2 from the following:  ?Review of prior external note(s) from each unique source*;  ?review of the result(s) of each unique test*;   ?ordering of each unique test*    or   Category 2: Assessment requiring an independent historian(s)  (For the categories of independent interpretation of tests and discussion of management or test interpretation, see moderate or high) Low risk of morbidity from additional diagnostic testing or treatment     59830  29223 Mod Moderate  ? 1or more chronic illnesses with exacerbation, progression, or side effects of treatment;    or  ?2or more stable chronic illnesses;    or  ?1undiagnosed new problem with uncertain prognosis;    or  ?1acute illness with systemic symptoms;    or  ?1acute complicated injury   Moderate  (Must meet the requirements of at least 1 out of 3 categories)  Category 1: Tests, documents, or independent historian(s)  ? Any combination of 3 from the following:   ?Review of prior external note(s) from each unique source*;  ?Review of the result(s) of each unique test*;  ?Ordering of each unique test*;  ?Assessment requiring an independent historian(s)    or  Category 2: Independent interpretation of tests   ? Independent interpretation of a test performed by another physician/other qualified health care professional (not separately reported);     or  Category 3: Discussion of management or test interpretation  ? Discussion of management or test interpretation with external physician/other qualified health care professional/appropriate source (not separately reported)   Moderate risk of morbidity from additional diagnostic testing or treatment  Examples only:  ?Prescription drug management   ? Decision regarding minor surgery with identified patient or procedure risk factors  ? Decision regarding elective major surgery without identified patient or procedure risk factors   ? Diagnosis or treatment significantly limited by social determinants of health       [id number] High High  ? 1or more chronic illnesses with severe exacerbation, progression, or side effects of treatment;    or  ?1 acute or chronic illness or injury that poses a threat to life or bodily function   Extensive  (Must meet the requirements of at least 2 out of 3 categories)  Category 1: Tests, documents, or independent historian(s)  ? Any combination of 3 from the following:   ?Review of prior external note(s) from each unique source*;  ?Review of the result(s) of each unique test*;   ?Ordering of each unique test*;   ?Assessment requiring an independent historian(s)    or   Category 2: Independent interpretation of tests   ? Independent interpretation of a test performed by another physician/other qualified health care professional (not separately reported);     or  Category 3: Discussion of management or test interpretation  ? Discussion of management or test interpretation with external physician/other qualified health care professional/appropriate source (not separately reported)   High risk of morbidity from additional diagnostic testing or treatment  Examples only:  ?Drug therapy requiring intensive monitoring for toxicity  ? Decision regarding elective major surgery with identified patient or procedure risk factors  ? Decision regarding emergency major surgery  ? Decision regarding hospitalization  ? Decision not to resuscitate or to de-escalate care because of poor prognosis      Parenteral controlled substances             I have personally performed a face-to-face diagnostic evaluation and management  service on this patient. I have independently seen the patient. I have independently obtained the above history from the patient/family. I have independently examined the patient with above findings. I have independently reviewed data/labs for this patient and developed the above plan of care (MDM).       Signed: Albino Kline MD  2/27/2023    6:47 AM

## 2023-02-27 NOTE — PROGRESS NOTES
END OF SHIFT NOTE:    INTAKE/OUTPUT  02/26 0701 - 02/27 0700  In: 3402.1 [P.O.:50; I.V.:3352.1]  Out: 1900 [Urine:1900]  Voiding: Yes  Catheter: No  Drain:              Flatus: Patient does have flatus present. Stool: 1 occurrences. Characteristics:  Stool Appearance: Watery  Stool Color: Brown  Stool Amount: Small  Stool Assessment  Incontinence: No  Stool Appearance: Watery  Stool Color: Brown  Stool Amount: Small  Stool Source: Rectum  Last BM (including prior to admit): 02/26/23    Emesis: 0 occurrences. Characteristics:        VITAL SIGNS  Patient Vitals for the past 12 hrs:   Temp Pulse Resp BP SpO2   02/27/23 1503 97.7 °F (36.5 °C) 59 16 102/74 96 %   02/27/23 1124 98 °F (36.7 °C) 63 16 109/61 96 %   02/27/23 0741 98.2 °F (36.8 °C) 61 16 105/69 98 %       Pain Assessment  Pain Level: 0 (02/27/23 1124)  Pain Location: Abdomen, Back  Patient's Stated Pain Goal: 3    Ambulating  Yes    Shift report given to oncoming nurse at the bedside.     Vicente Mccall RN

## 2023-02-27 NOTE — PROGRESS NOTES
Comprehensive Nutrition Assessment    Type and Reason for Visit: Reassess  Malnutrition Screening Tool: Malnutrition Screen  Have you recently lost weight without trying?: 2 to 13 pounds (1 point)  Have you been eating poorly because of a decreased appetite?: Yes (1 point)  Malnutrition Screening Tool Score: 2  TPN Management (Hospitalist now signed off orders per Surgery as of 2/26)    Nutrition Recommendations/Plan:   Parenteral Nutrition:  Central parenteral nutrition  new bag to begin at 1800 today  Continue: Dex 15%, 5% AA 2 L (85ml/hr)   Continue 250 ml 20% lipids 3 x/week (MWF)  Lytes/L:   Sodium ( 70 meq NaCl), Potassium ( 20 meq KCl) Phosphorus ( 5 mmol KPO4), Calcium (4.5 meq), Magnesium 5 meq   Other additives:   MVI Monday Wednesday Friday due to [employer], MTE daily  Nutrition Related Medication Management:  Electrolyte Replacement Protocol PRN Potassium, Phosphorus, and Magnesium   Labs:   BMP daily  Mg MWF  Phos MWF    Triglyceride weekly on Tuesdays  POC Glucoses/SSI Not indicated  IF TPN is not indicated per surgery it should be discontinued prior to 1330 today (Rx cut-off times for orders). Malnutrition Assessment:  Malnutrition Status: At risk for malnutrition (Comment) (weight loss, variable po over last 2 months)    No kimberly wasting  Nutrition Assessment:  Nutrition History: Patient reports that prior to Feb 1st he has been losing weight and not eating well. He reports usual weight of 210 lbs and got down to about 183#s before the procedure 2/1/23. He says following the procedure we started to eat again very well with no issues and gained weight back to at least 190 #s. He reports he than had procedure on 2/16 following that he has been getting severe abdominal pain that is worse when eating/drinking. He reports able to eat breakfast Friday morning with no N/V but significant pain.       Do You Have Any Cultural, Yazdanism, or Ethnic Food Preferences?: No   Nutrition Background: Wound Type: None   PMH only significant for GERD. Recent GI complaints followed by GI s/p ERCP with biliary stent on 2-1-23, EUS w/ FNA 2-16-23 who presented due to abdominal pain. Elevated lipase on admission. 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 Surgery consulted but no surgery for now awaiting resolution of pancreatitis. Nutrition Interval:  2/21: Double lumen PICC, PPN started without lipids. 2/22: advanced to TPN with lipids x 3/week. Remains TPN dependent, CT completed, results pending at RD visit. TPN infusing at goal.  Potential for OR today per surgery note. Discussed with Obdulio Aguilar RN. Abdominal Status (last documented):   Last BM (including prior to admit): 02/26/23, GI Symptoms: Flatus   Pertinent Medications: Protonix, Zosyn  Continuous: none  Electrolyte Replacement:  electrolyte protocol active  PRN: Zofran (no admin), gastrografin 2/27  Pertinent Labs:   Lab Results   Component Value Date/Time     02/27/2023 05:54 AM    K 3.9 02/27/2023 05:54 AM     02/27/2023 05:54 AM    CO2 23 02/27/2023 05:54 AM    BUN 19 02/27/2023 05:54 AM    CREATININE 0.80 02/27/2023 05:54 AM    GLUCOSE 122 02/27/2023 05:54 AM    CALCIUM 9.3 02/27/2023 05:54 AM    PHOS 3.3 02/27/2023 05:54 AM    MG 2.1 02/27/2023 05:54 AM      Lab Results   Component Value Date/Time    TRIG 184 02/23/2023 04:50 AM    TRIG 100 02/22/2023 04:55 AM   Bili 1.1.  (2/21)  Remarkable for: remain relatively stable, Na w consistent slow trend down s/p volume and content change on 2/26, remains on zosyn (increased in PN 2/26) stable today, K w slight decrease (decreased in PN 2/25 and 2/26 secondary prior trend), phos decreasing (decreased 2/24) and Mg remains stable. Glucose w persistent mild elevation. TG appropriate for proceeding w lipids 3 x weekly. Will increase K and Phos in TPN tonight.       Current Nutrition Therapies:  Diet NPO Exceptions are: Ice Chips  PN-Adult Premix 5/15 - Central  Current Parenteral Nutrition Orders:  Type and Formula: Premix Central (Dex 15%, 5% AA)   Lipids: 250ml, Three times weekly  Duration: Continuous  Rate/Volume: 2 L (85ml/hr)  Current PN Order Provides: 1634 kcal average/d (93% of needs), 100 grams of protein/d (100% of needs), 300 grams of CHO/d and 2147 ml average total volume/d    Current Intake:   Average Meal Intake: NPO Average Supplements Intake: NPO      Anthropometric Measures:  Height: 6' (182.9 cm)  Current Body Wt: 180 lb 5.4 oz (81.8 kg) (2/24), Weight source: Not Specified  BMI: 24.5, Normal Weight (BMI 22.0 to 24.9) age over 72     Ideal Body Weight (Kg) (Calculated): 81 kg (178 lbs), 109.4 %  Usual Body Wt: 210 lb (95.3 kg), Percent weight change: -7.3       BMI Category Normal Weight (BMI 22.0 to 24.9) age over 72  Estimated Daily Nutrient Needs:  Energy (kcal/day): 7555-2911 (20-25 kcal/kg) (Kcal/kg Weight used: 88.3 kg Current (2/19)  Protein (g/day):  (1-1.2 g/kg) Weight Used: (Current) 88.3 kg  Fluid (ml/day):   (1 ml/kcal)    Nutrition Diagnosis:   Inadequate oral intake related to altered GI function as evidenced by NPO or clear liquid status due to medical condition, NPO status  Nutrition Interventions:   Food and/or Nutrient Delivery: Modify Parenteral Nutrition     Coordination of Nutrition Care: Continue to monitor while inpatient       Goals:   Previous Goal Met: Goal(s) Achieved  Active Goal:  (Maintain PN for primary needs)       Nutrition Monitoring and Evaluation:      Food/Nutrient Intake Outcomes: Parenteral Nutrition Intake/Tolerance, Diet Advancement/Tolerance  Physical Signs/Symptoms Outcomes: Biochemical Data, GI Status, Fluid Status or Edema, Weight    Discharge Planning:     Too soon to determine    Ty Ronnie Omari 23, [address]

## 2023-02-27 NOTE — PROGRESS NOTES
END OF SHIFT NOTE:    INTAKE/OUTPUT  02/26 0701 - 02/27 0700  In: 3402.1 [P.O.:50; I.V.:3352.1]  Out: 1900 [Urine:1900]  Voiding: Yes  Catheter: Yes  Drain:              Flatus: Patient does have flatus present. Stool: 1 occurrences. Characteristics:  Stool Appearance: Watery  Stool Color: Brown  Stool Amount: Small  Stool Assessment  Incontinence: No  Stool Appearance: Watery  Stool Color: Brown  Stool Amount: Small  Stool Source: Rectum  Last BM (including prior to admit): 02/26/23    Emesis: 0 occurrences. Characteristics:        VITAL SIGNS  Patient Vitals for the past 12 hrs:   Temp Pulse Resp BP SpO2   02/27/23 0332 97.5 °F (36.4 °C) 58 18 104/74 96 %   02/27/23 0155 -- 62 -- -- --   02/27/23 0027 -- -- 16 -- --   02/26/23 2357 -- -- 18 102/68 --   02/26/23 2312 97.5 °F (36.4 °C) 61 18 95/66 97 %   02/26/23 1939 97.7 °F (36.5 °C) 57 19 111/81 96 %   02/26/23 1910 -- 60 16 -- --       Pain Assessment  Pain Level: 0 (02/27/23 0027)  Pain Location: Abdomen, Back  Patient's Stated Pain Goal: 0 - No pain    Ambulating  Yes    Shift report given to oncoming nurse at the bedside.     Cecilia Key RN

## 2023-02-27 NOTE — CARE COORDINATION
LOS 9d    CM following patient's plan of care and will assist with patient's supportive needs/referrals pending patient's clinical progression.

## 2023-02-27 NOTE — PLAN OF CARE
Problem: Discharge Planning  Goal: Discharge to home or other facility with appropriate resources  Outcome: Progressing     Problem: Pain  Goal: Verbalizes/displays adequate comfort level or baseline comfort level  Outcome: Progressing  Flowsheets (Taken 2/26/2023 3461 by Rian Mcallister RN)  Verbalizes/displays adequate comfort level or baseline comfort level:   Encourage patient to monitor pain and request assistance   Assess pain using appropriate pain scale   Administer analgesics based on type and severity of pain and evaluate response   Implement non-pharmacological measures as appropriate and evaluate response     Problem: Safety - Adult  Goal: Free from fall injury  Outcome: Progressing  Flowsheets (Taken 2/27/2023 5302)  Free From Fall Injury: Instruct family/caregiver on patient safety     Problem: ABCDS Injury Assessment  Goal: Absence of physical injury  Outcome: Progressing

## 2023-02-28 VITALS
HEART RATE: 58 BPM | RESPIRATION RATE: 18 BRPM | DIASTOLIC BLOOD PRESSURE: 78 MMHG | SYSTOLIC BLOOD PRESSURE: 101 MMHG | HEIGHT: 72 IN | BODY MASS INDEX: 24.65 KG/M2 | WEIGHT: 182 LBS | OXYGEN SATURATION: 98 % | TEMPERATURE: 97.7 F

## 2023-02-28 LAB
ANION GAP SERPL CALC-SCNC: 5 MMOL/L (ref 2–11)
BASOPHILS # BLD: 0.1 K/UL (ref 0–0.2)
BASOPHILS NFR BLD: 1 % (ref 0–2)
BUN SERPL-MCNC: 16 MG/DL (ref 6–23)
CALCIUM SERPL-MCNC: 9.4 MG/DL (ref 8.3–10.4)
CHLORIDE SERPL-SCNC: 110 MMOL/L (ref 101–110)
CO2 SERPL-SCNC: 22 MMOL/L (ref 21–32)
CREAT SERPL-MCNC: 0.8 MG/DL (ref 0.8–1.5)
DIFFERENTIAL METHOD BLD: ABNORMAL
EOSINOPHIL # BLD: 0.4 K/UL (ref 0–0.8)
EOSINOPHIL NFR BLD: 5 % (ref 0.5–7.8)
ERYTHROCYTE [DISTWIDTH] IN BLOOD BY AUTOMATED COUNT: 12.2 % (ref 11.9–14.6)
GLUCOSE SERPL-MCNC: 132 MG/DL (ref 65–100)
HCT VFR BLD AUTO: 44.2 % (ref 41.1–50.3)
HGB BLD-MCNC: 15.5 G/DL (ref 13.6–17.2)
IMM GRANULOCYTES # BLD AUTO: 0 K/UL (ref 0–0.5)
IMM GRANULOCYTES NFR BLD AUTO: 0 % (ref 0–5)
LYMPHOCYTES # BLD: 2.6 K/UL (ref 0.5–4.6)
LYMPHOCYTES NFR BLD: 38 % (ref 13–44)
MAGNESIUM SERPL-MCNC: 2.1 MG/DL (ref 1.8–2.4)
MCH RBC QN AUTO: 31.4 PG (ref 26.1–32.9)
MCHC RBC AUTO-ENTMCNC: 35.1 G/DL (ref 31.4–35)
MCV RBC AUTO: 89.7 FL (ref 82–102)
MONOCYTES # BLD: 0.7 K/UL (ref 0.1–1.3)
MONOCYTES NFR BLD: 10 % (ref 4–12)
NEUTS SEG # BLD: 3.2 K/UL (ref 1.7–8.2)
NEUTS SEG NFR BLD: 46 % (ref 43–78)
NRBC # BLD: 0 K/UL (ref 0–0.2)
PHOSPHATE SERPL-MCNC: 3.5 MG/DL (ref 2.5–4.5)
PLATELET # BLD AUTO: 303 K/UL (ref 150–450)
PMV BLD AUTO: 10.2 FL (ref 9.4–12.3)
POTASSIUM SERPL-SCNC: 3.8 MMOL/L (ref 3.5–5.1)
RBC # BLD AUTO: 4.93 M/UL (ref 4.23–5.6)
SODIUM SERPL-SCNC: 137 MMOL/L (ref 133–143)
TRIGL SERPL-MCNC: 107 MG/DL (ref 35–150)
WBC # BLD AUTO: 6.9 K/UL (ref 4.3–11.1)

## 2023-02-28 PROCEDURE — 6360000002 HC RX W HCPCS: Performed by: INTERNAL MEDICINE

## 2023-02-28 PROCEDURE — 6360000002 HC RX W HCPCS: Performed by: NURSE PRACTITIONER

## 2023-02-28 PROCEDURE — 84478 ASSAY OF TRIGLYCERIDES: CPT

## 2023-02-28 PROCEDURE — 99232 SBSQ HOSP IP/OBS MODERATE 35: CPT | Performed by: SURGERY

## 2023-02-28 PROCEDURE — 84100 ASSAY OF PHOSPHORUS: CPT

## 2023-02-28 PROCEDURE — 2580000003 HC RX 258: Performed by: SURGERY

## 2023-02-28 PROCEDURE — 83735 ASSAY OF MAGNESIUM: CPT

## 2023-02-28 PROCEDURE — 80048 BASIC METABOLIC PNL TOTAL CA: CPT

## 2023-02-28 PROCEDURE — 85025 COMPLETE CBC W/AUTO DIFF WBC: CPT

## 2023-02-28 PROCEDURE — 2580000003 HC RX 258: Performed by: INTERNAL MEDICINE

## 2023-02-28 PROCEDURE — A4216 STERILE WATER/SALINE, 10 ML: HCPCS | Performed by: INTERNAL MEDICINE

## 2023-02-28 PROCEDURE — 36415 COLL VENOUS BLD VENIPUNCTURE: CPT

## 2023-02-28 PROCEDURE — C9113 INJ PANTOPRAZOLE SODIUM, VIA: HCPCS | Performed by: INTERNAL MEDICINE

## 2023-02-28 RX ORDER — AMOXICILLIN AND CLAVULANATE POTASSIUM 875; 125 MG/1; MG/1
1 TABLET, FILM COATED ORAL 2 TIMES DAILY
Qty: 14 TABLET | Refills: 0 | Status: SHIPPED | OUTPATIENT
Start: 2023-02-28 | End: 2023-03-07

## 2023-02-28 RX ADMIN — PIPERACILLIN AND TAZOBACTAM 3375 MG: 3; .375 INJECTION, POWDER, LYOPHILIZED, FOR SOLUTION INTRAVENOUS at 04:26

## 2023-02-28 RX ADMIN — HYDROMORPHONE HYDROCHLORIDE 0.5 MG: 1 INJECTION, SOLUTION INTRAMUSCULAR; INTRAVENOUS; SUBCUTANEOUS at 00:35

## 2023-02-28 RX ADMIN — PANTOPRAZOLE SODIUM 40 MG: 40 INJECTION, POWDER, LYOPHILIZED, FOR SOLUTION INTRAVENOUS at 08:47

## 2023-02-28 RX ADMIN — SODIUM CHLORIDE, PRESERVATIVE FREE 10 ML: 5 INJECTION INTRAVENOUS at 08:51

## 2023-02-28 ASSESSMENT — PAIN DESCRIPTION - LOCATION: LOCATION: ABDOMEN;BACK

## 2023-02-28 ASSESSMENT — PAIN SCALES - GENERAL
PAINLEVEL_OUTOF10: 0
PAINLEVEL_OUTOF10: 6

## 2023-02-28 ASSESSMENT — PAIN - FUNCTIONAL ASSESSMENT: PAIN_FUNCTIONAL_ASSESSMENT: ACTIVITIES ARE NOT PREVENTED

## 2023-02-28 ASSESSMENT — PAIN DESCRIPTION - DESCRIPTORS: DESCRIPTORS: SORE;PRESSURE

## 2023-02-28 NOTE — PROGRESS NOTES
Comprehensive Nutrition Assessment    Type and Reason for Visit: Reassess  Malnutrition Screening Tool: Malnutrition Screen  Have you recently lost weight without trying?: 2 to 13 pounds (1 point)  Have you been eating poorly because of a decreased appetite?: Yes (1 point)  Malnutrition Screening Tool Score: 2  TPN Management (Hospitalist now signed off orders per Surgery as of 2/26)    Nutrition Recommendations/Plan:   Parenteral Nutrition:  Infusing at 40 ml/hr at RD visit, D/C after 1 hour at current rate. Diet at D/C per MD:   \"Diet  Soups, Juice, Liquids, and Yogurts for 2 days   Then Soft diet until follow-up\"  GI soft diet education complete with pt. Malnutrition Assessment:  Malnutrition Status: At risk for malnutrition (Comment) (weight loss, variable po over last 2 months)    No kimberly wasting  Nutrition Assessment:  Nutrition History: Patient reports that prior to Feb 1st he has been losing weight and not eating well. He reports usual weight of 210 lbs and got down to about 183#s before the procedure 2/1/23. He says following the procedure we started to eat again very well with no issues and gained weight back to at least 190 #s. He reports he than had procedure on 2/16 following that he has been getting severe abdominal pain that is worse when eating/drinking. He reports able to eat breakfast Friday morning with no N/V but significant pain. Do You Have Any Cultural, Alevism, or Ethnic Food Preferences?: No   Nutrition Background:   Wound Type: None   PMH only significant for GERD. Recent GI complaints followed by GI s/p ERCP with biliary stent on 2-1-23, EUS w/ FNA 2-16-23 who presented due to abdominal pain. Elevated lipase on admission. 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 Surgery consulted but no surgery for now awaiting resolution of pancreatitis. Nutrition Interval:  2/21: Double lumen PICC, PPN started without lipids.   2/22: advanced to TPN with lipids x 3/week. Diet advanced to clears 2/27. TPN to d/c 2/28 for D/C. Pt reports + tolerance of liquid diet, GI soft education completed. Provided 2 servings of ensure enlive for 2 days liquids at home. TPN decreased prior to RD visit per phone discussion with Jemma Barone RN. Abdominal Status (last documented):   Last BM (including prior to admit): 02/27/23, GI Symptoms: Flatus   Pertinent Medications: Protonix, Zosyn  Continuous: none  Electrolyte Replacement:  electrolyte protocol active  PRN: Zofran (no admin), gastrografin 2/27  Pertinent Labs:   Lab Results   Component Value Date/Time     02/28/2023 07:14 AM    K 3.8 02/28/2023 07:14 AM     02/28/2023 07:14 AM    CO2 22 02/28/2023 07:14 AM    BUN 16 02/28/2023 07:14 AM    CREATININE 0.80 02/28/2023 07:14 AM    GLUCOSE 132 02/28/2023 07:14 AM    CALCIUM 9.4 02/28/2023 07:14 AM    PHOS 3.5 02/28/2023 07:14 AM    MG 2.1 02/28/2023 07:14 AM      Lab Results   Component Value Date/Time    TRIG 107 02/28/2023 07:14 AM    TRIG 184 02/23/2023 04:50 AM    TRIG 100 02/22/2023 04:55 AM   Bili 1.1.  (2/21)  Labs remain relatively stable, mildly elevated glucose. TG remain WNL s/p lipid infusion last pm.      Current Nutrition Therapies:  PN-Adult Premix 5/15 - Central  ADULT DIET;  Clear Liquid  Current Parenteral Nutrition Orders:  Type and Formula: Premix Central (Dex 15%, 5% AA)   Lipids: 250ml, Three times weekly  Duration: Continuous  Rate/Volume: 2 L (85ml/hr)  Current PN Order Provides: 1634 kcal average/d (93% of needs), 100 grams of protein/d (100% of needs), 300 grams of CHO/d and 2147 ml average total volume/d    Current Intake:   Average Meal Intake: % (clears) Average Supplements Intake: None Ordered      Anthropometric Measures:  Height: 6' (182.9 cm)  Current Body Wt: 182 lb 1.6 oz (82.6 kg) (2/28), Weight source: Not Specified  BMI: 24.7, Normal Weight (BMI 22.0 to 24.9) age over 72     Ideal Body Weight (Kg) (Calculated): 81 kg (178 lbs), 109.4 %  Usual Body Wt: 210 lb (95.3 kg), Percent weight change: -7.3       BMI Category Normal Weight (BMI 22.0 to 24.9) age over 72  Estimated Daily Nutrient Needs:  Energy (kcal/day): 7685-4827 (20-25 kcal/kg) (Kcal/kg Weight used: 88.3 kg Current (2/19)  Protein (g/day):  (1-1.2 g/kg) Weight Used: (Current) 88.3 kg  Fluid (ml/day):   (1 ml/kcal)    Nutrition Diagnosis:   Inadequate oral intake related to altered GI function as evidenced by NPO or clear liquid status due to medical condition, NPO status  Nutrition Interventions:   Food and/or Nutrient Delivery: Discontinue Parenteral Nutrition, Continue Current Diet  Nutrition Education/Counseling: Education completed  Coordination of Nutrition Care: Continue to monitor while inpatient       Goals:   Previous Goal Met: Goal(s) Achieved (Prior goal discontinued)  Active Goal:  (Tolerate diet progression by next RD visit)       Nutrition Monitoring and Evaluation:      Food/Nutrient Intake Outcomes: Diet Advancement/Tolerance, Food and Nutrient Intake  Physical Signs/Symptoms Outcomes: Biochemical Data, GI Status, Fluid Status or Edema, Meal Time Behavior, Weight    Discharge Planning:     (Diet per MD)    Joey Kemp, [address]

## 2023-02-28 NOTE — DISCHARGE SUMMARY
[facility], [address]  [phone]   Discharge Summary     Sascha Lima MRN: [id number]     : 1965     Age: 61 y.o. Admit date: 2023     Discharge date: 2023  Attending Physician: No att. providers found  Primary Discharge Diagnosis:   Principal Problem:    Post-ERCP acute pancreatitis  Active Problems:    Biliary stricture    Abdominal pain    Hyponatremia    Tobacco use    Pancreatitis    Acute cholecystitis  Resolved Problems:    Sepsis [facility])    Primary Operations or Procedures Performed :  Procedure(s):      Brief History and Reason for Admission: Sascha Lima was admitted with the following history of present illness. Sascha Lima is a 61 y.o. male who was admitted by the hospitalist on 23 after he presented with epigastric pain. He reported severe nausea beginning in 2023 with p.o. intake  This was associated with upper abdominal discomfort and dark urine  He was prescribed Protonix by his primary care at first which did not help  He then had US below and was referred [facility] GI     [facility]  Liver: Intrahepatic biliary duct dilatation is present. No focal liver lesions visualized. Gallbladder: The gallbladder is distended and contains sludge. No pericholecystic fluid. No sonographic Pop sign. Common bile duct: Dilated to 10 mm. Pancreas: The body the pancreas is seen and there is pancreatic duct dilatation to 4 mm in the body the pancreas. The head and tail the pancreas are not adequately visualized by ultrasound. Right kidney: Normal.     IMPRESSION:     Common duct dilatation, intrahepatic biliary duct dilatation, and gallbladder distention likely represents biliary obstruction. Superimposed pancreatic duct dilatation is also present. This constellation of findings can be seen in the setting of a pancreatic head mass. The head of the pancreas is not adequately seen on today's exam.   I would recommend pancreatic mass protocol CT and gastroenterology consultation. 2/1/23 ERCP with stent; Dr Justin July:  Biliary stricture just proximal to ampulla. Could visualize this area post papillotomy. Sludge and cloudy bile extracted. Able to pass 12 mm balloon with resistance. Good drainage with stent. Pancreas- not able to cannulate. GB- ? Sludge vs small stones  Biggest concern would a small pancreatic head tumor    2/16/23 EUS; Dr Ann-Marie Maya:   1. Extensive pancreatic parenchymal abnormalities are consistent with chronic pancreatitis based on EUS criteria. It should be noted that the presence of chronic pancreatitis decreases the sensitivity of EUS for detection of pancreatic tumors. 2. 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 FNA was performed. 3. Pancreatic cyst. Morphologic features are most typical for pseudocyst. This is currently too small to warrant FNA. Soon after the EUS that he developed worsening abdominal pain and came to the ER. He denies prior abdominal surgery. He is not taking blood thinners. In ER on 2/19/23 wbc 15.8k, hgb 16.7, plt 306k  T bili 1.4, AST/ALT 12/42, alk [id number]  Lipase 1088  Gen Surgery was consulted after the below CT   2/18/23 CT abd/pelvis with IV contrast  Hx: upper abd pain s/p ERCP     Lower Chest: Scattered atelectasis in the lung bases. Liver: Normal.     Gallbladder/Billary: The gallbladder is inflamed. 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 A biliary stent is in good position. Pancreas: Normal.     Spleen: Normal.     Adrenal Glands: Normal.     Kidneys: Normal.     GI Tract: The stomach and duodenum are unremarkable. Normal appendix. Normal small bowel. Mesentery/Peritoneum: Hazy edema and inflammation in the upper central mesentery.  No free intraperitoneal air. Vasculature: Normal.     Lymph Nodes: Normal.     Abdominal Wall: See musculoskeletal section. Bladder: Normal.     Reproductive: Normal.     Musculoskeletal: Normal.      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 2. Biliary stent in good position      Hospital Course:    2/21/23 feels much better; still with some epigastric pain; ambulating in room  2/23/23 abd pain improved; AF; TPN/NPO;  IV Zosyn for cholecysto-colic fistula on CT  9/37/21 feels better; AF; WBC 6.3k; On TPN for pancreatitis; repeat CT Monday 2/25/23 Feels OK; AF; ON TPN for pancreatitis and cholecysto-colic fistula suspected on initial; CT; Repeat CT Monday am ordered; OR Monday afternoon if needed  2/26/23 some mild LUQ pain this am;  AF/VSS; WBC normal; repeat CT in am; possible surgery Monday afternoon 2/27/23 comfortable, no anorectal pain (see CT);  AF/VSS; WBC normal ; Repeat CT this am with oral and IV contrast shows improvement; No surgery today  2/28/23 no pain; feels well; tolerating liquid diet; Home today on Augmentin   Repeat CT on 2/27/23 makes cholecysto-colic fistula less likely as there is no longer any gas in GB  The initial gas in the GB may have been from the ERCP for stent placement  On CT there is also lots of oral contrast in proximal transverse colon adjacent to GB with no extravasation of contrast into GB. IV Zosyn while inpt  Resolving acute pancreatitis and peripancreatic edema    2/27/23 CT abd/pelvis with oral and IV contrast  FINDINGS: -Lung Bases: The lung bases are clear.     -Liver: Appears uniform. -Gallbladder: Gallbladder wall appears thickened. The gallbladder gas has resolved. -Bile Ducts: Not dilated. Biliary stent is present.     -Pancreas: Enhances uniformly. Pancreatic stranding densities a small amount of fluid remain.   -Spleen: Uniform and normal size.     -Stomach: Unremarkable. -Bowel: Normal caliber.  Long normal appendix with tip in the inferior margin of the liver   Some fatty stratification of the terminal ileum   Small soft tissue density and bubble of gas is seen in the right ischial rectal fossa suggesting an infralevator ani perianal fistula. -Adrenals: Are normal size.   -Kidneys/Ureters: Enhance symmetrically. No hydronephrosis. -Urinary Bladder: Unremarkable. -Reproductive Organs: Prostate calcifications     -Lymph Nodes: No grossly enlarged retroperitoneal, mesenteric, or pelvic adenopathy.   -Vasculature: Aorta is normal caliber. Retroaortic left renal vein, normal variant.   -Bones: No gross bony lesions.     -Other: No ascites. Impression:  1) Mild gallbladder wall thickening although the gas within the lumen has resolved. 2) Small soft tissue density and bubble of gas is seen in the right ischial rectal fossa suggesting an infralevator ani perianal fistula. 3) Fatty stratification of the terminal ileum suggesting chronic inflammation. Regional enteritis should be considered. The patient progressed satisfactorily meeting milestones necessary for successful discharge including tolerating a diet, adequate mobility, adequate pain control, and active bowel function. Patient was deemed a good candidate for discharge at the time of morning rounding. They are to follow up as indicated in their provided discharge paperwork. The patient helped develop and voices understanding with the plan of care. They are amenable without reservations at this time to moving forward with discharge.      Condition at Discharge: Good    Discharge Medications:   Discharge Medication List as of 2/28/2023 11:14 AM        START taking these medications    Details   amoxicillin-clavulanate (AUGMENTIN) 875-125 MG per tablet Take 1 tablet by mouth 2 times daily for 7 days, Disp-14 tablet, R-0Normal           CONTINUE these medications which have NOT CHANGED    Details   oxyCODONE 5 MG capsule Take 5 mg by mouth every 4 hours as needed for Pain.Historical Med      acetaminophen (TYLENOL) 500 MG tablet Take 500 mg by mouth every 6 hours as needed for PainHistorical Med      pantoprazole (PROTONIX) 20 MG tablet Take 20 mg by mouth 2 times dailyHistorical Med      sucralfate (CARAFATE) 1 GM tablet Take 1 g by mouth 3 times dailyHistorical Med      ondansetron (ZOFRAN-ODT) 4 MG disintegrating tablet Take 4 mg by mouth 3 times daily as neededHistorical Med      hydrocortisone (ANUSOL-HC) 25 MG suppository Place 25 mg rectally 2 times daily as neededHistorical Med               Disposition: home     Discharge Instructions/Follow-up Care: Activity  No driving until you are off pain meds for 24hrs and have no pain with movements associated with driving.     Take Augmentin (antibiotic) for 2 weeks - sent to your pharmacy    Follow-up with Dr Mignon Gifford in approx 2 weeks (or sooner if needed) in the office at:  [address], [address]  (Call for an appt time unless one is already made for you by discharge nurse which is preferred->[phone])    Diet  Soups, Juice, Liquids, and Yogurts for 2 days   Then Soft diet until follow-up     Signed:  IFRAH Lou NP   2/28/2023  2:38 PM

## 2023-02-28 NOTE — CARE COORDINATION
Patient is scheduled for d/c today 2.28.2023 - no needs voiced. Patient will follow up outpatient for lorena casper. Patient is agreeable to d/c plan - home independently with spouse. Medical milestones met. CM will follow until d/c is complete. 02/28/23 0911   Service Assessment   Patient Orientation Alert and Oriented   Cognition Alert   History Provided By Patient   Primary Caregiver Self   Support Systems Spouse/Significant Other   Patient's Healthcare Decision Maker is: Legal Next of Kin   PCP Verified by CM Yes   Last Visit to PCP Within last 6 months   Prior Functional Level Independent in ADLs/IADLs   Current Functional Level Independent in ADLs/IADLs   Can patient return to prior living arrangement Yes   Ability to make needs known: Good   Family able to assist with home care needs: Yes   Would you like for me to discuss the discharge plan with any other family members/significant others, and if so, who? No   Social/Functional History   Lives With Spouse   Type of Home House   ADL Assistance Independent   Discharge Planning   Type of [facility] Spouse/Significant Other   Patient expects to be discharged to: . [address] Discharge   Mode of Transport at Discharge Self   Confirm Follow Up Transport Family   Condition of Participation: Discharge Planning   The Patient and/or Patient Representative was provided with a Choice of Provider? Patient   The Patient and/Or Patient Representative agree with the Discharge Plan? Yes   Freedom of Choice list was provided with basic dialogue that supports the patient's individualized plan of care/goals, treatment preferences, and shares the quality data associated with the providers?   Yes

## 2023-02-28 NOTE — PROGRESS NOTES
H&P/Consult Note/Progress Note/Office Note:   [place]. MRN: [id number]  :1965  Age:57 y.o.    HPI: Krystyna Hargrove is a 61 y.o. male who was admitted by the hospitalist on 23 after he presented with epigastric pain. He reported severe nausea beginning in 2023 with p.o. intake  This was associated with upper abdominal discomfort and dark urine  He was prescribed Protonix by his primary care at first which did not help  He then had US below and was referred to GI    [facility]  Liver: Intrahepatic biliary duct dilatation is present. No focal liver lesions visualized. Gallbladder: The gallbladder is distended and contains sludge. No pericholecystic fluid. No sonographic Pop sign. Common bile duct: Dilated to 10 mm. Pancreas: The body the pancreas is seen and there is pancreatic duct dilatation to 4 mm in the body the pancreas. The head and tail the pancreas are not adequately visualized by ultrasound. Right kidney: Normal.     IMPRESSION:     Common duct dilatation, intrahepatic biliary duct dilatation, and gallbladder distention likely represents biliary obstruction. Superimposed pancreatic duct dilatation is also present. This constellation of findings can be seen in the setting of a pancreatic head mass. The head of the pancreas is not adequately seen on today's exam.   I would recommend pancreatic mass protocol CT and gastroenterology consultation. 23 ERCP with stent; Dr Claudia Webster:  Biliary stricture just proximal to ampulla. Could visualize this area post papillotomy. Sludge and cloudy bile extracted. Able to pass 12 mm balloon with resistance. Good drainage with stent. Pancreas- not able to cannulate. GB- ? Sludge vs small stones  Biggest concern would a small pancreatic head tumor          23 EUS; Dr Rosales :   1.  Extensive pancreatic parenchymal abnormalities are consistent with chronic pancreatitis based on EUS criteria. It should be noted that the presence of chronic pancreatitis decreases the sensitivity of EUS for detection of pancreatic tumors. 2. 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 FNA was performed. 3. Pancreatic cyst. Morphologic features are most typical for pseudocyst. This is currently too small to warrant FNA. Soon after the EUS that he developed worsening abdominal pain and came to the ER. He denies prior abdominal surgery. He is not taking blood thinners. In ER on 2/19/23 wbc 15.8k, hgb 16.7, plt 306k  T bili 1.4, AST/ALT 12/42, alk [id number]  Lipase 1088  Gen Surgery was consulted after the below CT       2/18/23 CT abd/pelvis with IV contrast    Hx: upper abd pain s/p ERCP     Lower Chest: Scattered atelectasis in the lung bases. Liver: Normal.     Gallbladder/Billary: The gallbladder is inflamed. 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 A biliary stent is in good position. Pancreas: Normal.     Spleen: Normal.     Adrenal Glands: Normal.     Kidneys: Normal.     GI Tract: The stomach and duodenum are unremarkable. Normal appendix. Normal small bowel. Mesentery/Peritoneum: Hazy edema and inflammation in the upper central mesentery. No free intraperitoneal air. Vasculature: Normal.     Lymph Nodes: Normal.     Abdominal Wall: See musculoskeletal section. Bladder: Normal.     Reproductive: Normal.     Musculoskeletal: Normal.     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 2. Biliary stent in good position         2/27/23 CT abd/pelvis with oral and IV contrast      FINDINGS: -Lung Bases: The lung bases are clear.     -Liver: Appears uniform. -Gallbladder: Gallbladder wall appears thickened. The gallbladder gas has resolved. -Bile Ducts: Not dilated. Biliary stent is present.     -Pancreas: Enhances uniformly.  Pancreatic stranding densities a small amount of fluid remain.   -Spleen: Uniform and normal size.     -Stomach: Unremarkable. -Bowel: Normal caliber. Long normal appendix with tip in the inferior margin of the liver   Some fatty stratification of the terminal ileum   Small soft tissue density and bubble of gas is seen in the right ischial rectal fossa suggesting an infralevator ani perianal fistula. -Adrenals: Are normal size.   -Kidneys/Ureters: Enhance symmetrically. No hydronephrosis. -Urinary Bladder: Unremarkable. -Reproductive Organs: Prostate calcifications     -Lymph Nodes: No grossly enlarged retroperitoneal, mesenteric, or pelvic adenopathy.   -Vasculature: Aorta is normal caliber. Retroaortic left renal vein, normal variant.   -Bones: No gross bony lesions.     -Other: No ascites. Impression:  1) Mild gallbladder wall thickening although the gas within the lumen has resolved. 2) Small soft tissue density and bubble of gas is seen in the right ischial rectal fossa suggesting an infralevator ani perianal fistula. 3) Fatty stratification of the terminal ileum suggesting chronic inflammation. Regional enteritis should be considered. Additional hx:  2/21/23 feels much better; still with some epigastric pain; ambulating in room  2/23/23 abd pain improved; AF; TPN/NPO;  IV Zosyn for cholecysto-colic fistula on CT  7/68/32 feels better; AF; WBC 6.3k; On TPN for pancreatitis; repeat CT Monday 2/25/23 Feels OK; AF; ON TPN for pancreatitis and cholecysto-colic fistula suspected on initial; CT; Repeat CT Monday am ordered; OR Monday afternoon if needed  2/26/23 some mild LUQ pain this am;  AF/VSS; WBC normal; repeat CT in am; possible surgery Monday afternoon 2/27/23 comfortable, no anorectal pain (see CT);  AF/VSS; WBC normal ; Repeat CT this am with oral and IV contrast shows improvement;  No surgery today  2/28/23 no pain; feels well; tolerating liquid diet; Home today on Augmentin          Past Medical History:   Diagnosis Date    GERD (gastroesophageal reflux disease)     medication    Sleep apnea     has a CPAP but does not use     Past Surgical History:   Procedure Laterality Date    COLONOSCOPY      ERCP N/A 2/1/2023    ERCP SPHINCTER/PAPILLOTOMY/BALLOON SWEEP/STENT PLACEMENT performed by Terrance Cedillo MD at [facility] ENDOSCOPY    ERCP W/ PLASTIC STENT PLACEMENT  02/01/2023    SINUS SURGERY      x3    VASECTOMY       Current Facility-Administered Medications   Medication Dose Route Frequency    PN-Adult Premix 5/15 - Central   IntraVENous Continuous TPN    melatonin tablet 6 mg  6 mg Oral Nightly PRN    fat emulsion 20 % infusion 250 mL  250 mL IntraVENous Once per day on Mon Wed Fri    potassium chloride 20 mEq/50 mL IVPB (Central Line)  20 mEq IntraVENous PRN    Or    potassium chloride 10 mEq/100 mL IVPB (Peripheral Line)  10 mEq IntraVENous PRN    magnesium sulfate 2000 mg in 50 mL IVPB premix  2,000 mg IntraVENous PRN    sodium phosphate 10 mmol in sodium chloride 0.9 % 250 mL IVPB  10 mmol IntraVENous PRN    Or    sodium phosphate 15 mmol in sodium chloride 0.9 % 250 mL IVPB  15 mmol IntraVENous PRN    Or    sodium phosphate 20 mmol in sodium chloride 0.9 % 500 mL IVPB  20 mmol IntraVENous PRN    diphenhydrAMINE (BENADRYL) injection 25 mg  25 mg IntraVENous Nightly PRN    sodium chloride flush 0.9 % injection 5-40 mL  5-40 mL IntraVENous 2 times per day    HYDROmorphone (DILAUDID) injection 0.5 mg  0.5 mg IntraVENous Q4H PRN    ondansetron (ZOFRAN-ODT) disintegrating tablet 4 mg  4 mg Oral Q8H PRN    Or    ondansetron (ZOFRAN) injection 4 mg  4 mg IntraVENous Q6H PRN    acetaminophen (TYLENOL) tablet 650 mg  650 mg Oral Q6H PRN    piperacillin-tazobactam (ZOSYN) 3,375 mg in sodium chloride 0.9 % 50 mL IVPB (mini-bag)  3,375 mg IntraVENous q8h    pantoprazole (PROTONIX) 40 mg in sodium chloride (PF) 0.9 % 10 mL injection  40 mg IntraVENous Daily     ALLERGIES:  Patient has no known allergies. Social History     Socioeconomic History    Marital status:    Tobacco Use    Smoking status: Every Day     Packs/day: 0.50     Years: 40.00     Pack years: 20.00     Types: Cigarettes    Smokeless tobacco: Never   Vaping Use    Vaping Use: Never used   Substance and Sexual Activity    Alcohol use: Not Currently    Drug use: Not Currently     Social History     Tobacco Use   Smoking Status Every Day    Packs/day: 0.50    Years: 40.00    Pack years: 20.00    Types: Cigarettes   Smokeless Tobacco Never     No family history on file. ROS: The patient has no difficulty with chest pain or shortness of breath. No fever or chills. Comprehensive review of systems was otherwise unremarkable except as noted above. Physical Exam:   /69   Pulse 66   Temp 97.3 °F (36.3 °C) (Oral)   Resp 18   Ht 6' (1.829 m)   Wt 182 lb (82.6 kg)   SpO2 98%   BMI 24.68 kg/m²   Vitals:    02/28/23 0035 02/28/23 0105 02/28/23 0426 02/28/23 0638   BP:   109/69    Pulse:   66    Resp: 16 16 18    Temp:   97.3 °F (36.3 °C)    TempSrc:   Oral    SpO2:   98%    Weight:    182 lb (82.6 kg)   Height:         02/27 1901 - 02/28 0700  In: 1165.5 [I.V.:1165.5]  Out: 800 [Urine:800]  02/26 0701 - 02/27 1900  In: 5402.8 [P.O.:50; I.V.:5352.8]  Out: 1900 [Urine:1900]    Constitutional: Alert, oriented, cooperative patient in no acute distress; appears stated age    Eyes:Sclera are clear. EOMs intact  ENMT: no external lesions gross hearing normal; no obvious neck masses, no ear or lip lesions, nares normal  CV: RRR. Normal perfusion  Resp: No JVD. Breathing is  non-labored; no audible wheezing. GI: soft and non-distended, no upper abd pain       Musculoskeletal: unremarkable with normal function. No embolic signs or cyanosis.    Neuro:  Oriented; moves all 4; no focal deficits  Psychiatric: normal affect and mood, no memory impairment    Recent vitals (if inpt):  Patient Vitals for the past 24 hrs:   BP Temp Temp src Pulse Resp SpO2 Height Weight   02/28/23 0638 -- -- -- -- -- -- -- 182 lb (82.6 kg)   02/28/23 0426 109/69 97.3 °F (36.3 °C) Oral 66 18 98 % -- --   02/28/23 0105 -- -- -- -- 16 -- -- --   02/28/23 0035 -- -- -- -- 16 -- -- --   02/27/23 2313 100/69 97.5 °F (36.4 °C) Oral 65 18 97 % -- --   02/27/23 1950 -- -- -- 66 18 -- -- --   02/27/23 1922 119/74 97.5 °F (36.4 °C) Oral 64 16 96 % -- --   02/27/23 1503 102/74 97.7 °F (36.5 °C) Oral 59 16 96 % -- --   02/27/23 1124 109/61 98 °F (36.7 °C) Oral 63 16 96 % 6' (1.829 m) 180 lb (81.6 kg)   02/27/23 0741 105/69 98.2 °F (36.8 °C) Oral 61 16 98 % -- --       Amount and/or Complexity of Data Reviewed and Analyzed:  I reviewed and analyzed all of the unique labs and radiologic studies that are shown below as well as any that are in the HPI, and any that are in the expanded problem list below  *Each unique test, order, or document contributes to the combination of 2 or combination of 3 in Category 1 below. For this visit I also reviewed old records and prior notes.       Recent Labs     02/27/23  0554   WBC 6.2   HGB 15.3         K 3.9      CO2 23   BUN 19   INR 1.1   ALT 44     Review of most recent CBC  Lab Results   Component Value Date    WBC 6.2 02/27/2023    HGB 15.3 02/27/2023    HCT 44.9 02/27/2023    MCV 90.0 02/27/2023     02/27/2023       Review of most recent BMP  Lab Results   Component Value Date/Time     02/27/2023 05:54 AM    K 3.9 02/27/2023 05:54 AM     02/27/2023 05:54 AM    CO2 23 02/27/2023 05:54 AM    BUN 19 02/27/2023 05:54 AM    CREATININE 0.80 02/27/2023 05:54 AM    GLUCOSE 122 02/27/2023 05:54 AM    CALCIUM 9.3 02/27/2023 05:54 AM        Review of most recent LFTs (and lipase if done)  Lab Results   Component Value Date    ALT 44 02/27/2023    AST 15 02/27/2023    ALKPHOS 85 02/27/2023    BILITOT 0.6 02/27/2023     Lab Results   Component Value Date    LIPASE 130 02/21/2023       Lab Results Component Value Date/Time    INR 1.1 02/27/2023 05:54 AM       Review of most recent HgbA1c  No results found for: LABA1C    Nutritional assessment screen for wound healing issues:  Lab Results   Component Value Date    LABALBU 3.1 (L) 02/27/2023       @lastcovr@    Xray Result (most recent):  No results found for this or any previous visit from the past 3650 days. CT Result (most recent):  CT ABDOMEN PELVIS W IV CONTRAST 02/27/2023    Narrative  CT ABDOMEN AND PELVIS WITH CONTRAST. INDICATION: Pancreatitis and colonic fistula. COMPARISON: 9 days prior, 18 and February    TECHNIQUE: 5 mm axial scans from above the diaphragms to the pubic symphysis  following oral and 100 cc intravenous contrast without acute complication. Intravenous contrast was given to increase the sensitivity to acute  inflammation. Radiation dose reduction techniques were used for this study. Our CT scanners use one or more of the following: Automated exposure control,  adjustment of the mA and or kV according to patient size, iterative  reconstruction. FINDINGS:  -Lung Bases: The lung bases are clear.    -Liver: Appears uniform. -Gallbladder: Gallbladder wall appears thickened. The gallbladder gas has  resolved. -Bile Ducts: Not dilated. Biliary stent is present.    -Pancreas: Enhances uniformly. Pancreatic stranding densities a small amount of  fluid remain.  -Spleen: Uniform and normal size.    -Stomach: Unremarkable. -Bowel:  Normal caliber. Long normal appendix with tip in the inferior margin of the liver  Some fatty stratification of the terminal ileum  Small soft tissue density and bubble of gas is seen in the right ischial rectal  fossa suggesting an infralevator ani perianal fistula. -Adrenals: Are normal size.  -Kidneys/Ureters: Enhance symmetrically. No hydronephrosis. -Urinary Bladder: Unremarkable.   -Reproductive Organs: Prostate calcifications    -Lymph Nodes: No grossly enlarged retroperitoneal, mesenteric, or pelvic  adenopathy.  -Vasculature: Aorta is normal caliber. Retroaortic left renal vein, normal  variant.  -Bones: No gross bony lesions.    -Other: No ascites. Impression  1) Mild gallbladder wall thickening although the gas within the lumen has  resolved. 2) Small soft tissue density and bubble of gas is seen in the right ischial  rectal fossa suggesting an infralevator ani perianal fistula. 3) Fatty stratification of the terminal ileum suggesting chronic inflammation. Regional enteritis should be considered. US Result (most recent):  No results found for this or any previous visit from the past 3650 days. Admission date (for inpatients): 2/18/2023   1 Day Post-Op  * No surgery found *        ASSESSMENT/PLAN:  [unfilled]  Principal Problem:    Post-ERCP acute pancreatitis  Active Problems:    Biliary stricture    Abdominal pain    Hyponatremia    Tobacco use    Pancreatitis    Acute cholecystitis  Resolved Problems:    Sepsis [facility])     Patient Active Problem List    Diagnosis Date Noted    Post-ERCP acute pancreatitis 02/22/2023     Priority: Medium    Biliary stricture 02/18/2023     Priority: Medium    Abdominal pain 02/18/2023     Priority: Medium    Hyponatremia 02/18/2023     Priority: Medium    Tobacco use 02/18/2023     Priority: Medium    Pancreatitis 02/18/2023     Priority: Medium    Acute cholecystitis 02/18/2023     Priority: Medium     Added automatically from request for surgery [id number]            Number and Complexity of Problems addressed and   Risks of complications and/or morbidity of management              Abnormal GB on CT  Repeat CT on 2/27/23 makes cholecysto-colic fistula less likely as there is no longer any gas in GB  The initial gas in the GB may have been from the ERCP for stent placement  On CT there is also lots of oral contrast in proximal transverse colon adjacent to GB with no extravasation of contrast into GB.   IV Zosyn while inpt  Resolving acute pancreatitis and peripancreatic edema      He can be discharged today on a liquid diet  I wrote diet instructions in his dc instructions  I sent Augmentin Rx to his pharmacy for use for 2 weeks     I think if we hold off on surgery longer there will be a greater chance of being able to perform the cholecystectomy laparoscopically    Will scheduled lap pennie electively as outpt        Acute Pancreatitis after EUS and FNA of pancreas  Improved pancreatic edema on CT 2/27/28  TPN via PICC   Morphine ordered for pain  FNA biopsy showed no evidence of malignancy  Etiology of distal CBD stricture most likely multifactorial recurrent pancreatitis (alcohol, gallstones/CBD stone)            Level of MDM (2/3 elements below)  Number and Complexity of Problems Addressed Amount and/or Complexity of Data to be Reviewed and Analyzed  *Each unique test, order, or document contributes to the combination of 2 or combination of 3 in Category 1 below. Risk of Complications and/or Morbidity or Mortality of pt Management     54430  35436 SF Minimal  ?1self-limited or minor problem Minimal or none Minimal risk of morbidity from additional diagnostic testing or Rx   93509  05828 Low Low  ? 2or more self-limited or minor problems;    or  ? 1stable chronic illness;    or  ?1acute, uncomplicated illness or injury   Limited  (Must meet the requirements of at least 1 of the 2 categories)  Category 1: Tests and documents   ? Any combination of 2 from the following:  ?Review of prior external note(s) from each unique source*;  ?review of the result(s) of each unique test*;   ?ordering of each unique test*    or   Category 2: Assessment requiring an independent historian(s)  (For the categories of independent interpretation of tests and discussion of management or test interpretation, see moderate or high) Low risk of morbidity from additional diagnostic testing or treatment     02441  39315 Mod Moderate  ? 1or more chronic illnesses with exacerbation, progression, or side effects of treatment;    or  ?2or more stable chronic illnesses;    or  ?1undiagnosed new problem with uncertain prognosis;    or  ?1acute illness with systemic symptoms;    or  ?1acute complicated injury   Moderate  (Must meet the requirements of at least 1 out of 3 categories)  Category 1: Tests, documents, or independent historian(s)  ? Any combination of 3 from the following:   ?Review of prior external note(s) from each unique source*;  ?Review of the result(s) of each unique test*;  ?Ordering of each unique test*;  ?Assessment requiring an independent historian(s)    or  Category 2: Independent interpretation of tests   ? Independent interpretation of a test performed by another physician/other qualified health care professional (not separately reported);     or  Category 3: Discussion of management or test interpretation  ? Discussion of management or test interpretation with external physician/other qualified health care professional/appropriate source (not separately reported)   Moderate risk of morbidity from additional diagnostic testing or treatment  Examples only:  ?Prescription drug management   ? Decision regarding minor surgery with identified patient or procedure risk factors  ? Decision regarding elective major surgery without identified patient or procedure risk factors   ? Diagnosis or treatment significantly limited by social determinants of health       95147  26208 High High  ? 1or more chronic illnesses with severe exacerbation, progression, or side effects of treatment;    or  ?1 acute or chronic illness or injury that poses a threat to life or bodily function   Extensive  (Must meet the requirements of at least 2 out of 3 categories)  Category 1: Tests, documents, or independent historian(s)  ? Any combination of 3 from the following:   ?Review of prior external note(s) from each unique source*;  ?Review of the result(s) of each unique test*;   ?Ordering of each unique test*;   ?Assessment requiring an independent historian(s)    or   Category 2: Independent interpretation of tests   ? Independent interpretation of a test performed by another physician/other qualified health care professional (not separately reported);     or  Category 3: Discussion of management or test interpretation  ? Discussion of management or test interpretation with external physician/other qualified health care professional/appropriate source (not separately reported)   High risk of morbidity from additional diagnostic testing or treatment  Examples only:  ?Drug therapy requiring intensive monitoring for toxicity  ? Decision regarding elective major surgery with identified patient or procedure risk factors  ? Decision regarding emergency major surgery  ? Decision regarding hospitalization  ? Decision not to resuscitate or to de-escalate care because of poor prognosis      Parenteral controlled substances             I have personally performed a face-to-face diagnostic evaluation and management  service on this patient. I have independently seen the patient. I have independently obtained the above history from the patient/family. I have independently examined the patient with above findings. I have independently reviewed data/labs for this patient and developed the above plan of care (MDM).       Signed: Geraldine Thapa MD  2/28/2023    6:42 AM

## 2023-02-28 NOTE — PLAN OF CARE
Problem: Discharge Planning  Goal: Discharge to home or other facility with appropriate resources  Recent Flowsheet Documentation  Taken 2/28/2023 0800 by Rosi Cummings. Mike Alexander, RN  Discharge to home or other facility with appropriate resources: Identify barriers to discharge with patient and caregiver     Problem: Safety - Adult  Goal: Free from fall injury  Recent Flowsheet Documentation  Taken 2/28/2023 1016 by Rosi Cummings.  Mike Alexander, RN  Free From Fall Injury: Instruct family/caregiver on patient safety

## 2023-02-28 NOTE — PROGRESS NOTES
Discharge instructions reviewed with patient. R PICC line removed. Prescriptions sent to patient's pharmacy. All questions answered. Family to transport home.

## 2023-02-28 NOTE — DISCHARGE INSTRUCTIONS
Activity  No driving until you are off pain meds for 24hrs and have no pain with movements associated with driving.     Take Augmentin (antibiotic) for 2 weeks - sent to your pharmacy    Follow-up with Dr Mishel Foley in approx 2 weeks (or sooner if needed) in the office at:  [address], [address]  (Call for an appt time unless one is already made for you by discharge nurse which is preferred->[phone])    Diet  Soups, Juice, Liquids, and Yogurts for 2 days   Then Soft diet until follow-up

## 2023-03-03 RX ORDER — AMOXICILLIN AND CLAVULANATE POTASSIUM 875; 125 MG/1; MG/1
1 TABLET, FILM COATED ORAL 2 TIMES DAILY
Qty: 14 TABLET | Refills: 0 | Status: SHIPPED | OUTPATIENT
Start: 2023-03-03 | End: 2023-03-10

## 2023-03-15 ENCOUNTER — OFFICE VISIT (OUTPATIENT)
Dept: SURGERY | Age: 58
End: 2023-03-15
Payer: COMMERCIAL

## 2023-03-15 VITALS — BODY MASS INDEX: 24.65 KG/M2 | HEIGHT: 72 IN | WEIGHT: 182 LBS

## 2023-03-15 DIAGNOSIS — K85.90 ACUTE PANCREATITIS, UNSPECIFIED COMPLICATION STATUS, UNSPECIFIED PANCREATITIS TYPE: ICD-10-CM

## 2023-03-15 DIAGNOSIS — K83.1 BILIARY STRICTURE: Chronic | ICD-10-CM

## 2023-03-15 DIAGNOSIS — K82.8 GALLBLADDER SLUDGE: ICD-10-CM

## 2023-03-15 DIAGNOSIS — K91.89 POST-ERCP ACUTE PANCREATITIS: Primary | ICD-10-CM

## 2023-03-15 DIAGNOSIS — K85.90 POST-ERCP ACUTE PANCREATITIS: ICD-10-CM

## 2023-03-15 DIAGNOSIS — K91.89 POST-ERCP ACUTE PANCREATITIS: ICD-10-CM

## 2023-03-15 DIAGNOSIS — K85.90 POST-ERCP ACUTE PANCREATITIS: Primary | ICD-10-CM

## 2023-03-15 LAB
ALBUMIN SERPL-MCNC: 3.6 G/DL (ref 3.5–5)
ALBUMIN/GLOB SERPL: 1.1 (ref 0.4–1.6)
ALP SERPL-CCNC: 75 U/L (ref 50–136)
ALT SERPL-CCNC: 17 U/L (ref 12–65)
ANION GAP SERPL CALC-SCNC: 0 MMOL/L (ref 2–11)
AST SERPL-CCNC: 16 U/L (ref 15–37)
BASOPHILS # BLD: 0 K/UL (ref 0–0.2)
BASOPHILS NFR BLD: 0 % (ref 0–2)
BILIRUB SERPL-MCNC: 0.5 MG/DL (ref 0.2–1.1)
BUN SERPL-MCNC: 5 MG/DL (ref 6–23)
CALCIUM SERPL-MCNC: 9 MG/DL (ref 8.3–10.4)
CHLORIDE SERPL-SCNC: 112 MMOL/L (ref 101–110)
CO2 SERPL-SCNC: 27 MMOL/L (ref 21–32)
CREAT SERPL-MCNC: 0.7 MG/DL (ref 0.8–1.5)
DIFFERENTIAL METHOD BLD: NORMAL
EOSINOPHIL # BLD: 0.1 K/UL (ref 0–0.8)
EOSINOPHIL NFR BLD: 2 % (ref 0.5–7.8)
ERYTHROCYTE [DISTWIDTH] IN BLOOD BY AUTOMATED COUNT: 12.9 % (ref 11.9–14.6)
GLOBULIN SER CALC-MCNC: 3.3 G/DL (ref 2.8–4.5)
GLUCOSE SERPL-MCNC: 94 MG/DL (ref 65–100)
HCT VFR BLD AUTO: 45.1 % (ref 41.1–50.3)
HGB BLD-MCNC: 14.9 G/DL (ref 13.6–17.2)
IMM GRANULOCYTES # BLD AUTO: 0 K/UL (ref 0–0.5)
IMM GRANULOCYTES NFR BLD AUTO: 0 % (ref 0–5)
LIPASE SERPL-CCNC: 310 U/L (ref 73–393)
LYMPHOCYTES # BLD: 2.1 K/UL (ref 0.5–4.6)
LYMPHOCYTES NFR BLD: 31 % (ref 13–44)
MCH RBC QN AUTO: 30.5 PG (ref 26.1–32.9)
MCHC RBC AUTO-ENTMCNC: 33 G/DL (ref 31.4–35)
MCV RBC AUTO: 92.4 FL (ref 82–102)
MONOCYTES # BLD: 0.6 K/UL (ref 0.1–1.3)
MONOCYTES NFR BLD: 8 % (ref 4–12)
NEUTS SEG # BLD: 4 K/UL (ref 1.7–8.2)
NEUTS SEG NFR BLD: 59 % (ref 43–78)
NRBC # BLD: 0 K/UL (ref 0–0.2)
PLATELET # BLD AUTO: 308 K/UL (ref 150–450)
PMV BLD AUTO: 10.5 FL (ref 9.4–12.3)
POTASSIUM SERPL-SCNC: 4.1 MMOL/L (ref 3.5–5.1)
PROT SERPL-MCNC: 6.9 G/DL (ref 6.3–8.2)
RBC # BLD AUTO: 4.88 M/UL (ref 4.23–5.6)
SODIUM SERPL-SCNC: 139 MMOL/L (ref 133–143)
WBC # BLD AUTO: 6.8 K/UL (ref 4.3–11.1)

## 2023-03-15 PROCEDURE — 99214 OFFICE O/P EST MOD 30 MIN: CPT | Performed by: SURGERY

## 2023-03-15 RX ORDER — URSODIOL 250 MG/1
250 TABLET, FILM COATED ORAL 3 TIMES DAILY
Qty: 90 TABLET | Refills: 3 | Status: SHIPPED | OUTPATIENT
Start: 2023-03-15

## 2023-03-15 NOTE — PROGRESS NOTES
H&P/Consult Note/Progress Note/Office Note:   Sita Castellon MRN: [id number]  :1965  Age:57 y.o.    HPI: Sita Castellon is a 61 y.o. male who was admitted by the hospitalist on 23 after he presented with epigastric pain. He reported severe nausea beginning in 2023 with p.o. intake  This was associated with upper abdominal discomfort and dark urine  He was prescribed Protonix by his primary care at first which did not help  He then had US below and was referred to GI    23 RUQ [facility] US ([place])  Liver: Intrahepatic biliary duct dilatation is present. No focal liver lesions visualized. Gallbladder: The gallbladder is distended and contains sludge. No pericholecystic fluid. No sonographic Pop sign. Common bile duct: Dilated to 10 mm. Pancreas: The body the pancreas is seen and there is pancreatic duct dilatation to 4 mm in the body the pancreas. The head and tail the pancreas are not adequately visualized by ultrasound. Right kidney: Normal.     IMPRESSION:     Common duct dilatation, intrahepatic biliary duct dilatation, and gallbladder distention likely represents biliary obstruction. Superimposed pancreatic duct dilatation is also present. This constellation of findings can be seen in the setting of a pancreatic head mass. The head of the pancreas is not adequately seen on today's exam.   I would recommend pancreatic mass protocol CT and gastroenterology consultation. 23 ERCP with stent; Dr Edwina Avalos:  Biliary stricture just proximal to ampulla. Could visualize this area post papillotomy. Sludge and cloudy bile extracted. Able to pass 12 mm balloon with resistance. Good drainage with stent. Pancreas- not able to cannulate. GB- ? Sludge vs small stones  Biggest concern would a small pancreatic head tumor          23 EUS; Dr Alea Cisneros:   1.  Extensive pancreatic parenchymal abnormalities are consistent with chronic pancreatitis based on EUS criteria. It should be noted that the presence of chronic pancreatitis decreases the sensitivity of EUS for detection of pancreatic tumors. 2. 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 FNA was performed. 3. Pancreatic cyst. Morphologic features are most typical for pseudocyst. This is currently too small to warrant FNA. Soon after the EUS that he developed worsening abdominal pain and came to the ER. He denies prior abdominal surgery. He is not taking blood thinners. In ER on 2/19/23 wbc 15.8k, hgb 16.7, plt 306k  T bili 1.4, AST/ALT 12/42, alk [id number]  Lipase 1088  Gen Surgery was consulted after the below CT       2/18/23 CT abd/pelvis with IV contrast    Hx: upper abd pain s/p ERCP     Lower Chest: Scattered atelectasis in the lung bases. Liver: Normal.     Gallbladder/Billary: The gallbladder is inflamed. 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 A biliary stent is in good position. Pancreas: Normal.     Spleen: Normal.     Adrenal Glands: Normal.     Kidneys: Normal.     GI Tract: The stomach and duodenum are unremarkable. Normal appendix. Normal small bowel. Mesentery/Peritoneum: Hazy edema and inflammation in the upper central mesentery. No free intraperitoneal air. Vasculature: Normal.     Lymph Nodes: Normal.     Abdominal Wall: See musculoskeletal section. Bladder: Normal.     Reproductive: Normal.     Musculoskeletal: Normal.     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 2. Biliary stent in good position         2/27/23 CT abd/pelvis with oral and IV contrast      FINDINGS: -Lung Bases: The lung bases are clear.     -Liver: Appears uniform. -Gallbladder: Gallbladder wall appears thickened. The gallbladder gas has resolved. -Bile Ducts: Not dilated. Biliary stent is present.     -Pancreas: Enhances uniformly.  Pancreatic stranding densities a small amount of fluid remain.   -Spleen: Uniform and normal size.     -Stomach: Unremarkable. -Bowel: Normal caliber. Long normal appendix with tip in the inferior margin of the liver   Some fatty stratification of the terminal ileum   Small soft tissue density and bubble of gas is seen in the right ischial rectal fossa suggesting an infralevator ani perianal fistula. -Adrenals: Are normal size.   -Kidneys/Ureters: Enhance symmetrically. No hydronephrosis. -Urinary Bladder: Unremarkable. -Reproductive Organs: Prostate calcifications     -Lymph Nodes: No grossly enlarged retroperitoneal, mesenteric, or pelvic adenopathy.   -Vasculature: Aorta is normal caliber. Retroaortic left renal vein, normal variant.   -Bones: No gross bony lesions.     -Other: No ascites. Impression:  1) Mild gallbladder wall thickening although the gas within the lumen has resolved. 2) Small soft tissue density and bubble of gas is seen in the right ischial rectal fossa suggesting an infralevator ani perianal fistula. 3) Fatty stratification of the terminal ileum suggesting chronic inflammation. Regional enteritis should be considered. Additional hx:  2/21/23 feels much better; still with some epigastric pain; ambulating in room  2/23/23 abd pain improved; AF; TPN/NPO;  IV Zosyn for cholecysto-colic fistula on CT  2/48/36 feels better; AF; WBC 6.3k; On TPN for pancreatitis; repeat CT Monday 2/25/23 Feels OK; AF; ON TPN for pancreatitis and cholecysto-colic fistula suspected on initial; CT; Repeat CT Monday am ordered; OR Monday afternoon if needed  2/26/23 some mild LUQ pain this am;  AF/VSS; WBC normal; repeat CT in am; possible surgery Monday afternoon 2/27/23 comfortable, no anorectal pain (see CT);  AF/VSS; WBC normal ; Repeat CT this am with oral and IV contrast shows improvement;  No surgery today  2/28/23 no pain; feels well; tolerating liquid diet; Home today on Augmentin  3/15/23 office visit; feels OK; intermittent back pain; No N/V/fever         Past Medical History:   Diagnosis Date    GERD (gastroesophageal reflux disease)     medication    Sleep apnea     has a CPAP but does not use     Past Surgical History:   Procedure Laterality Date    COLONOSCOPY      ERCP N/A 2/1/2023    ERCP SPHINCTER/PAPILLOTOMY/BALLOON SWEEP/STENT PLACEMENT performed by Bel Owen MD at [facility] ENDOSCOPY    ERCP W/ PLASTIC STENT PLACEMENT  02/01/2023    SINUS SURGERY      x3    VASECTOMY       Current Outpatient Medications   Medication Sig    oxyCODONE 5 MG capsule Take 5 mg by mouth every 4 hours as needed for Pain. acetaminophen (TYLENOL) 500 MG tablet Take 500 mg by mouth every 6 hours as needed for Pain    pantoprazole (PROTONIX) 20 MG tablet Take 20 mg by mouth 2 times daily (Patient not taking: Reported on 2/18/2023)    sucralfate (CARAFATE) 1 GM tablet Take 1 g by mouth 3 times daily (Patient not taking: Reported on 2/18/2023)    ondansetron (ZOFRAN-ODT) 4 MG disintegrating tablet Take 4 mg by mouth 3 times daily as needed (Patient not taking: Reported on 2/18/2023)    hydrocortisone (ANUSOL-HC) 25 MG suppository Place 25 mg rectally 2 times daily as needed     No current facility-administered medications for this visit. ALLERGIES:  Patient has no known allergies. Social History     Socioeconomic History    Marital status:    Tobacco Use    Smoking status: Every Day     Packs/day: 0.50     Years: 40.00     Pack years: 20.00     Types: Cigarettes    Smokeless tobacco: Never   Vaping Use    Vaping Use: Never used   Substance and Sexual Activity    Alcohol use: Not Currently    Drug use: Not Currently     Social History     Tobacco Use   Smoking Status Every Day    Packs/day: 0.50    Years: 40.00    Pack years: 20.00    Types: Cigarettes   Smokeless Tobacco Never     No family history on file. ROS: The patient has no difficulty with chest pain or shortness of breath.   No fever or chills. Comprehensive review of systems was otherwise unremarkable except as noted above. Physical Exam:   There were no vitals taken for this visit. There were no vitals filed for this visit. 02/27 1901 - 02/28 0700  In: 1165.5 [I.V.:1165.5]  Out: 800 [Urine:800]  02/26 0701 - 02/27 1900  In: 5402.8 [P.O.:50; I.V.:5352.8]  Out: 1900 [Urine:1900]    Constitutional: Alert, oriented, cooperative patient in no acute distress; appears stated age    Eyes:Sclera are clear. EOMs intact  ENMT: no external lesions gross hearing normal; no obvious neck masses, no ear or lip lesions, nares normal  CV: RRR. Normal perfusion  Resp: No JVD. Breathing is  non-labored; no audible wheezing. GI: soft and non-distended    Musculoskeletal: unremarkable with normal function. No embolic signs or cyanosis.    Neuro:  Oriented; moves all 4; no focal deficits  Psychiatric: normal affect and mood, no memory impairment    Recent vitals (if inpt):  Patient Vitals for the past 24 hrs:   BP Temp Temp src Pulse Resp SpO2 Height Weight   02/28/23 0638 -- -- -- -- -- -- -- 182 lb (82.6 kg)   02/28/23 0426 109/69 97.3 °F (36.3 °C) Oral 66 18 98 % -- --   02/28/23 0105 -- -- -- -- 16 -- -- --   02/28/23 0035 -- -- -- -- 16 -- -- --   02/27/23 2313 100/69 97.5 °F (36.4 °C) Oral 65 18 97 % -- --   02/27/23 1950 -- -- -- 66 18 -- -- --   02/27/23 1922 119/74 97.5 °F (36.4 °C) Oral 64 16 96 % -- --   02/27/23 1503 102/74 97.7 °F (36.5 °C) Oral 59 16 96 % -- --   02/27/23 1124 109/61 98 °F (36.7 °C) Oral 63 16 96 % 6' (1.829 m) 180 lb (81.6 kg)   02/27/23 0741 105/69 98.2 °F (36.8 °C) Oral 61 16 98 % -- --       Amount and/or Complexity of Data Reviewed and Analyzed:  I reviewed and analyzed all of the unique labs and radiologic studies that are shown below as well as any that are in the HPI, and any that are in the expanded problem list below  *Each unique test, order, or document contributes to the combination of 2 or combination of 3 in Category 1 below. For this visit I also reviewed old records and prior notes. No results for input(s): WBC, HGB, PLT, NA, K, CL, CO2, BUN, CREA, GLU, INR, APTT, ALT, AML, AML, LCAD, PCO2, PO2, HCO3 in the last 72 hours. Invalid input(s): PTP, TBIL, TBILI, CBIL, SGOT, GPT, AP, LPSE, NH4, TROPT, TROIQ,  PH    Review of most recent CBC  Lab Results   Component Value Date    WBC 6.9 02/28/2023    HGB 15.5 02/28/2023    HCT 44.2 02/28/2023    MCV 89.7 02/28/2023     02/28/2023       Review of most recent BMP  Lab Results   Component Value Date/Time     02/28/2023 07:14 AM    K 3.8 02/28/2023 07:14 AM     02/28/2023 07:14 AM    CO2 22 02/28/2023 07:14 AM    BUN 16 02/28/2023 07:14 AM    CREATININE 0.80 02/28/2023 07:14 AM    GLUCOSE 132 02/28/2023 07:14 AM    CALCIUM 9.4 02/28/2023 07:14 AM        Review of most recent LFTs (and lipase if done)  Lab Results   Component Value Date    ALT 44 02/27/2023    AST 15 02/27/2023    ALKPHOS 85 02/27/2023    BILITOT 0.6 02/27/2023     Lab Results   Component Value Date    LIPASE 130 02/21/2023       Lab Results   Component Value Date/Time    INR 1.1 02/27/2023 05:54 AM       Review of most recent HgbA1c  No results found for: LABA1C    Nutritional assessment screen for wound healing issues:  Lab Results   Component Value Date    LABALBU 3.1 (L) 02/27/2023       @lastcovr@    Xray Result (most recent):  No results found for this or any previous visit from the past 3650 days. CT Result (most recent):  CT ABDOMEN PELVIS W IV CONTRAST 02/27/2023    Narrative  CT ABDOMEN AND PELVIS WITH CONTRAST. INDICATION: Pancreatitis and colonic fistula. COMPARISON: 9 days prior, 18 and February    TECHNIQUE: 5 mm axial scans from above the diaphragms to the pubic symphysis  following oral and 100 cc intravenous contrast without acute complication. Intravenous contrast was given to increase the sensitivity to acute  inflammation.   Radiation dose reduction techniques were used for this study. Our CT scanners use one or more of the following: Automated exposure control,  adjustment of the mA and or kV according to patient size, iterative  reconstruction. FINDINGS:  -Lung Bases: The lung bases are clear.    -Liver: Appears uniform. -Gallbladder: Gallbladder wall appears thickened. The gallbladder gas has  resolved. -Bile Ducts: Not dilated. Biliary stent is present.    -Pancreas: Enhances uniformly. Pancreatic stranding densities a small amount of  fluid remain.  -Spleen: Uniform and normal size.    -Stomach: Unremarkable. -Bowel:  Normal caliber. Long normal appendix with tip in the inferior margin of the liver  Some fatty stratification of the terminal ileum  Small soft tissue density and bubble of gas is seen in the right ischial rectal  fossa suggesting an infralevator ani perianal fistula. -Adrenals: Are normal size.  -Kidneys/Ureters: Enhance symmetrically. No hydronephrosis. -Urinary Bladder: Unremarkable. -Reproductive Organs: Prostate calcifications    -Lymph Nodes: No grossly enlarged retroperitoneal, mesenteric, or pelvic  adenopathy.  -Vasculature: Aorta is normal caliber. Retroaortic left renal vein, normal  variant.  -Bones: No gross bony lesions.    -Other: No ascites. Impression  1) Mild gallbladder wall thickening although the gas within the lumen has  resolved. 2) Small soft tissue density and bubble of gas is seen in the right ischial  rectal fossa suggesting an infralevator ani perianal fistula. 3) Fatty stratification of the terminal ileum suggesting chronic inflammation. Regional enteritis should be considered. US Result (most recent):  No results found for this or any previous visit from the past 3650 days. Admission date (for inpatients): (Not on file)   * No surgery found *  * No surgery found *        ASSESSMENT/PLAN:  [unfilled]  Active Problems:    * No active hospital problems.  *  Resolved Problems:    * No resolved hospital problems. *     Patient Active Problem List    Diagnosis Date Noted    Post-ERCP acute pancreatitis 02/22/2023     Priority: Medium    Biliary stricture 02/18/2023     Priority: Medium    Abdominal pain 02/18/2023     Priority: Medium    Hyponatremia 02/18/2023     Priority: Medium    Tobacco use 02/18/2023     Priority: Medium    Pancreatitis 02/18/2023     Priority: Medium    Acute cholecystitis 02/18/2023     Priority: Medium     Added automatically from request for surgery [id number]            Number and Complexity of Problems addressed and   Risks of complications and/or morbidity of management              Abnormal GB on CT  Repeat CT on 2/27/23 makes cholecysto-colic fistula less likely as there is no longer any gas in GB  The initial gas in the GB may have been from the ERCP for stent placement  On CT there is also lots of oral contrast in proximal transverse colon adjacent to GB with no extravasation of contrast into GB. IV Zosyn while inpt-->PO Augmentin as outpt  Resolving acute pancreatitis and peripancreatic edema    We plan interval cholecystectomy following resolution of his pancreatitis  Check CBC, lipase, CMP, and HIDA  Actigall 250mg TID for GB sludge        Acute Pancreatitis after EUS and FNA of pancreas  Improved pancreatic edema on CT 2/27/28  TPN via PICC   Morphine ordered for pain  FNA biopsy showed no evidence of malignancy  Etiology of distal CBD stricture most likely multifactorial recurrent pancreatitis (alcohol, gallstones/CBD stone)            Level of MDM (2/3 elements below)  Number and Complexity of Problems Addressed Amount and/or Complexity of Data to be Reviewed and Analyzed  *Each unique test, order, or document contributes to the combination of 2 or combination of 3 in Category 1 below.  Risk of Complications and/or Morbidity or Mortality of pt Management     [phone] SF Minimal  ?1self-limited or minor problem Minimal or none Minimal risk of morbidity from additional diagnostic testing or Rx   37818  [id number] Low Low  ? 2or more self-limited or minor problems;    or  ? 1stable chronic illness;    or  ?1acute, uncomplicated illness or injury   Limited  (Must meet the requirements of at least 1 of the 2 categories)  Category 1: Tests and documents   ? Any combination of 2 from the following:  ?Review of prior external note(s) from each unique source*;  ?review of the result(s) of each unique test*;   ?ordering of each unique test*    or   Category 2: Assessment requiring an independent historian(s)  (For the categories of independent interpretation of tests and discussion of management or test interpretation, see moderate or high) Low risk of morbidity from additional diagnostic testing or treatment     43635  94410 Mod Moderate  ? 1or more chronic illnesses with exacerbation, progression, or side effects of treatment;    or  ?2or more stable chronic illnesses;    or  ?1undiagnosed new problem with uncertain prognosis;    or  ?1acute illness with systemic symptoms;    or  ?1acute complicated injury   Moderate  (Must meet the requirements of at least 1 out of 3 categories)  Category 1: Tests, documents, or independent historian(s)  ? Any combination of 3 from the following:   ?Review of prior external note(s) from each unique source*;  ?Review of the result(s) of each unique test*;  ?Ordering of each unique test*;  ?Assessment requiring an independent historian(s)    or  Category 2: Independent interpretation of tests   ? Independent interpretation of a test performed by another physician/other qualified health care professional (not separately reported);     or  Category 3: Discussion of management or test interpretation  ? Discussion of management or test interpretation with external physician/other qualified health care professional/appropriate source (not separately reported)   Moderate risk of morbidity from additional diagnostic testing or treatment  Examples only:  ?Prescription drug management   ? Decision regarding minor surgery with identified patient or procedure risk factors  ? Decision regarding elective major surgery without identified patient or procedure risk factors   ? Diagnosis or treatment significantly limited by social determinants of health       37815  54284 High High  ? 1or more chronic illnesses with severe exacerbation, progression, or side effects of treatment;    or  ?1 acute or chronic illness or injury that poses a threat to life or bodily function   Extensive  (Must meet the requirements of at least 2 out of 3 categories)  Category 1: Tests, documents, or independent historian(s)  ? Any combination of 3 from the following:   ?Review of prior external note(s) from each unique source*;  ?Review of the result(s) of each unique test*;   ?Ordering of each unique test*;   ?Assessment requiring an independent historian(s)    or   Category 2: Independent interpretation of tests   ? Independent interpretation of a test performed by another physician/other qualified health care professional (not separately reported);     or  Category 3: Discussion of management or test interpretation  ? Discussion of management or test interpretation with external physician/other qualified health care professional/appropriate source (not separately reported)   High risk of morbidity from additional diagnostic testing or treatment  Examples only:  ?Drug therapy requiring intensive monitoring for toxicity  ? Decision regarding elective major surgery with identified patient or procedure risk factors  ? Decision regarding emergency major surgery  ? Decision regarding hospitalization  ? Decision not to resuscitate or to de-escalate care because of poor prognosis      Parenteral controlled substances             I have personally performed a face-to-face diagnostic evaluation and management  service on this patient. I have independently seen the patient.    I have independently obtained the above history from the patient/family. I have independently examined the patient with above findings. I have independently reviewed data/labs for this patient and developed the above plan of care (MDM).       Signed: Rika Ambriz MD  3/15/2023    7:57 AM

## 2023-03-23 ENCOUNTER — HOSPITAL ENCOUNTER (OUTPATIENT)
Dept: NUCLEAR MEDICINE | Age: 58
Discharge: HOME | End: 2023-03-23
Payer: COMMERCIAL

## 2023-03-23 DIAGNOSIS — K85.90 ACUTE PANCREATITIS, UNSPECIFIED COMPLICATION STATUS, UNSPECIFIED PANCREATITIS TYPE: ICD-10-CM

## 2023-03-23 DIAGNOSIS — K83.1 BILIARY STRICTURE: Chronic | ICD-10-CM

## 2023-03-23 DIAGNOSIS — K82.8 GALLBLADDER SLUDGE: ICD-10-CM

## 2023-03-23 PROCEDURE — 78227 HEPATOBIL SYST IMAGE W/DRUG: CPT

## 2023-03-23 PROCEDURE — A9537 TC99M MEBROFENIN: HCPCS | Performed by: SURGERY

## 2023-03-23 PROCEDURE — 6360000004 HC RX CONTRAST MEDICATION: Performed by: SURGERY

## 2023-03-23 PROCEDURE — 3430000000 HC RX DIAGNOSTIC RADIOPHARMACEUTICAL: Performed by: SURGERY

## 2023-03-23 RX ORDER — KIT FOR THE PREPARATION OF TECHNETIUM TC 99M MEBROFENIN 45 MG/10ML
6 INJECTION, POWDER, LYOPHILIZED, FOR SOLUTION INTRAVENOUS
Status: COMPLETED | OUTPATIENT
Start: 2023-03-23 | End: 2023-03-23

## 2023-03-23 RX ADMIN — SINCALIDE 1.6 MCG: 5 INJECTION, POWDER, LYOPHILIZED, FOR SOLUTION INTRAVENOUS at 09:00

## 2023-03-23 RX ADMIN — MEBROFENIN 6 MILLICURIE: 45 INJECTION, POWDER, LYOPHILIZED, FOR SOLUTION INTRAVENOUS at 08:00

## 2023-03-27 ENCOUNTER — PREP FOR PROCEDURE (OUTPATIENT)
Dept: SURGERY | Age: 58
End: 2023-03-27

## 2023-03-27 ENCOUNTER — OFFICE VISIT (OUTPATIENT)
Dept: SURGERY | Age: 58
End: 2023-03-27
Payer: COMMERCIAL

## 2023-03-27 VITALS — HEIGHT: 72 IN | BODY MASS INDEX: 24.65 KG/M2 | WEIGHT: 182 LBS

## 2023-03-27 DIAGNOSIS — K85.90 POST-ERCP ACUTE PANCREATITIS: ICD-10-CM

## 2023-03-27 DIAGNOSIS — Z72.0 TOBACCO USE: ICD-10-CM

## 2023-03-27 DIAGNOSIS — K91.89 POST-ERCP ACUTE PANCREATITIS: ICD-10-CM

## 2023-03-27 DIAGNOSIS — K81.0 ACUTE CHOLECYSTITIS: Primary | ICD-10-CM

## 2023-03-27 DIAGNOSIS — K83.1 BILIARY STRICTURE: Chronic | ICD-10-CM

## 2023-03-27 DIAGNOSIS — K85.90 ACUTE PANCREATITIS, UNSPECIFIED COMPLICATION STATUS, UNSPECIFIED PANCREATITIS TYPE: ICD-10-CM

## 2023-03-27 PROCEDURE — 99215 OFFICE O/P EST HI 40 MIN: CPT | Performed by: SURGERY

## 2023-03-27 NOTE — PROGRESS NOTES
Recommend surgical consultation. 2. Biliary stent in good position         2/27/23 CT abd/pelvis with oral and IV contrast      FINDINGS: -Lung Bases: The lung bases are clear.     -Liver: Appears uniform. -Gallbladder: Gallbladder wall appears thickened. The gallbladder gas has resolved. -Bile Ducts: Not dilated. Biliary stent is present.     -Pancreas: Enhances uniformly. Pancreatic stranding densities a small amount of fluid remain.   -Spleen: Uniform and normal size.     -Stomach: Unremarkable. -Bowel: Normal caliber. Long normal appendix with tip in the inferior margin of the liver   Some fatty stratification of the terminal ileum   Small soft tissue density and bubble of gas is seen in the right ischial rectal fossa suggesting an infralevator ani perianal fistula. -Adrenals: Are normal size.   -Kidneys/Ureters: Enhance symmetrically. No hydronephrosis. -Urinary Bladder: Unremarkable. -Reproductive Organs: Prostate calcifications     -Lymph Nodes: No grossly enlarged retroperitoneal, mesenteric, or pelvic adenopathy.   -Vasculature: Aorta is normal caliber. Retroaortic left renal vein, normal variant.   -Bones: No gross bony lesions.     -Other: No ascites. Impression:  1) Mild gallbladder wall thickening although the gas within the lumen has resolved. 2) Small soft tissue density and bubble of gas is seen in the right ischial rectal fossa suggesting an infralevator ani perianal fistula. 3) Fatty stratification of the terminal ileum suggesting chronic inflammation. Regional enteritis should be considered. 3/23/23 HIDA  There is prompt and homogeneous uptake of tracer by the liver. Activity is rapidly excreted into the biliary system. The gallbladder begins to fill at approximately 10-15 minutes. By the end of imaging common bile duct (CBD) and small bowel activity is seen.        After the initial portion of the test, the patient was given 1.6 mcg of CCK by slow IV injection

## 2023-03-28 RX ORDER — SODIUM CHLORIDE 0.9 % (FLUSH) 0.9 %
5-40 SYRINGE (ML) INJECTION EVERY 12 HOURS SCHEDULED
Status: CANCELLED | OUTPATIENT
Start: 2023-03-28

## 2023-03-28 RX ORDER — SODIUM CHLORIDE 9 MG/ML
INJECTION, SOLUTION INTRAVENOUS PRN
Status: CANCELLED | OUTPATIENT
Start: 2023-03-28

## 2023-03-28 RX ORDER — SODIUM CHLORIDE 0.9 % (FLUSH) 0.9 %
5-40 SYRINGE (ML) INJECTION PRN
Status: CANCELLED | OUTPATIENT
Start: 2023-03-28

## 2023-04-11 PROBLEM — K85.10 GALLSTONE PANCREATITIS: Status: ACTIVE | Noted: 2023-02-18

## 2023-05-01 NOTE — PROGRESS NOTES
SURGERY      x3    VASECTOMY       Current Outpatient Medications   Medication Sig    hydrocortisone (ANUSOL-HC) 25 MG suppository Place 1 suppository rectally 2 times daily as needed     No current facility-administered medications for this visit. ALLERGIES:  Patient has no known allergies. Social History     Socioeconomic History    Marital status:    Tobacco Use    Smoking status: Every Day     Packs/day: 0.50     Years: 40.00     Pack years: 20.00     Types: Cigarettes    Smokeless tobacco: Never   Vaping Use    Vaping Use: Never used   Substance and Sexual Activity    Alcohol use: Not Currently    Drug use: Not Currently     Social History     Tobacco Use   Smoking Status Every Day    Packs/day: 0.50    Years: 40.00    Pack years: 20.00    Types: Cigarettes   Smokeless Tobacco Never     No family history on file. ROS: The patient has no difficulty with chest pain or shortness of breath. No fever or chills. Comprehensive review of systems was otherwise unremarkable except as noted above. Physical Exam:   There were no vitals taken for this visit. There were no vitals filed for this visit. Constitutional: Alert, oriented, cooperative patient in no acute distress; appears stated age    Eyes:Sclera are clear. EOMs intact  ENMT: no external lesions gross hearing normal; no obvious neck masses, no ear or lip lesions, nares normal  CV: RRR. Normal perfusion  Resp: No JVD. Breathing is  non-labored; no audible wheezing. GI: soft and non-distended; Trochar sites CDI, clips removed. Steri strips and tegaderm applied. Cellulitis from tape on one trochar site (most superior)    Musculoskeletal: unremarkable with normal function. No embolic signs or cyanosis.    Neuro:  Oriented; moves all 4; no focal deficits  Psychiatric: normal affect and mood, no memory impairment    Recent vitals (if inpt):  Patient Vitals for the past 24 hrs:   BP Temp Temp src Pulse Resp SpO2 Height Weight   02/28/23

## 2023-05-03 ENCOUNTER — OFFICE VISIT (OUTPATIENT)
Dept: SURGERY | Age: 58
End: 2023-05-03

## 2023-05-03 DIAGNOSIS — Z09 POSTOP CHECK: ICD-10-CM

## 2023-05-03 DIAGNOSIS — K85.10 GALLSTONE PANCREATITIS: Primary | ICD-10-CM

## 2023-07-22 NOTE — PROGRESS NOTES
END OF SHIFT NOTE:    INTAKE/OUTPUT  02/18 0701 - 02/19 0700  In: -   Out: 600 [Urine:600]  Voiding: Yes  Catheter: No          Flatus: Patient does have flatus present. Stool:  0 occurrences. Emesis: 0 occurrences. VITAL SIGNS  Patient Vitals for the past 12 hrs:   Temp Pulse Resp BP SpO2   02/19/23 0343 98.4 °F (36.9 °C) 78 18 116/65 91 %   02/19/23 0258 -- -- 16 -- --   02/19/23 0228 -- -- 16 -- --   02/19/23 0011 -- (!) 102 -- -- --   02/18/23 2345 -- -- -- -- 93 %   02/18/23 2339 98.2 °F (36.8 °C) (!) 103 18 124/81 93 %   02/18/23 2023 -- -- -- -- 95 %   02/18/23 2015 -- -- -- 119/71 94 %       Ambulating  Yes    Shift report given to oncoming nurse at the bedside.     Li Hilario, RN Negative

## 2024-02-07 ENCOUNTER — TELEPHONE (OUTPATIENT)
Dept: GASTROENTEROLOGY | Age: 59
End: 2024-02-07

## 2024-02-07 ENCOUNTER — PREP FOR PROCEDURE (OUTPATIENT)
Dept: GASTROENTEROLOGY | Age: 59
End: 2024-02-07

## 2024-02-07 DIAGNOSIS — C25.9 ADENOCARCINOMA OF PANCREAS (HCC): ICD-10-CM

## 2024-02-07 RX ORDER — SODIUM CHLORIDE 9 MG/ML
25 INJECTION, SOLUTION INTRAVENOUS PRN
Status: CANCELLED | OUTPATIENT
Start: 2024-02-07

## 2024-02-07 RX ORDER — SODIUM CHLORIDE 0.9 % (FLUSH) 0.9 %
5-40 SYRINGE (ML) INJECTION EVERY 12 HOURS SCHEDULED
Status: CANCELLED | OUTPATIENT
Start: 2024-02-07

## 2024-02-07 RX ORDER — SODIUM CHLORIDE 0.9 % (FLUSH) 0.9 %
5-40 SYRINGE (ML) INJECTION PRN
Status: CANCELLED | OUTPATIENT
Start: 2024-02-07

## 2024-02-07 NOTE — TELEPHONE ENCOUNTER
Scheduled EUS w Celiac block 2/19, pt aware, sent prep instructions via [facility]

## 2024-02-15 RX ORDER — METHOCARBAMOL 750 MG/1
750 TABLET, FILM COATED ORAL 3 TIMES DAILY
COMMUNITY

## 2024-02-15 RX ORDER — SENNOSIDES A AND B 8.6 MG/1
1 TABLET, FILM COATED ORAL AS NEEDED
COMMUNITY

## 2024-02-15 RX ORDER — LORAZEPAM 1 MG/1
1 TABLET ORAL 2 TIMES DAILY
COMMUNITY

## 2024-02-15 RX ORDER — DOCUSATE SODIUM 100 MG/1
100 CAPSULE, LIQUID FILLED ORAL 2 TIMES DAILY PRN
COMMUNITY

## 2024-02-15 RX ORDER — ONDANSETRON 4 MG/1
4 TABLET, FILM COATED ORAL EVERY 8 HOURS PRN
COMMUNITY

## 2024-02-15 RX ORDER — FAMOTIDINE 10 MG
10 TABLET ORAL 2 TIMES DAILY
COMMUNITY

## 2024-02-15 RX ORDER — MORPHINE SULFATE 15 MG/1
15 TABLET ORAL EVERY 12 HOURS PRN
COMMUNITY

## 2024-02-15 RX ORDER — IBUPROFEN 200 MG
600 TABLET ORAL EVERY 6 HOURS PRN
COMMUNITY

## 2024-02-15 RX ORDER — PANCRELIPASE LIPASE, PANCRELIPASE AMYLASE, AND PANCRELIPASE PROTEASE 149900; 37000; 97300 [USP'U]/1; [USP'U]/1; [USP'U]/1
2 CAPSULE, DELAYED RELEASE ORAL 3 TIMES DAILY
COMMUNITY

## 2024-02-15 RX ORDER — OXYCODONE HYDROCHLORIDE 10 MG/1
10 TABLET ORAL EVERY 4 HOURS PRN
COMMUNITY

## 2024-02-15 RX ORDER — ACETAMINOPHEN 500 MG
500 TABLET ORAL EVERY 6 HOURS PRN
COMMUNITY

## 2024-02-15 NOTE — PERIOP NOTE
Patient verified name, , and procedure.    Type: 1a; abbreviated assessment per anesthesia guidelines  Labs per surgeon: none  Labs per anesthesia: none      Instructed pt that they will be notified by the Gi Lab for time of arrival. If any questions please call the GI lab at [phone].    Follow diet and prep instructions per office. May have clear liquids until 4 hours prior to time of arrival.    Bath or shower the night before and the am of surgery with antibacterial soap. No lotions, oils, powders, cologne on skin. No make up, eye make up or jewelry. Wear loose fitting comfortable, clean clothing.     Must have adult present in building the entire time .     Medications for the day of procedure Morphine (MSIR) if needed, Oxycodone if needed Lorazepam (Ativan) if needed  Zofran (ondansetron) if needed, Colace, Famotidine (Pepcid)     The following discharge instructions reviewed with patient: medication given during procedure may cause drowsiness for several hours, therefore, do not drive or operate machinery for remainder of the day, no alcohol on the day of your procedure, resume regular diet and activity unless otherwise directed, for mild sore throat you may use Cepacol throat lozenges or warm salt water gargles as needed, call your physician for any problems or questions. Patient verbalizes understanding.

## 2024-02-16 ENCOUNTER — HOSPITAL ENCOUNTER (OUTPATIENT)
Age: 59
Setting detail: OUTPATIENT SURGERY
Discharge: HOME | End: 2024-02-19
Attending: INTERNAL MEDICINE | Admitting: INTERNAL MEDICINE
Payer: COMMERCIAL

## 2024-02-18 ENCOUNTER — ANESTHESIA EVENT (OUTPATIENT)
Dept: ENDOSCOPY | Age: 59
End: 2024-02-18
Payer: COMMERCIAL

## 2024-02-19 ENCOUNTER — APPOINTMENT (OUTPATIENT)
Dept: GENERAL RADIOLOGY | Age: 59
End: 2024-02-19
Attending: INTERNAL MEDICINE
Payer: COMMERCIAL

## 2024-02-19 ENCOUNTER — ANESTHESIA (OUTPATIENT)
Dept: ENDOSCOPY | Age: 59
End: 2024-02-19
Payer: COMMERCIAL

## 2024-02-19 VITALS
WEIGHT: 141 LBS | HEIGHT: 72 IN | TEMPERATURE: 98 F | HEART RATE: 74 BPM | SYSTOLIC BLOOD PRESSURE: 94 MMHG | OXYGEN SATURATION: 95 % | RESPIRATION RATE: 16 BRPM | BODY MASS INDEX: 19.1 KG/M2 | DIASTOLIC BLOOD PRESSURE: 60 MMHG

## 2024-02-19 PROCEDURE — 74360 X-RAY GUIDE GI DILATION: CPT | Performed by: INTERNAL MEDICINE

## 2024-02-19 PROCEDURE — 2500000003 HC RX 250 WO HCPCS: Performed by: INTERNAL MEDICINE

## 2024-02-19 PROCEDURE — 7100000011 HC PHASE II RECOVERY - ADDTL 15 MIN: Performed by: INTERNAL MEDICINE

## 2024-02-19 PROCEDURE — 3609018500 HC EGD US SCOPE W/ADJACENT STRUCTURES: Performed by: INTERNAL MEDICINE

## 2024-02-19 PROCEDURE — 7100000010 HC PHASE II RECOVERY - FIRST 15 MIN: Performed by: INTERNAL MEDICINE

## 2024-02-19 PROCEDURE — 2580000003 HC RX 258: Performed by: ANESTHESIOLOGY

## 2024-02-19 PROCEDURE — C1874 STENT, COATED/COV W/DEL SYS: HCPCS | Performed by: INTERNAL MEDICINE

## 2024-02-19 PROCEDURE — 3700000000 HC ANESTHESIA ATTENDED CARE: Performed by: INTERNAL MEDICINE

## 2024-02-19 PROCEDURE — 2709999900 HC NON-CHARGEABLE SUPPLY: Performed by: INTERNAL MEDICINE

## 2024-02-19 PROCEDURE — 7100000000 HC PACU RECOVERY - FIRST 15 MIN: Performed by: INTERNAL MEDICINE

## 2024-02-19 PROCEDURE — 2500000003 HC RX 250 WO HCPCS

## 2024-02-19 PROCEDURE — 3700000001 HC ADD 15 MINUTES (ANESTHESIA): Performed by: INTERNAL MEDICINE

## 2024-02-19 PROCEDURE — 6360000002 HC RX W HCPCS

## 2024-02-19 PROCEDURE — 43253 EGD US TRANSMURAL INJXN/MARK: CPT | Performed by: INTERNAL MEDICINE

## 2024-02-19 PROCEDURE — C1726 CATH, BAL DIL, NON-VASCULAR: HCPCS | Performed by: INTERNAL MEDICINE

## 2024-02-19 PROCEDURE — 74330 X-RAY BILE/PANC ENDOSCOPY: CPT

## 2024-02-19 PROCEDURE — C1769 GUIDE WIRE: HCPCS | Performed by: INTERNAL MEDICINE

## 2024-02-19 PROCEDURE — 6360000002 HC RX W HCPCS: Performed by: INTERNAL MEDICINE

## 2024-02-19 PROCEDURE — 2580000003 HC RX 258

## 2024-02-19 PROCEDURE — 7100000001 HC PACU RECOVERY - ADDTL 15 MIN: Performed by: INTERNAL MEDICINE

## 2024-02-19 PROCEDURE — 43266 EGD ENDOSCOPIC STENT PLACE: CPT | Performed by: INTERNAL MEDICINE

## 2024-02-19 DEVICE — STENT AND ELECTROCAUTERY - ENHANCED DELIVERY SYSTEM
Type: IMPLANTABLE DEVICE | Status: FUNCTIONAL
Brand: AXIOS™

## 2024-02-19 RX ORDER — SUCCINYLCHOLINE CHLORIDE 20 MG/ML
INJECTION INTRAMUSCULAR; INTRAVENOUS PRN
Status: DISCONTINUED | OUTPATIENT
Start: 2024-02-19 | End: 2024-02-19 | Stop reason: SDUPTHER

## 2024-02-19 RX ORDER — ONDANSETRON 2 MG/ML
INJECTION INTRAMUSCULAR; INTRAVENOUS PRN
Status: DISCONTINUED | OUTPATIENT
Start: 2024-02-19 | End: 2024-02-19 | Stop reason: SDUPTHER

## 2024-02-19 RX ORDER — SODIUM CHLORIDE, SODIUM LACTATE, POTASSIUM CHLORIDE, CALCIUM CHLORIDE 600; 310; 30; 20 MG/100ML; MG/100ML; MG/100ML; MG/100ML
INJECTION, SOLUTION INTRAVENOUS CONTINUOUS
Status: DISCONTINUED | OUTPATIENT
Start: 2024-02-19 | End: 2024-02-19 | Stop reason: HOSPADM

## 2024-02-19 RX ORDER — IPRATROPIUM BROMIDE AND ALBUTEROL SULFATE 2.5; .5 MG/3ML; MG/3ML
1 SOLUTION RESPIRATORY (INHALATION)
Status: DISCONTINUED | OUTPATIENT
Start: 2024-02-19 | End: 2024-02-19 | Stop reason: HOSPADM

## 2024-02-19 RX ORDER — LIDOCAINE HYDROCHLORIDE 20 MG/ML
INJECTION, SOLUTION EPIDURAL; INFILTRATION; INTRACAUDAL; PERINEURAL PRN
Status: DISCONTINUED | OUTPATIENT
Start: 2024-02-19 | End: 2024-02-19 | Stop reason: SDUPTHER

## 2024-02-19 RX ORDER — BUPIVACAINE HYDROCHLORIDE AND EPINEPHRINE 2.5; 5 MG/ML; UG/ML
30 INJECTION, SOLUTION EPIDURAL; INFILTRATION; INTRACAUDAL; PERINEURAL ONCE
Status: COMPLETED | OUTPATIENT
Start: 2024-02-19 | End: 2024-02-19

## 2024-02-19 RX ORDER — HALOPERIDOL 5 MG/ML
1 INJECTION INTRAMUSCULAR
Status: DISCONTINUED | OUTPATIENT
Start: 2024-02-19 | End: 2024-02-19 | Stop reason: HOSPADM

## 2024-02-19 RX ORDER — TRIAMCINOLONE ACETONIDE 40 MG/ML
80 INJECTION, SUSPENSION INTRA-ARTICULAR; INTRAMUSCULAR ONCE
Status: COMPLETED | OUTPATIENT
Start: 2024-02-19 | End: 2024-02-19

## 2024-02-19 RX ORDER — LIDOCAINE HYDROCHLORIDE 10 MG/ML
1 INJECTION, SOLUTION INFILTRATION; PERINEURAL
Status: DISCONTINUED | OUTPATIENT
Start: 2024-02-19 | End: 2024-02-19 | Stop reason: HOSPADM

## 2024-02-19 RX ORDER — ROCURONIUM BROMIDE 10 MG/ML
INJECTION, SOLUTION INTRAVENOUS PRN
Status: DISCONTINUED | OUTPATIENT
Start: 2024-02-19 | End: 2024-02-19 | Stop reason: SDUPTHER

## 2024-02-19 RX ORDER — SODIUM CHLORIDE 0.9 % (FLUSH) 0.9 %
5-40 SYRINGE (ML) INJECTION EVERY 12 HOURS SCHEDULED
Status: DISCONTINUED | OUTPATIENT
Start: 2024-02-19 | End: 2024-02-19 | Stop reason: HOSPADM

## 2024-02-19 RX ORDER — ONDANSETRON 2 MG/ML
4 INJECTION INTRAMUSCULAR; INTRAVENOUS
Status: DISCONTINUED | OUTPATIENT
Start: 2024-02-19 | End: 2024-02-19 | Stop reason: HOSPADM

## 2024-02-19 RX ORDER — FENTANYL CITRATE 50 UG/ML
INJECTION, SOLUTION INTRAMUSCULAR; INTRAVENOUS PRN
Status: DISCONTINUED | OUTPATIENT
Start: 2024-02-19 | End: 2024-02-19 | Stop reason: SDUPTHER

## 2024-02-19 RX ORDER — PROPOFOL 10 MG/ML
INJECTION, EMULSION INTRAVENOUS PRN
Status: DISCONTINUED | OUTPATIENT
Start: 2024-02-19 | End: 2024-02-19 | Stop reason: SDUPTHER

## 2024-02-19 RX ORDER — SODIUM CHLORIDE 0.9 % (FLUSH) 0.9 %
5-40 SYRINGE (ML) INJECTION PRN
Status: DISCONTINUED | OUTPATIENT
Start: 2024-02-19 | End: 2024-02-19 | Stop reason: HOSPADM

## 2024-02-19 RX ORDER — GLYCOPYRROLATE 0.2 MG/ML
INJECTION INTRAMUSCULAR; INTRAVENOUS PRN
Status: DISCONTINUED | OUTPATIENT
Start: 2024-02-19 | End: 2024-02-19 | Stop reason: SDUPTHER

## 2024-02-19 RX ORDER — KETAMINE HYDROCHLORIDE 50 MG/ML
INJECTION, SOLUTION INTRAMUSCULAR; INTRAVENOUS PRN
Status: DISCONTINUED | OUTPATIENT
Start: 2024-02-19 | End: 2024-02-19 | Stop reason: SDUPTHER

## 2024-02-19 RX ADMIN — GLYCOPYRROLATE 0.2 MG: 0.2 INJECTION INTRAMUSCULAR; INTRAVENOUS at 13:30

## 2024-02-19 RX ADMIN — ONDANSETRON 4 MG: 2 INJECTION INTRAMUSCULAR; INTRAVENOUS at 13:30

## 2024-02-19 RX ADMIN — ROCURONIUM BROMIDE 5 MG: 50 INJECTION, SOLUTION INTRAVENOUS at 13:19

## 2024-02-19 RX ADMIN — KETAMINE HYDROCHLORIDE 20 MG: 50 INJECTION, SOLUTION INTRAMUSCULAR; INTRAVENOUS at 13:30

## 2024-02-19 RX ADMIN — PHENYLEPHRINE HYDROCHLORIDE 100 MCG: 10 INJECTION INTRAVENOUS at 13:41

## 2024-02-19 RX ADMIN — PHENYLEPHRINE HYDROCHLORIDE 100 MCG: 10 INJECTION INTRAVENOUS at 13:26

## 2024-02-19 RX ADMIN — PROPOFOL 150 MG: 10 INJECTION, EMULSION INTRAVENOUS at 13:19

## 2024-02-19 RX ADMIN — PHENYLEPHRINE HYDROCHLORIDE 100 MCG: 10 INJECTION INTRAVENOUS at 13:19

## 2024-02-19 RX ADMIN — Medication 100 MG: at 13:20

## 2024-02-19 RX ADMIN — LIDOCAINE HYDROCHLORIDE 100 MG: 20 INJECTION, SOLUTION EPIDURAL; INFILTRATION; INTRACAUDAL; PERINEURAL at 13:19

## 2024-02-19 RX ADMIN — FENTANYL CITRATE 50 MCG: 50 INJECTION, SOLUTION INTRAMUSCULAR; INTRAVENOUS at 13:19

## 2024-02-19 RX ADMIN — SODIUM CHLORIDE, POTASSIUM CHLORIDE, SODIUM LACTATE AND CALCIUM CHLORIDE: 600; 310; 30; 20 INJECTION, SOLUTION INTRAVENOUS at 12:12

## 2024-02-19 ASSESSMENT — PAIN - FUNCTIONAL ASSESSMENT: PAIN_FUNCTIONAL_ASSESSMENT: 0-10

## 2024-02-19 ASSESSMENT — LIFESTYLE VARIABLES: SMOKING_STATUS: 1

## 2024-02-19 NOTE — ANESTHESIA PRE PROCEDURE
Department of Anesthesiology  Preprocedure Note       Name:  Gerardo Conteh Jr.   Age:  58 y.o.  :  1965                                          MRN:  [id number]         Date:  2024      Surgeon: Surgeon(s):  Yang Vanegas MD    Procedure: Procedure(s):  EGD ESOPHAGOGASTRODUODENOSCOPY ULTRASOUND w Celiac pelxus block    Medications prior to admission:   Prior to Admission medications    Medication Sig Start Date End Date Taking? Authorizing Provider   oxyCODONE HCl (OXY-IR) 10 MG immediate release tablet Take 1 tablet by mouth every 4 hours as needed for Pain.   Yes Theron Haines MD   morphine (MSIR) 15 MG tablet Take 1 tablet by mouth every 12 hours as needed for Pain. Max Daily Amount: 30 mg   Yes Theron Haines MD   methocarbamol (ROBAXIN) 750 MG tablet Take 1 tablet by mouth 3 times daily Takes 2 tab q 8H   Yes Theron Haines MD   Pancrelipase, Lip-Prot-Amyl, (PANCREAZE) 10039-58338 units CPEP Take 2 capsules by mouth in the morning, at noon, and at bedtime With meals   Yes Theron Haines MD   LORazepam (ATIVAN) 1 MG tablet Take 1 tablet by mouth in the morning and at bedtime. Takes 1/2 tab in am and 1 tab at hs   Yes Theron Haines MD   ondansetron (ZOFRAN) 4 MG tablet Take 1 tablet by mouth every 8 hours as needed for Nausea or Vomiting   Yes Theron Haines MD   famotidine (PEPCID) 10 MG tablet Take 1 tablet by mouth 2 times daily   Yes Theron Haines MD   acetaminophen (TYLENOL) 500 MG tablet Take 1 tablet by mouth every 6 hours as needed for Pain   Yes Theron Haines MD   ibuprofen (ADVIL;MOTRIN) 200 MG tablet Take 3 tablets by mouth every 6 hours as needed for Pain   Yes Theron Haines MD   docusate sodium (COLACE) 100 MG capsule Take 1 capsule by mouth 2 times daily as needed for Constipation   Yes Theron Haines MD   senna (SENOKOT) 8.6 MG tablet Take 1 tablet by mouth as needed for Constipation   Yes Zaki

## 2024-02-19 NOTE — H&P
Gastroenterology and Interventional Endoscopy Pre-procedure HPI    Date of visit   :  02/19/24      Patient name :  Gerardo Conteh Jr.  YOB: 1965    HPI:  58-year-old gentleman who has unresectable pancreatic cancer. He still having daily abdominal pain requiring multiple pain medications many times daily. He is intermittently taking long-acting oral morphine. He has a PEG-J in place. He is no longer using it. He is tolerating a diet by mouth. He has a 10 Kinyarwanda biliary stent in place. He has no sign of obstruction. 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       ____________________      Past Medical History:  Reviewed, and in prior HPI,documentation    Surgical History:    Reviewed, and in prior HPI,documentation    Medications:    Reviewed, and in prior HPI,documentation    Allergies:  No Known Allergies    Social History:    Reviewed, and in prior HPI,documentation    Family History:    Reviewed, and in prior HPI,documentation  Physical Exam:    Vitals:    02/19/24 1200   BP: 112/61   Pulse: 75   Resp: 15   Temp: 97.7 °F (36.5 °C)   SpO2: 99%     Body mass index is 19.12 kg/m².         Constitutional: Alert, oriented.  No acute distress  Head: Normocephalic and Atraumatic  Eyes: No icterus, EOMI, no congestion  ENT: No deformity, no hearing loss   Cardiovascular: Regular rate and rhythm, S1-S2 normal, no murmur rub or gallop  Respiratory: Clear to auscultation bilaterally no wheezing rhonchi or crackles  Gastrointestinal:, No hepatosplenomegaly nontender nondistended bowel sounds present in all 4 quadrants  Musculoskeletal: No joint deformity, no swelling in larger joints including knees elbows hands shoulders  Skin: No new rash.  Neurologic: Alert oriented to time place and person , good affect  ____________________    Recommendations:  --Plan for EUS with celiac block    Yang Vanegas MD  Gastroenterology

## 2024-02-19 NOTE — PROGRESS NOTES
Pre-op prayer request received. Prayer and support given to patient and sister.    Rev. CAYETANO Herrera

## 2024-02-19 NOTE — ANESTHESIA PROCEDURE NOTES
Airway  Date/Time: 2/19/2024 1:22 PM  Urgency: elective    Airway not difficult    General Information and Staff    Patient location during procedure: OR  Resident/CRNA: Natalia Navarrete APRN - CRNA  Performed: resident/CRNA   Performed by: Natalia Navarrete APRN - CRNA  Authorized by: Manuel Steve MD      Indications and Patient Condition  Indications for airway management: anesthesia  Spontaneous Ventilation: absent  Sedation level: deep  Preoxygenated: yes  Patient position: sniffing  MILS not maintained throughout  Mask difficulty assessment: vent by bag mask    Final Airway Details  Final airway type: endotracheal airway      Successful airway: ETT  Cuffed: yes   Successful intubation technique: direct laryngoscopy  Facilitating devices/methods: intubating stylet  Endotracheal tube insertion site: oral  Blade: Izquierdo  Blade size: #2  ETT size (mm): 7.0  Cormack-Lehane Classification: grade I - full view of glottis  Placement verified by: chest auscultation and capnometry   Cuff volume (mL): 8  Measured from: teeth  ETT to teeth (cm): 21  Number of attempts at approach: 1  Ventilation between attempts: bag mask  Number of other approaches attempted: 0    Additional Comments  Lips and dentition unchanged.  no

## 2024-02-19 NOTE — ANESTHESIA POSTPROCEDURE EVALUATION
Department of Anesthesiology  Postprocedure Note    Patient: Gerardo Conteh Jr.  MRN: [id number]  YOB: 1965  Date of evaluation: 2/19/2024    Procedure Summary     Date: 02/19/24 Room / Location: [facility] ENDO FLOURO 1 / [facility] ENDOSCOPY    Anesthesia Start: 1314 Anesthesia Stop: 1405    Procedure: EGD ESOPHAGOGASTRODUODENOSCOPY ULTRASOUND w Celiac pelxus block (Upper GI Region) Diagnosis:       Adenocarcinoma of pancreas (HCC)      (Adenocarcinoma of pancreas (HCC) [C25.9])    Surgeons: Yang Vanegas MD Responsible Provider: Manuel Steve MD    Anesthesia Type: TIVA ASA Status: 2          Anesthesia Type: No value filed.    Trino Phase I: Trino Score: 8    Trino Phase II: Trino Score: 10    Anesthesia Post Evaluation    Patient location during evaluation: PACU  Patient participation: complete - patient participated  Level of consciousness: awake and alert  Airway patency: patent  Nausea & Vomiting: no nausea and no vomiting  Cardiovascular status: hemodynamically stable  Respiratory status: acceptable, nonlabored ventilation and spontaneous ventilation  Hydration status: euvolemic  Comments: BP 94/60   Pulse 74   Temp 98 °F (36.7 °C) (Temporal)   Resp 16   Ht 1.829 m (6')   Wt 64 kg (141 lb)   SpO2 95%   BMI 19.12 kg/m²     Multimodal analgesia pain management approach  Pain management: adequate and satisfactory to patient        No notable events documented.

## 2024-02-19 NOTE — DISCHARGE INSTRUCTIONS
after a day or two.  You do not get better as expected.    After general anesthesia or intravenous sedation, for 24 hours or while taking prescription Narcotics:  Limit your activities  A responsible adult needs to be with you for the next 24 hours  Do not drive and operate hazardous machinery  Do not make important personal or business decisions  Do not drink alcoholic beverages  If you have not urinated within 8 hours after discharge, and you are experiencing discomfort from urinary retention, please go to the nearest ED.  If you have sleep apnea and have a CPAP machine, please use it for all naps and sleeping.  Please use caution when taking narcotics and any of your home medications that may cause drowsiness.  *  Please give a list of your current medications to your Primary Care Provider.  *  Please update this list whenever your medications are discontinued, doses are      changed, or new medications (including over-the-counter products) are added.  *  Please carry medication information at all times in case of emergency situations.    These are general instructions for a healthy lifestyle:  No smoking/ No tobacco products/ Avoid exposure to second hand smoke  Surgeon General's Warning:  Quitting smoking now greatly reduces serious risk to your health.  Obesity, smoking, and sedentary lifestyle greatly increases your risk for illness  A healthy diet, regular physical exercise & weight monitoring are important for maintaining a healthy lifestyle    You may be retaining fluid if you have a history of heart failure or if you experience any of the following symptoms:  Weight gain of 3 pounds or more overnight or 5 pounds in a week, increased swelling in our hands or feet or shortness of breath while lying flat in bed.  Please call your doctor as soon as you notice any of these symptoms; do not wait until your next office visit.

## 2024-02-19 NOTE — OP NOTE
Procedure: EGD, Endoscopic Ultrasound (EUS) with Axios stent placement, Celiac Plexus Block    Indication: Patient has history of unresectable pancreatic cancer. Abdominal pain, previously placed stent.    Date of Procedure: 2/19/2024    Patient profile: Refer to patient note in chart for documentation of history and physical    Providers: Yang Vanegas MD    Referring MD: Dr. Atwood    PCP: Ash Watters MD    Medicines: Monitored anesthesia care    Complication: No immediate complications.     Estimated blood loss: Minimal    Procedure: After the risks including, but not limited to medication reaction, infection, bleeding, perforation, missed lesions, benefits and alternatives of the procedure were discussed with the patient and all questions were answered, informed consent was obtained. The gastroscope and the linear echoendoscope were passed under direct vision throughout the procedure, the patient's blood pressure pulse and oxygen saturation were monitored continuously. The scopes were introduced through the mouth and advanced to the second part of duodenum. The endoscopy was performed without difficulty. The patient tolerated the procedure well.       Findings:   The duodenoscope was introduced from the mouth into the esophagus to the stomach into the duodenum. However, the scope was unable to pass into second portion. Hence, the duodenoscope was exchanged for single channel therapeutic scope.     Following this, the single channel therapeutic scope was advanced to the duodenum. The scope was then able to pass into the second portion of the duodenum with significant resistance.   The previously placed plastic stent was noted. The plastic stent appears to be within the stricture, suggesting ampullary involvement with the stricture.   The 0.035 inch wire was introduced through the scope and passed through the stricture. Then, a 20 mm x 10 mm Axios LAMS was introduced over the wire and deployed in the standard

## 2024-05-08 ENCOUNTER — HOSPICE ADMISSION (OUTPATIENT)
Dept: HOSPICE | Facility: HOSPICE | Age: 59
End: 2024-05-08
Payer: COMMERCIAL

## 2024-05-09 ENCOUNTER — HOME CARE VISIT (OUTPATIENT)
Dept: HOSPICE | Facility: HOSPICE | Age: 59
End: 2024-05-09
Payer: COMMERCIAL

## 2024-05-09 ENCOUNTER — HOME CARE VISIT (OUTPATIENT)
Dept: SCHEDULING | Facility: HOME HEALTH | Age: 59
End: 2024-05-09
Payer: COMMERCIAL

## 2024-05-09 VITALS
WEIGHT: 121 LBS | BODY MASS INDEX: 16.39 KG/M2 | HEIGHT: 72 IN | DIASTOLIC BLOOD PRESSURE: 60 MMHG | SYSTOLIC BLOOD PRESSURE: 90 MMHG | RESPIRATION RATE: 12 BRPM | OXYGEN SATURATION: 95 % | TEMPERATURE: 98.4 F | HEART RATE: 96 BPM

## 2024-05-09 PROCEDURE — 2500000001 HSPC NON INJECTABLE MED

## 2024-05-09 PROCEDURE — G0299 HHS/HOSPICE OF RN EA 15 MIN: HCPCS

## 2024-05-09 PROCEDURE — 0651 HSPC ROUTINE HOME CARE

## 2024-05-09 ASSESSMENT — ENCOUNTER SYMPTOMS
CRAMPS: 1
ABDOMINAL PAIN: 1
NAUSEA: 1
CONSTIPATION: 1
DYSPNEA ACTIVITY LEVEL: AFTER AMBULATING 10 - 20 FT

## 2024-05-09 NOTE — HOSPICE
Gerardo Conteh is a 58 yr old man  - a/o x 4 who has been admitted to [facility] with a hospice dx  - metastatic adenocarcinoma of the pancreas.  Pt is DNR.  PPS 50%.  VS 98.4 – 96 apical reg – 12 – 90/60   O2 sat RA 95%  LS dim throughout.  Patient reports he went into hospital this past week due to intractable pain – reporting he received a bilateral thoracoscopy splanchnicectomy – 2 stab/surgical wounds noted to R upper and 2 wounds to L upper back – lota – no drainage noted.    Patient reported he has received a biliary stent and a stent at the exit of his stomach on previous hospitalization.  Patient reports constant abdominal pain – burning, stinging, cramping pain greater on L abdomen – running around to back – with pain greater on L back.  Patient reports baseline pain at 8/10.  Patient reports he is still able to eat – reporting nausea daily – denies vomiting. During sn visit, patient ate bowl of cereal without problem.  Patient admits to no bm since 5/1 – reporting he has been passing gas.  Abd sl firm, non tender when gently palpated, + BS x4 quad – hyper.  Patient reports he has been having ongoing issues with constipation – has multiple bowel meds in home but has not been taking on a regular basis.  Extensive teaching done re bowel management – patient now ordered for Senna S – 2 tabs bid – scheduled.  Patient also instructed to try to drink prune juice if able.  Patient’s wife verbalized good understanding – reporting she will  some juice after sn visit and have patient take in addition to Senna S.  Dulcolax suppository ordered – delivery due tomorrow.  Shirley – wife is very anxious about “medical things” and will need to be taught how to administer a suppository.  Shirley has been managing patient’s meds – keeping very accurate records on what is administered and when – but admits she is anxious about changing fentanyl patch, suppository adm.  Ongoing teaching, support required. Pain

## 2024-05-10 ENCOUNTER — HOME CARE VISIT (OUTPATIENT)
Dept: SCHEDULING | Facility: HOME HEALTH | Age: 59
End: 2024-05-10
Payer: COMMERCIAL

## 2024-05-10 VITALS
HEART RATE: 102 BPM | TEMPERATURE: 98.4 F | DIASTOLIC BLOOD PRESSURE: 74 MMHG | RESPIRATION RATE: 19 BRPM | SYSTOLIC BLOOD PRESSURE: 110 MMHG

## 2024-05-10 PROCEDURE — 2500000001 HSPC NON INJECTABLE MED

## 2024-05-10 PROCEDURE — G0299 HHS/HOSPICE OF RN EA 15 MIN: HCPCS

## 2024-05-10 PROCEDURE — 0651 HSPC ROUTINE HOME CARE

## 2024-05-10 ASSESSMENT — ENCOUNTER SYMPTOMS
ABDOMINAL PAIN: 1
CONSTIPATION: 1
CRAMPS: 1
FLATUS: 1

## 2024-05-10 NOTE — HOSPICE
Pt Tucker and wife Shirley present during visit. RN greeted at door by wife. Pt sitting on sofa in living room. Pt alert, awake, and talkative during visit. Pt c/o gas pain and passing gas, but no BM since May 1st. Pt c/o nausea when eating and poor appetite. Pt expressed frustration at phenergan being d/c'd on admission to hospice. Pt sts phenergan was the only medication the relieved his nausea. RN received verbal order from Dr. Jacobo to order phenergan. See medications. Multiple wounds noted to L and R posterior rib cage. See wound addendum. Dulcolax suppository ordered through [facility] yesterday. Pt and wife verbalize understanding of admin instructions. Phenergan called into [facility]. No supply needs. No other needs expressed at this time.   Full assessment completed. See ROS  Educated pt and wife on POC. Verbalized understanding  Reminded pt and wife 24/7 RN availability. Encouraged to call [facility] with any questions or concerns.

## 2024-05-10 NOTE — HOSPICE
Tucker is a  Manda[place] male born 1965 diagnosed with Adenocarcinoma of pancreas.  COVID19 Screen 24 - Negative.  Tucker has a DNR.  Family will use [facility] in [place].  O2 is in the home.  Tucker does not like it but is accepting of it for when he needs it.  Tucker was alert, verbal, mobile without assistance and was tearful periodically in visit.  Tucker express great pain and his pain level was observed by the .  Danielle CRENSHAW, arrived at the home as  was ending his visit.   spoke with the RNCM before she entered the home updating her on his pain level and visit.  Tucker spoke of his xiang, family, and work hx.  He shared his concerns for his family after he dies.  Shared a xiang experience and spoke of a friend who is a  in the area who had visited him in the hospital and who actually has gone hunting with him.  Tucker is very connected to God in and through nature.  While he values Latter day membership/attendance, his work life has not allowed him to attend without great changes in his work schedule.   shared information about O2 safety in the home, Signs of Decline, and alternate number for communication as they expressed understanding.  Shirley and their two daughters were present for visit.  Shirley and Tucker have been  for 34 yrs.  They have 2 daughters and 2 grandchildren.  Daughters live in the area and are helpful to Shirley assisting in care when they are able.  Tucker has 1 living sibling also in the area who is supportive.  In addition, Tucker's mother was living with them but now lives with his sister as caring for him and his mother was too much for Shirley.  They are of the Mandaen Religious with varying xiang traditions.  They have Latter day hopped but never agreed upon a family Latter day.  Shirley has [employer] roots and has joined churches in the past but not since being where they are now.  Tucker has never joined any Latter day and professes

## 2024-05-11 PROCEDURE — 0651 HSPC ROUTINE HOME CARE

## 2024-05-12 PROCEDURE — 0651 HSPC ROUTINE HOME CARE

## 2024-05-13 ENCOUNTER — HOME CARE VISIT (OUTPATIENT)
Dept: HOSPICE | Facility: HOSPICE | Age: 59
End: 2024-05-13
Payer: COMMERCIAL

## 2024-05-13 PROCEDURE — G0155 HHCP-SVS OF CSW,EA 15 MIN: HCPCS

## 2024-05-13 PROCEDURE — 0651 HSPC ROUTINE HOME CARE

## 2024-05-14 ENCOUNTER — HOME CARE VISIT (OUTPATIENT)
Dept: SCHEDULING | Facility: HOME HEALTH | Age: 59
End: 2024-05-14
Payer: COMMERCIAL

## 2024-05-14 VITALS
RESPIRATION RATE: 14 BRPM | SYSTOLIC BLOOD PRESSURE: 108 MMHG | HEART RATE: 87 BPM | DIASTOLIC BLOOD PRESSURE: 66 MMHG | TEMPERATURE: 98.7 F

## 2024-05-14 PROCEDURE — 0651 HSPC ROUTINE HOME CARE

## 2024-05-14 PROCEDURE — G0299 HHS/HOSPICE OF RN EA 15 MIN: HCPCS

## 2024-05-14 ASSESSMENT — ENCOUNTER SYMPTOMS
CRAMPS: 1
FLATUS: 1

## 2024-05-14 NOTE — HOSPICE
Pt Tucker and wife Shirley present during visit. RN greeted at door by wife. Pt sitting on sofa in living room on arrival. Pt c/o persistant nausea which is effecting pt's appetite. Pt only taking phenergan at night and using zofran during day to help relieve nausea. Pt is eating only bites at each meal. Pt reports frequent indigestion and sts pepcid is not working. RN encouraged pt to eat foods he enjoys and can tolerate. Pt and wife verbalized understanding.  Last BM on 5/12. Pt reports BM caused severe pain. Encouraged pt to take senna as prescribed to keep BMs regular. Pt sleeping longer stretches at night and throughout the day. Pt reports pain is slightly better controlled with increase in oxycodone, but sts pain is still 7/10. Pt tearful during visit. Pt sts \"I keep thinking if I get the pain under control that I'll be able to do things around the house, but the reality is that if I get the pain under control I'll just be able to relax.\" RN provided therapeutic listening. RN to speak with provider about possibility of starting pt on antidepressant. Incisions to back healing well. See wound addendum. No medication refills or supply needs at this time.   Full assessment completed. See ROS  Educated pt and family on POC. Verbalized understanding  Reminded pt and family of 24/7 RN availability. Encouraged to call [facility] with any questions or concerns.

## 2024-05-15 ENCOUNTER — HOME CARE VISIT (OUTPATIENT)
Dept: HOSPICE | Facility: HOSPICE | Age: 59
End: 2024-05-15
Payer: COMMERCIAL

## 2024-05-15 PROBLEM — R10.13 EPIGASTRIC PAIN: Status: ACTIVE | Noted: 2023-01-24

## 2024-05-15 PROBLEM — Z51.5 HOSPICE CARE: Status: ACTIVE | Noted: 2024-05-15

## 2024-05-15 PROBLEM — K86.1 CHRONIC BILIARY PANCREATITIS (HCC): Status: ACTIVE | Noted: 2023-10-24

## 2024-05-15 PROBLEM — K86.89 PANCREATIC NECROSIS: Status: ACTIVE | Noted: 2023-10-10

## 2024-05-15 PROCEDURE — 0651 HSPC ROUTINE HOME CARE

## 2024-05-15 PROCEDURE — 2500000001 HSPC NON INJECTABLE MED

## 2024-05-15 ASSESSMENT — ENCOUNTER SYMPTOMS: HEMOPTYSIS: 0

## 2024-05-15 NOTE — HOSPICE
Patient is 58 y.o.male ,with metastatic adenocarcinoma of the pancreas.Pt resides in his home with the assistance of spouse.Pt sitting on couch upon LMSW arrival.Pt is alert and oriented to person,place and time.Pt verbalized feeling much better regarding his pain.Pt was not short of breath during visit.Pt states he has worked for several years and now receivng short term disability benefits at this time.Pt has two daughters whom are very involved with his care.Pt remains independent but spouse is requesting a walker and bedside commode.Pt not affiliated with a Faith at this time.LMSW provided emotional support and active listening.Pt states his hopeful to remain at home for end of life.NO  arrangements have been made at this time.Advised of community resources and all of hospice services.Encouraged pt to listen to his body and contact [facility] with any needs or concerns.LMSW to continue to provide support for patient and family throughout patient's conitnued disease progression.

## 2024-05-16 ENCOUNTER — HOME CARE VISIT (OUTPATIENT)
Dept: SCHEDULING | Facility: HOME HEALTH | Age: 59
End: 2024-05-16
Payer: COMMERCIAL

## 2024-05-16 VITALS
DIASTOLIC BLOOD PRESSURE: 76 MMHG | TEMPERATURE: 99.6 F | SYSTOLIC BLOOD PRESSURE: 96 MMHG | HEART RATE: 79 BPM | RESPIRATION RATE: 14 BRPM

## 2024-05-16 PROCEDURE — G0299 HHS/HOSPICE OF RN EA 15 MIN: HCPCS

## 2024-05-16 PROCEDURE — 0651 HSPC ROUTINE HOME CARE

## 2024-05-16 NOTE — HOSPICE
Pt Tucker and wife Shirley present during visit. RN greeted at door by wife. Pt sitting up on sofa in living room. Pt reports having just awakened. Pt concerned by how much he has been sleeping today. RN educated pt on effects of pain medication and his body being able to relax after receiving pain relief. Pt's posture is relaxed. Pt able to sit back on sofa and elevate legs on ottoman. RN has not witnessed pt do this since admission to hospice d/t severe abdominal pain. Wife reports pt has slept the most he has slept since leaving the hospital. Pt and wife in agreement to continue pain medication regimen and will call with any concerns or if pt decides to be admitted to  for pain control. On assessment, pt's pain is controlled and pt is able to rest. Reminded pt and wife of 24/7 RN availability. Encouraged to call [facility] with any questions or concerns. Pt is agreeable and motivated to participate in PT treatment

## 2024-05-17 PROCEDURE — 2500000001 HSPC NON INJECTABLE MED

## 2024-05-17 PROCEDURE — 0651 HSPC ROUTINE HOME CARE

## 2024-05-18 PROCEDURE — 0651 HSPC ROUTINE HOME CARE

## 2024-05-19 PROCEDURE — 0651 HSPC ROUTINE HOME CARE

## 2024-05-20 ENCOUNTER — HOME CARE VISIT (OUTPATIENT)
Dept: HOSPICE | Facility: HOSPICE | Age: 59
End: 2024-05-20
Payer: COMMERCIAL

## 2024-05-20 PROCEDURE — 0651 HSPC ROUTINE HOME CARE

## 2024-05-21 ENCOUNTER — HOME CARE VISIT (OUTPATIENT)
Dept: SCHEDULING | Facility: HOME HEALTH | Age: 59
End: 2024-05-21
Payer: COMMERCIAL

## 2024-05-21 ENCOUNTER — HOME CARE VISIT (OUTPATIENT)
Dept: HOSPICE | Facility: HOSPICE | Age: 59
End: 2024-05-21
Payer: COMMERCIAL

## 2024-05-21 VITALS
HEART RATE: 88 BPM | TEMPERATURE: 99.2 F | RESPIRATION RATE: 14 BRPM | SYSTOLIC BLOOD PRESSURE: 90 MMHG | DIASTOLIC BLOOD PRESSURE: 62 MMHG

## 2024-05-21 PROCEDURE — G0299 HHS/HOSPICE OF RN EA 15 MIN: HCPCS

## 2024-05-21 PROCEDURE — 0651 HSPC ROUTINE HOME CARE

## 2024-05-21 PROCEDURE — 2500000001 HSPC NON INJECTABLE MED

## 2024-05-21 ASSESSMENT — ENCOUNTER SYMPTOMS
CRAMPS: 1
ABDOMINAL PAIN: 1

## 2024-05-21 NOTE — HOSPICE
Pt Tucker, wife Shirley, and Dr. Jacobo present during visit. RN greeted at door by wife. Pt sitting on back patio. Pt alert and talkative during visit. Pt reports pain 5/10 on assessment. Wife reports pt sleeping majority of the day. Wife has been giving oxycodone q4h for pain which is working well to control pain. Pt has been using walker to get around house. WIfe reports pt fell twice over the past weekend causing skin tear to R elbow. See addendum. Wife reports pt had swelling to bilateral ankles with the left swelling more than the right. Wife sts swelling improves when legs are elevated. D/c'd melatonin, remeron, and robaxin this visit. Kaitlin called into [facility]. No supply needs at this time.   Full assessment completed. See ROS  Educated pt and wife on POC. Verbalized understanding  Reminded pt and wife of 24/7 RN availability. Encouraged to call [facility] with any questions or concerns.

## 2024-05-22 ENCOUNTER — HOME CARE VISIT (OUTPATIENT)
Dept: HOSPICE | Facility: HOSPICE | Age: 59
End: 2024-05-22
Payer: COMMERCIAL

## 2024-05-22 PROCEDURE — 0651 HSPC ROUTINE HOME CARE

## 2024-05-23 PROCEDURE — 0651 HSPC ROUTINE HOME CARE

## 2024-05-24 PROCEDURE — 0651 HSPC ROUTINE HOME CARE

## 2024-05-25 ENCOUNTER — HOME CARE VISIT (OUTPATIENT)
Dept: SCHEDULING | Facility: HOME HEALTH | Age: 59
End: 2024-05-25
Payer: COMMERCIAL

## 2024-05-25 PROCEDURE — 0651 HSPC ROUTINE HOME CARE

## 2024-05-26 ENCOUNTER — HOME CARE VISIT (OUTPATIENT)
Dept: SCHEDULING | Facility: HOME HEALTH | Age: 59
End: 2024-05-26
Payer: COMMERCIAL

## 2024-05-26 PROCEDURE — 0651 HSPC ROUTINE HOME CARE

## 2024-05-27 PROCEDURE — 2500000001 HSPC NON INJECTABLE MED

## 2024-05-27 PROCEDURE — 0651 HSPC ROUTINE HOME CARE

## 2024-05-28 ENCOUNTER — HOME CARE VISIT (OUTPATIENT)
Dept: HOSPICE | Facility: HOSPICE | Age: 59
End: 2024-05-28
Payer: COMMERCIAL

## 2024-05-28 PROCEDURE — 0651 HSPC ROUTINE HOME CARE

## 2024-05-29 ENCOUNTER — HOME CARE VISIT (OUTPATIENT)
Dept: SCHEDULING | Facility: HOME HEALTH | Age: 59
End: 2024-05-29
Payer: COMMERCIAL

## 2024-05-29 VITALS
DIASTOLIC BLOOD PRESSURE: 64 MMHG | TEMPERATURE: 98.5 F | SYSTOLIC BLOOD PRESSURE: 88 MMHG | HEART RATE: 93 BPM | RESPIRATION RATE: 16 BRPM

## 2024-05-29 PROCEDURE — 0651 HSPC ROUTINE HOME CARE

## 2024-05-29 PROCEDURE — G0299 HHS/HOSPICE OF RN EA 15 MIN: HCPCS

## 2024-05-29 PROCEDURE — 2500000001 HSPC NON INJECTABLE MED

## 2024-05-29 ASSESSMENT — ENCOUNTER SYMPTOMS
CONSTIPATION: 1
ABDOMINAL PAIN: 1

## 2024-05-29 NOTE — HOSPICE
Pt Tucker and wife Shirley present during visit. RN greeted at door by wife. Pt sitting on sofa in living room. Pt sts \"this is the best I've felt since I got out of the hospital.\" Pt alert and talkative during visit. Pain is 2/10. Pt having BMs every 5-6 days. Last BM today after using dulcolax suppository. Pt reports appetite has been poor, eating approx 6-8 bites at each meal. Pt is sleeping approx 2 hrs at a time before pain increases. Wife is giving oxycodone q2h for pain. Pt has had diffiuculty swallowing. Wife has been giving pt pills with applesauce which has helped pt swallow without difficulty. Pitting edema +2 noted to BLE. Abdominal distention increased since last visit. Comfort delilah and refill of phenergan ordered via [facility]. No supply needs at this time.   Full assessment completed. See ROS  Educated pt and wife on POC. Verbalized understanding  Reminded pt and wife of 24/7 RN avvailability. Encouraged to call [facility] with any questions or concerns.

## 2024-05-30 PROCEDURE — 0651 HSPC ROUTINE HOME CARE

## 2024-05-31 PROCEDURE — 2500000001 HSPC NON INJECTABLE MED

## 2024-05-31 PROCEDURE — 0651 HSPC ROUTINE HOME CARE

## 2024-06-01 PROCEDURE — 0651 HSPC ROUTINE HOME CARE

## 2024-06-02 ENCOUNTER — HOME CARE VISIT (OUTPATIENT)
Dept: SCHEDULING | Facility: HOME HEALTH | Age: 59
End: 2024-06-02
Payer: COMMERCIAL

## 2024-06-02 VITALS — SYSTOLIC BLOOD PRESSURE: 94 MMHG | RESPIRATION RATE: 6 BRPM | DIASTOLIC BLOOD PRESSURE: 64 MMHG | HEART RATE: 88 BPM

## 2024-06-02 PROCEDURE — G0299 HHS/HOSPICE OF RN EA 15 MIN: HCPCS

## 2024-06-02 PROCEDURE — 0651 HSPC ROUTINE HOME CARE

## 2024-06-03 ENCOUNTER — HOME CARE VISIT (OUTPATIENT)
Dept: SCHEDULING | Facility: HOME HEALTH | Age: 59
End: 2024-06-03
Payer: COMMERCIAL

## 2024-06-03 VITALS
HEART RATE: 75 BPM | DIASTOLIC BLOOD PRESSURE: 70 MMHG | TEMPERATURE: 98.3 F | RESPIRATION RATE: 12 BRPM | SYSTOLIC BLOOD PRESSURE: 92 MMHG

## 2024-06-03 PROCEDURE — G0299 HHS/HOSPICE OF RN EA 15 MIN: HCPCS

## 2024-06-03 ASSESSMENT — ENCOUNTER SYMPTOMS: ABDOMINAL PAIN: 1

## 2024-06-03 NOTE — HOSPICE
Pt Tucker, daughter Mercy, and wife Shirley present during visit. RN greeted at door by wife. Pt sitting up on sofa in living room on arrival. Pt awake but drowsy during visit. Pt reports nausea, pain, and sob yesterday. Starr RN saw pt yesterday for same. Pt reports feeling better today. Pt sts \"I believe I was having panic attacks.\" RN encouraged pt and family to utilize lorazepam as needed for \"panic attacks\". Family verbalized understanding. Pt's appetite has decreased since last week. Wife sts pt will only take bites occassionally, but does not eat at every meal. Pt has had trouble swallowing recently. Wife has been placing pills in applesauce to help pt swallow without difficulty. Pt reports pain 6/10. Last BM on 5/31. Pt reports significant pain after having BMs. Pt sleep is interrupted d/t pain. 2+ pitting noted to BLE. RN received verbal orders from Adelaide RG to increase fentanyl to 250 mcg, order liquid oxycodone, and schedule famotidine twice daily. RN informed wife of change in medications. Wife successfully taught back and verbalized understanding. Medications called into [facility], No refill needs at this time. No other needs expressed.  Full assessment completed. See ROS  Educated pt and family on POC. Verbalized understanding  Reminded pt and family of 24/7 RN availability. Encouraged to call [facility] with any questions or concerns.

## 2024-06-04 ENCOUNTER — HOME CARE VISIT (OUTPATIENT)
Dept: HOSPICE | Facility: HOSPICE | Age: 59
End: 2024-06-04
Payer: COMMERCIAL

## 2024-06-04 PROCEDURE — G0155 HHCP-SVS OF CSW,EA 15 MIN: HCPCS

## 2024-06-05 ENCOUNTER — HOME CARE VISIT (OUTPATIENT)
Dept: HOSPICE | Facility: HOSPICE | Age: 59
End: 2024-06-05
Payer: COMMERCIAL

## 2024-06-05 ASSESSMENT — ENCOUNTER SYMPTOMS: HEMOPTYSIS: 0

## 2024-06-05 NOTE — HOSPICE
Routine visit made to assess needs and offer emotional support.Met with pt and spouse to discuss any needs or concerns.Pt has declined since last SW visit.Pt complaining of discomfort.Spouse states nurse and medical director are adjusting medications.Pt asleep during visit,but did awake briefly.Spouse is sadden regarding pt's decline.Spouse states they have discussed  arrangements and plans include cremation.Spouse states pt continues to sleep on the couch to have relief.Spouse provides excellent care and is very attentaive to pt's care.Spouse is requesting assistance with completing disability paperwork.SW will forward to medical director for completion.Praised spouse for providing good care and encouraged to contact hospice as needed.All voiced understanding.

## 2024-06-07 ENCOUNTER — HOME CARE VISIT (OUTPATIENT)
Dept: SCHEDULING | Facility: HOME HEALTH | Age: 59
End: 2024-06-07
Payer: COMMERCIAL

## 2024-06-07 VITALS
SYSTOLIC BLOOD PRESSURE: 92 MMHG | HEART RATE: 92 BPM | TEMPERATURE: 98.5 F | DIASTOLIC BLOOD PRESSURE: 58 MMHG | RESPIRATION RATE: 15 BRPM

## 2024-06-07 PROCEDURE — G0299 HHS/HOSPICE OF RN EA 15 MIN: HCPCS

## 2024-06-07 ASSESSMENT — ENCOUNTER SYMPTOMS
BOWEL INCONTINENCE: 1
ABDOMINAL PAIN: 1

## 2024-06-07 NOTE — HOSPICE
PRN visit conducted. Pt Tucker, wife Shirley, and daughters Brandie and Mercy present during visit. RN greeted at door by wife. Pt sitting on sofa, awake, and alert on arrival. Pt reports rectal bleeding has continued since yesterday. Pt wearing maxi pads to protect clothing. Pt reports bright red blood. RN educated pt and family on disease progression. Pt and family verbalized understanding. Wife reports liquid oxycodone has been working well for pain control. Pt continues having breakthrough pain before fentanyl patches due to be changed. Haldol and protonix refills called into [facility]. No other needs expressed at this time. Reminded pt and family of 24/7 RN availability. Encouraged to call [facility] with any questions or concerns.

## 2024-06-07 NOTE — HOSPICE
DEN Santos and Tucker' wife, Shirley were on the front porch talking when  arrived.  Shirley and  went into the home where their two daughters were sitting with Tucker and Tucker was sitting on the sofa, covered with a light throw, quiet.  Pt appeared in pain and was alert and verbal.  He presented as tired and when asked stated that when he has pain he gets quiet and tries to thing of other things.  Shirley shared what they were doing for the pain and how they are working with Danielle to see how this new routine will help him with his break-through pain level.  Tucker spoke of his family and was open to scripture reading and prayer.  Adult children and wife are very supportive of Tucker.  When  was leaving he spoke with Shirley on the front porch.  She was tearful and shared that this was so hard.   acknowledged her pain and provided a hug for her.  When hugging her she began to cry.   provided spiritual care through pastoral conversation, active listening, supportive responses, scripture reading (Johan 8:22 - 28) and prayer.  Next regular scheduled visit is set for Friday, July 19 btw 12/1.

## 2024-06-11 ENCOUNTER — HOME CARE VISIT (OUTPATIENT)
Dept: SCHEDULING | Facility: HOME HEALTH | Age: 59
End: 2024-06-11
Payer: COMMERCIAL

## 2024-06-11 VITALS
TEMPERATURE: 98.2 F | HEART RATE: 94 BPM | SYSTOLIC BLOOD PRESSURE: 108 MMHG | RESPIRATION RATE: 14 BRPM | DIASTOLIC BLOOD PRESSURE: 74 MMHG

## 2024-06-11 PROCEDURE — G0299 HHS/HOSPICE OF RN EA 15 MIN: HCPCS

## 2024-06-11 ASSESSMENT — ENCOUNTER SYMPTOMS
STOOL DESCRIPTION: BLOODY
ABDOMINAL PAIN: 1

## 2024-06-11 NOTE — HOSPICE
Pt Tucker, wife Shirley, and daughter Mercy present during visit. RN greeted at door by wife. Pt sitting up on sofa. Pt reports falling this AM 0200 after losing his balance. Fall was witnessed by daughter who attempted to prevent pt from falling. Pt reports pain to coccyx at the time, but denies pain to that area at this time. Pt reports sleeping less than last visit. Pt reports sleeping 2 hours at a time and then being awake for several hours. Pt reports having double vision which is new to pt. Pt tearful during visit. Pt taking oxycodone every 2-4 hours. Pt continues having increased anxiety and sob. Wife gives pt lorazepam which hasn't been helping reduce anxiety. RN to consult with provider. Pt is eating only bites at meals, but continues drinking adequate amount of fluids. Last BM 2 days ago. Pt reports rectal bleeding has continued since last week. Pt wearing pull ups to protect clothes. Oxycodone, lorazepam, fentanyl called into [facility]. Pull ups and wipes ordered via [facility]. No other needs expressed at this time.  Full assessment completed. See ROS  Educated pt and family on POC. Verbalized understanding  Reminded pt and family of 24/7 RN availability. Encouraged to call [facility] with any questions or concerns.

## 2024-06-12 ENCOUNTER — HOME CARE VISIT (OUTPATIENT)
Dept: HOSPICE | Facility: HOSPICE | Age: 59
End: 2024-06-12
Payer: COMMERCIAL

## 2024-06-12 VITALS
TEMPERATURE: 98.4 F | OXYGEN SATURATION: 94 % | SYSTOLIC BLOOD PRESSURE: 108 MMHG | HEART RATE: 98 BPM | DIASTOLIC BLOOD PRESSURE: 58 MMHG | RESPIRATION RATE: 17 BRPM

## 2024-06-12 PROBLEM — H90.12 CONDUCTIVE HEARING LOSS OF LEFT EAR WITH UNRESTRICTED HEARING OF RIGHT EAR: Status: ACTIVE | Noted: 2021-01-28

## 2024-06-12 PROBLEM — F17.210 CIGARETTE NICOTINE DEPENDENCE WITHOUT COMPLICATION: Status: ACTIVE | Noted: 2018-07-03

## 2024-06-12 PROBLEM — G47.30 SLEEP APNEA: Status: ACTIVE | Noted: 2023-07-13

## 2024-06-12 PROBLEM — H69.92 DYSFUNCTION OF LEFT EUSTACHIAN TUBE: Status: ACTIVE | Noted: 2021-01-28

## 2024-06-12 PROBLEM — H65.492 CHRONIC OTITIS MEDIA OF LEFT EAR WITH EFFUSION: Status: ACTIVE | Noted: 2021-01-28

## 2024-06-12 PROCEDURE — G0299 HHS/HOSPICE OF RN EA 15 MIN: HCPCS

## 2024-06-12 PROCEDURE — G0155 HHCP-SVS OF CSW,EA 15 MIN: HCPCS

## 2024-06-12 ASSESSMENT — ENCOUNTER SYMPTOMS
RECTAL BLEEDING: 1
COUGH: 1
ABDOMINAL PAIN: 1
PAIN LOCATION - PAIN QUALITY: UNABLE TO DESCRIBE
SPUTUM CONSISTENCY: THICK
SPUTUM PRODUCTION: 1
INDIGESTION: 1
SPUTUM AMOUNT: MODERATE
COUGH CHARACTERISTICS: PRODUCTIVE
BLOOD IN STOOL: 1
VOMITING: 1
NAUSEA: 1

## 2024-06-12 NOTE — HOSPICE
PRN visit made.  recvd a call from Boris's wife Bozena stating that boris was having uncontrolled nausea/Vomiting, indigestion and abdominal pain. RN arrived to home Boris was seen sitting upright on the couch wife Bozena and daughter Mercy present. Boris has been receiving all PRN and scheduled medications as ordered with little to no relief for nausea/vomiting and pain. Current pain level 8/10 after oxycodone was given. Per family Boris has slept only about 20-30 minutes in the past 48 hours. Boris shared that he is afraid to fall asleep. Rhonchi noted in the lungs bilaterally-Boris has had increased brown-tinged sputum. Abdomen distended and tender when palpating. Wife bozena stated he continues to have blood in his stool. Boris is alert and oriented but looks to be weak and slow to answer questions. Skin is dusky. Vitals per chart. RN notified Adelaide, FELICITAS and recvd medication changes per MAR and spoke with Dr. Jacobo and kathy orders okay to bring into hospice house for GIP. When RN went back inside spoke with Boris and family who are agreeable and just want him to have relief. Notified [facility] who set up transport  for 1700 today. RN called report to Tatianna in the hospice house.     While waiting for transport Boris vomited again a large amount of brown/red tinged liquid. PRN haldol given

## 2024-06-14 ASSESSMENT — ENCOUNTER SYMPTOMS: HEMOPTYSIS: 0

## 2024-06-14 NOTE — HOSPICE
Routine visit made to assess needs and offer emotional support.Pt approved for [facility] level at [facility].Pt having uncontolled pain at this time.Spouse states pt has been vomiting and nausated.Pt is in agreement with going to [facility].LMSW explained criteria for [facility] and sined appropriate paperwork.Plans are to return home at pt's request.[facility] called to schedule transport.Pt will be picked up at 5pm.Advised pt and spouse of items needed for the stay at [facility].All voiced understanding.Encouraged all to vent feelings and offered emotional support.LMSW will continue to coordinate care with [facility] facility staff to meet pt/family needs.

## (undated) DEVICE — GLOVE SURG SZ 7 L12IN FNGR THK79MIL GRN LTX FREE

## (undated) DEVICE — KENDALL RADIOLUCENT FOAM MONITORING ELECTRODE RECTANGULAR SHAPE: Brand: KENDALL

## (undated) DEVICE — PAD,NON-ADHERENT,3X8,STERILE,LF,1/PK: Brand: MEDLINE

## (undated) DEVICE — GLOVE SURG SZ 65 CRM LTX FREE POLYISOPRENE POLYMER BEAD ANTI

## (undated) DEVICE — ENDOSCOPIC ULTRASOUND ASPIRATION NEEDLE: Brand: EXPECT SLIMLINE SL

## (undated) DEVICE — BLOCK BITE AD 60FR W/ VELC STRP ADDRESSES MOST PT AND

## (undated) DEVICE — TROCAR: Brand: KII FIOS FIRST ENTRY

## (undated) DEVICE — SUTURE SZ 0 27IN 5/8 CIR UR-6  TAPER PT VIOLET ABSRB VICRYL J603H

## (undated) DEVICE — ELECTRODE PT RET AD L9FT HI MOIST COND ADH HYDRGEL CORDED

## (undated) DEVICE — MOUTHPIECE ENDOSCP L CTRL OPN AND SIDE PORTS DISP

## (undated) DEVICE — GENERAL LAPAROSCOPY: Brand: MEDLINE INDUSTRIES, INC.

## (undated) DEVICE — ESOPHAGEAL BALLOON DILATATION CATHETER: Brand: CRE FIXED WIRE

## (undated) DEVICE — TROCARS: Brand: KII® BLUNT TIP ACCESS SYSTEM

## (undated) DEVICE — CANNULA NSL ORAL AD FOR CAPNOFLEX CO2 O2 AIRLFE

## (undated) DEVICE — Device

## (undated) DEVICE — BAG SPEC REM 224ML W4XL6IN DIA10MM 1 HND GYN DISP ENDOPCH

## (undated) DEVICE — GARMENT,MEDLINE,DVT,INT,CALF,MED, GEN2: Brand: MEDLINE

## (undated) DEVICE — 1200 GUARD II KIT W/5MM TUBE W/O VAC TUBE: Brand: GUARDIAN

## (undated) DEVICE — LAPAROSCOPIC TROCAR SLEEVE/SINGLE USE: Brand: KII® OPTICAL ACCESS SYSTEM

## (undated) DEVICE — TROCAR: Brand: KII® SLEEVE

## (undated) DEVICE — YANKAUER,BULB TIP,W/O VENT,RIGID,STERILE: Brand: MEDLINE

## (undated) DEVICE — SINGLE PORT MANIFOLD: Brand: NEPTUNE 2

## (undated) DEVICE — LOGICUT SCISSOR LENGTH 320MM: Brand: LOGI - LAPAROSCOPIC INSTRUMENT SYSTEM

## (undated) DEVICE — LUBE JELLY FOIL PACK 1.4 OZ: Brand: MEDLINE INDUSTRIES, INC.

## (undated) DEVICE — SYRINGE MED 10ML LUERLOCK TIP W/O SFTY DISP

## (undated) DEVICE — GLOVE SURG SZ 65 THK91MIL LTX FREE SYN POLYISOPRENE

## (undated) DEVICE — SYRINGE, LUER SLIP, STERILE, 60ML: Brand: MEDLINE

## (undated) DEVICE — GUIDEWIRE LOCKING DEVICE BIOPSY CAP

## (undated) DEVICE — INTENDED FOR TISSUE SEPARATION, AND OTHER PROCEDURES THAT REQUIRE A SHARP SURGICAL BLADE TO PUNCTURE OR CUT.: Brand: BARD-PARKER ® STAINLESS STEEL BLADES

## (undated) DEVICE — CONNECTOR TBNG OD5-7MM O2 END DISP

## (undated) DEVICE — SPHINCTEROTOME: Brand: DREAMTOME™ RX 44

## (undated) DEVICE — SYRINGE MED 3ML CLR PLAS STD N CTRL LUERLOCK TIP DISP

## (undated) DEVICE — AIRLIFE™ OXYGEN TUBING 7 FEET (2.1 M) CRUSH RESISTANT OXYGEN TUBING, VINYL TIPPED: Brand: AIRLIFE™

## (undated) DEVICE — NEEDLE SYR 18GA L1.5IN RED PLAS HUB S STL BLNT FILL W/O

## (undated) DEVICE — TUBING INSUFFLATION SMK EVAC HI FLO SET PNEUMOCLEAR

## (undated) DEVICE — 3M™ TEGADERM™ TRANSPARENT FILM DRESSING FRAME STYLE, 1624W, 2-3/8 IN X 2-3/4 IN (6 CM X 7 CM), 100/CT 4CT/CASE: Brand: 3M™ TEGADERM™

## (undated) DEVICE — 3M™ TEGADERM™ TRANSPARENT FILM DRESSING FRAME STYLE, 1626W, 4 IN X 4-3/4 IN (10 CM X 12 CM), 50/CT 4CT/CASE: Brand: 3M™ TEGADERM™

## (undated) DEVICE — RETRIEVAL BALLOON CATHETER: Brand: EXTRACTOR™ PRO RX

## (undated) DEVICE — ENDOSCOPIC KIT 1.1+ OP4 CA DE 2 GWN AAMI LEVEL 3

## (undated) DEVICE — GAUZE,SPONGE,4"X4",12PLY,WOVEN,NS,LF: Brand: MEDLINE